# Patient Record
Sex: FEMALE | Race: WHITE | NOT HISPANIC OR LATINO | Employment: OTHER | URBAN - METROPOLITAN AREA
[De-identification: names, ages, dates, MRNs, and addresses within clinical notes are randomized per-mention and may not be internally consistent; named-entity substitution may affect disease eponyms.]

---

## 2017-01-02 PROBLEM — I10 HYPERTENSION GOAL BP (BLOOD PRESSURE) < 150/90: Status: ACTIVE | Noted: 2017-01-02

## 2017-01-06 ENCOUNTER — RADIANT APPOINTMENT (OUTPATIENT)
Dept: CARDIOLOGY | Facility: CLINIC | Age: 82
End: 2017-01-06
Attending: INTERNAL MEDICINE
Payer: COMMERCIAL

## 2017-01-06 ENCOUNTER — RADIANT APPOINTMENT (OUTPATIENT)
Dept: BONE DENSITY | Facility: CLINIC | Age: 82
End: 2017-01-06
Attending: INTERNAL MEDICINE
Payer: COMMERCIAL

## 2017-01-06 DIAGNOSIS — R01.1 HEART MURMUR: ICD-10-CM

## 2017-01-06 DIAGNOSIS — Z78.0 ASYMPTOMATIC POSTMENOPAUSAL STATUS: ICD-10-CM

## 2017-01-06 PROCEDURE — 77080 DXA BONE DENSITY AXIAL: CPT | Performed by: INTERNAL MEDICINE

## 2017-01-06 PROCEDURE — 93306 TTE W/DOPPLER COMPLETE: CPT | Performed by: INTERNAL MEDICINE

## 2017-01-06 NOTE — Clinical Note
"[address]  [address]  [phone]                                   2017    Ingrid Culver  [address] [place] [phone]        Dear Ingrid,    Your bone density shows borderline osteoporosis. This finding along with other risk factors (age, weight, family history) implies that medical treatment IS indicated.      A bone binder (\"bisphosphonate\") to strengthen bones and reduce fracture risk such as Fosamax is recommended.  This would be a prescription and in addition to daily vitamin D (2000 IU total), exercise, calcium.     If you would like to discuss this added therapy, please make an appointment.     Results for orders placed or performed in visit on 17   DEXA HIP/PELVIS/SPINE - Future    Narrative    BONE DENSITOMETRY  [address] [address]  2017     PATIENT: Ingrid Culver  CHART: [id number]    :  1932  AGE:  84 year old  SEX:  female   REFERRING PROVIDER:  Radha Sheets MD    PROCEDURE:  Bone density scanning was performed using DXA technology of   the lumbar spine and hip.  Scanning was performed on a Lunar Prodigy   scanner.  Reporting is completed in the form of a T-score.  The T-score   represents the standard deviation from peak bone mass based on a young   healthy adult.    REFERENCE T-SCORES:       Normal                -1.0 and greater                                  Osteopenia         Between -1.0 and   -2.5                                            Osteoporosis     -2.5 and less                                         RISK FACTORS:  Post-menopausal    CURRENT TREATMENT:  Vitamin D    FINDINGS:               Lumbar Spine (L1-L4)      T-score:  -1.0, degenerative   changes present               Left Femoral Neck            T-score:  -2.1               Right Femoral Neck         T-score:  -2.3                            Lumbar " "(L1-L4) BMD: 1.062                     Total Hip Mean BMD: 0.752    IMPRESSION  Osteopenia., Degenerative changes of the lumbar spine which may falsely   elevate results.    Patient had a study performed previously, however the scans are not   available to compare to the current study.     Recommendations include ensuring adequate Calcium and Vitamin D.    The current NOF Guidelines recommend treatment for patients with prior hip   or vertebral fracture, T-score -2.5 or below, or 10 year risk of any major   osteoporotic fracture >20% or 10 year risk of hip fracture >3%, as   calculated using the FRAX calculator (www.shef.ac.uk/FRAX or you can   google \"FRAX\").      This patient's risks based on available information, with the use of FRAX,   are 17 % for major osteoporotic fracture and 5.9 % for hip fracture.   Based on these guidelines, treatment (in addition to calcium and vitamin   D) is recommended for this patient, after ruling out other causes of   osteoporosis.  This is meant as an aid to clinical decision making; one must still use   clinical judgement.      Follow up can be considered in 2 years.   ___________________  Christin Keenan M.D.  Electronically signed            If you have any questions please call the clinic at [phone]    Sincerely,    JOSE ANGEL MUSA M.D.  bmd    "

## 2017-01-06 NOTE — Clinical Note
"[facility]   [address] [place]  [address]  [phone]                                   2017    Ryan Culver  [address] [place] [phone]        Dear Ryan,    Your echocardiogram report is enclosed.  There is no source of the murmur I heard, so it was likely an \"innocent flow murmur\".     There is some \"thickening\" of the heart muscle on one area of the heart.  This is not likely to be compromising function.  The thickening CAN be a sign of uncontrolled blood pressure.  Over time, if the heart muscle becomes too thick, one can become more prone to heart failure symptoms.     At our recent appointment, we did discuss your blood pressure.  You had felt it was normal when you checked it at home    I would like you to check the blood pressure daily.  The goal is < 140/90.  If your numbers are often above this, please make an appointment to work on getting this better controlled.     Results for orders placed or performed in visit on 17   Echocardiogram Complete    Narrative    Interpretation Summary                    [facility], Echocardiography Laboratory  [address] [place] [address]  [place] [address]        Name: RYAN CULVER  MRN: [id number]  : 1932  Study Date: 2017 10:02 AM  Age: 84 yrs  Gender: Female  Patient Location: [facility]  Reason For Study: Cardiac murmur, unspecified  Ordering Physician: JOSE ANGEL MUSA  Referring Physician: JOSE ANGEL MUSA  Performed By: Majo Jeter RDCS     BSA: 1.5 m2  Height: 60 in  Weight: 118 lb  HR: 86  BP: 158/80 mmHg  ______________________________________________________________________________        Procedure  Echocardiogram with two-dimensional, color and spectral Doppler performed.  ______________________________________________________________________________     Interpretation Summary     Trace to mild mitral insufficiency is " present.  ______________________________________________________________________________           Left Ventricle  Global and regional left ventricular function is normal with an EF of 60-65%.  Left ventricular wall thickness is normal. Left ventricular size is normal.  Thickening of the anterobasal septum is present. Left ventricular diastolic  function is indeterminate. No regional wall motion abnormalities are seen.     Right Ventricle  Right ventricular function, chamber size, wall motion, and thickness are  normal.  Atria  Both atria appear normal.     Mitral Valve  Mild mitral annular calcification is present. Trace to mild mitral  insufficiency is present.     Aortic Valve  The aortic valve is tricuspid. Mild aortic valve sclerosis is present.     Tricuspid Valve  The tricuspid valve is normal. Trace tricuspid insufficiency is present. The  right ventricular systolic pressure is approximated at 25.5 mmHg plus the  right atrial pressure.     Pulmonic Valve  The pulmonic valve is normal. Trace pulmonic insufficiency is present.     Vessels  The aorta root is normal. The pulmonary artery is normal. The inferior vena  cava is normal.  Pericardium  No pericardial effusion is present.     ______________________________________________________________________________  MMode/2D Measurements & Calculations  IVSd: 1.3 cm  LVIDd: 3.7 cm  LVIDs: 2.2 cm  LVPWd: 0.64 cm  FS: 41.6 %  EDV(Teich): 59.6 ml  ESV(Teich): 15.9 ml  LV mass(C)d: 106.1 grams  Ao root diam: 3.3 cm  LA dimension: 3.3 cm  asc Aorta Diam: 2.9 cm  LA/Ao: 1.0  LVOT diam: 2.0 cm  LVOT area: 3.1 cm2  LA Volume (BP): 39.0 ml     LA Volume Index (BP): 26.2 ml/m2           Doppler Measurements & Calculations  MV E max nisha: 86.0 cm/sec  MV A max nisha: 137.5 cm/sec  MV E/A: 0.63  MV dec time: 0.22 sec  Ao V2 max: 203.0 cm/sec  Ao max P.5 mmHg  Ao V2 mean: 132.0 cm/sec  Ao mean P.0 mmHg  Ao V2 VTI: 40.1 cm  LOUIE(I,D): 1.7 cm2  LOUIE(V,D): 1.6 cm2  LV V1 max  P.3 mmHg  LV V1 max: 104.0 cm/sec  LV V1 VTI: 22.2 cm  CO(LVOT): 4.4 l/min  CI(LVOT): 2.9 l/min/m2  SV(LVOT): 69.7 ml  PA V2 max: 71.7 cm/sec  PA max P.1 mmHg  PA acc time: 0.10 sec  PI end-d sarath: 91.7 cm/sec  PI max sarath: 147.2 cm/sec  PI max P.7 mmHg  TR max sarath: 252.5 cm/sec  TR max P.5 mmHg  Pulm Sys Sarath: 63.2 cm/sec  Pulm Giron Sarath: 33.3 cm/sec  Pulm S/D: 1.9  LOUIE Index (cm2/m2): 1.2  Lateral E/e': 23.4  Medial E/e': 22.6           ______________________________________________________________________________        Report approved by: Rama Dinh 2017 10:49 AM          If you have any questions please call the clinic at [phone]    Sincerely,    JOSE ANGEL MUSA M.D.  bmd

## 2017-01-08 ENCOUNTER — TELEPHONE (OUTPATIENT)
Dept: INTERNAL MEDICINE | Facility: CLINIC | Age: 82
End: 2017-01-08

## 2017-01-08 NOTE — PROGRESS NOTES
"Quick Note:    Ingrid Culver    Your bone density shows borderline osteoporosis. This finding along with other risk factors (age, weight, family history) implies that medical treatment IS indicated.     A bone binder (\"bisphosphonate\") to strengthen bones and reduce fracture risk such as Fosamax is recommended. This would be a prescription and in addition to daily vitamin D (2000 IU total), exercise, calcium.     If you would like to discuss this added therapy, please make an appointment.     Sincerely,     JOSE ANGEL MUSA M.D.  ______  "

## 2017-01-08 NOTE — PROGRESS NOTES
"Quick Note:    Ingrid Culver    Your echocardiogram report is enclosed. There is no source of the murmur I heard, so it was likely an \"innocent flow murmur\".     There is some \"thickening\" of the heart muscle on one area of the heart. This is not likely to be compromising function. The thickening CAN be a sign of uncontrolled blood pressure. Over time, if the heart muscle becomes too thick, one can become more prone to heart failure symptoms.     At our recent appointment, we did discuss your blood pressure. You had felt it was normal when you checked it at home    I would like you to check the blood pressure daily. The goal is < 140/90. If your numbers are often above this, please make an appointment to work on getting this better controlled.     Sincerely,     JOSE ANGEL MUSA M.D.  ______  "

## 2017-01-08 NOTE — TELEPHONE ENCOUNTER
"Letter sent to patient:   ______________    Ingrid Culver    Your echocardiogram report is enclosed.  There is no source of the murmur I heard, so it was likely an \"innocent flow murmur\".     There is some \"thickening\" of the heart muscle on one area of the heart.  This is not likely to be compromising function.  The thickening CAN be a sign of uncontrolled blood pressure.  Over time, if the heart muscle becomes too thick, one can become more prone to heart failure symptoms.     At our recent appointment, we did discuss your blood pressure.  You had felt it was normal when you checked it at home    I would like you to check the blood pressure daily.  The goal is < 140/90.  If your numbers are often above this, please make an appointment to work on getting this better controlled.     Sincerely,     JOSE ANGEL MUSA M.D.   "

## 2017-01-12 ENCOUNTER — TELEPHONE (OUTPATIENT)
Dept: INTERNAL MEDICINE | Facility: CLINIC | Age: 82
End: 2017-01-12

## 2017-01-12 NOTE — TELEPHONE ENCOUNTER
I called patient and advised her of results; she states her BP is usually under 140/90; usually is 1298-130 over 72-80.    She does check it most days, advised her to keep an eye on it and call to be seen if it seems to be over 140/90 more often.    She states she used to be on fosamax but it made her so constipated, she is eating prunes and all bran, uses miralax and metamucil and has history of being hospitalized for blockage/constipation.  She is on 1000 units Vit D daily, not on calcium supplement but states she drinks a lot of milk and eats leafy greens.    Routed to [facility] to advise on plan with update from patient.  Olga Wooten RN  [facility]

## 2017-01-12 NOTE — TELEPHONE ENCOUNTER
Patient says she got her letters in the mail today.  She will monitor her BP and if having problems with it being over goal too much she will schedule to be seen for that and to discuss the osteoporosis treatment.  If BP okay, will consider coming in to discuss reclast, she is worried about side effects of that.  Olga Wooten RN  [facility]

## 2017-01-12 NOTE — TELEPHONE ENCOUNTER
We could order this as and IV infusion once yearly (Reclast).  That would bypass GI tract and she would still get treatment.  Would do at [facility] .  Would she be interested in getting scheduled for this infusion?

## 2017-01-12 NOTE — TELEPHONE ENCOUNTER
"See copy of result letter mailed to patient regarding Dexa:    Notes Recorded by Radha Sheets MD on 1/8/2017 at 3:03 PM  Ingrid Culver    Your bone density shows borderline osteoporosis. This finding along with other risk factors (age, weight, family history) implies that medical treatment IS indicated.      A bone binder (\"bisphosphonate\") to strengthen bones and reduce fracture risk such as Fosamax is recommended.  This would be a prescription and in addition to daily vitamin D (2000 IU total), exercise, calcium.     If you would like to discuss this added therapy, please make an appointment.     Sincerely,     RADHA SHEETS M.D.        See ECHO result letter mailed:    Echocardiogram Complete   Status: Final result     Visible to patient:  Not Released     Next appt: None     Dx:  Heart murmur               Notes Recorded by Juliann Jeong on 1/9/2017 at 8:17 AM  Lab letter sent ROMA Stephens  Notes Recorded by Radha Sheets MD on 1/8/2017 at 3:37 PM  Ingrid Culver    Your echocardiogram report is enclosed.  There is no source of the murmur I heard, so it was likely an \"innocent flow murmur\".     There is some \"thickening\" of the heart muscle on one area of the heart.  This is not likely to be compromising function.  The thickening CAN be a sign of uncontrolled blood pressure.  Over time, if the heart muscle becomes too thick, one can become more prone to heart failure symptoms.     At our recent appointment, we did discuss your blood pressure.  You had felt it was normal when you checked it at home    I would like you to check the blood pressure daily.  The goal is < 140/90.  If your numbers are often above this, please make an appointment to work on getting this better controlled.     Sincerely,     RADHA SHEETS M.D.     Details                    I called patient and advised her of results; she states her BP is usually under 140/90; usually is 1298-130 over 72-80.    She " does check it most days, advised her to keep an eye on it and call to be seen if it seems to be over 140/90 more often.    She states she used to be on fosamax but it made her so constipated, she is eating prunes and all bran, uses miralax and metamucil and has history of being hospitalized for blockage/constipation.  She is on 1000 units Vit D daily, not on calcium supplement but states she drinks a lot of milk and eats leafy greens.    Routed to [facility] to advise on plan with update from patient.  Olga Wooten RN  [facility]

## 2017-01-12 NOTE — TELEPHONE ENCOUNTER
Reason for Call:  Request for results:    Name of test or procedure: Bone Density & ECHO     Date of test of procedure: 01/06/17    Location of the test or procedure: [facility] to leave the result message on voice mail or with a family member? Not Applicable    Phone number Patient can be reached at:  Home number on file [phone] (home)    Additional comments: Call taken on 1/12/2017 at 1:16 PM by Maribell Hernandez

## 2017-01-16 DIAGNOSIS — E03.4 HYPOTHYROIDISM DUE TO ACQUIRED ATROPHY OF THYROID: Primary | ICD-10-CM

## 2017-01-16 NOTE — TELEPHONE ENCOUNTER
levothyroxine (SYNTHROID) 25 MCG tablet     Last Written Prescription Date: 12/29/15  Last Quantity: 90, # refills: 3  Last Office Visit with G, [facility] or [facility] prescribing provider: 12/19/16        TSH   Date Value Ref Range Status   12/19/2016 2.26 0.40 - 4.00 mU/L Final

## 2017-01-17 PROBLEM — E03.4 HYPOTHYROIDISM DUE TO ACQUIRED ATROPHY OF THYROID: Status: ACTIVE | Noted: 2017-01-17

## 2017-01-17 RX ORDER — LEVOTHYROXINE SODIUM 25 UG/1
25 TABLET ORAL DAILY
Qty: 90 TABLET | Refills: 3 | Status: SHIPPED | OUTPATIENT
Start: 2017-01-17 | End: 2018-01-01

## 2017-01-17 NOTE — TELEPHONE ENCOUNTER
Routing refill request to provider for review/approval because:  Associated diagnosis not on problem list  Olga Wooten RN  [facility]

## 2017-02-02 ENCOUNTER — MEDICAL CORRESPONDENCE (OUTPATIENT)
Dept: HEALTH INFORMATION MANAGEMENT | Facility: CLINIC | Age: 82
End: 2017-02-02

## 2017-02-10 ENCOUNTER — OFFICE VISIT (OUTPATIENT)
Dept: INTERNAL MEDICINE | Facility: CLINIC | Age: 82
End: 2017-02-10
Payer: COMMERCIAL

## 2017-02-10 VITALS
DIASTOLIC BLOOD PRESSURE: 70 MMHG | SYSTOLIC BLOOD PRESSURE: 125 MMHG | TEMPERATURE: 97.9 F | HEIGHT: 60 IN | WEIGHT: 122 LBS | BODY MASS INDEX: 23.95 KG/M2 | OXYGEN SATURATION: 94 % | HEART RATE: 78 BPM

## 2017-02-10 DIAGNOSIS — I10 HYPERTENSION GOAL BP (BLOOD PRESSURE) < 150/90: Primary | ICD-10-CM

## 2017-02-10 DIAGNOSIS — E03.4 HYPOTHYROIDISM DUE TO ACQUIRED ATROPHY OF THYROID: ICD-10-CM

## 2017-02-10 DIAGNOSIS — E78.5 HYPERLIPIDEMIA LDL GOAL <130: ICD-10-CM

## 2017-02-10 PROCEDURE — 99213 OFFICE O/P EST LOW 20 MIN: CPT | Performed by: INTERNAL MEDICINE

## 2017-02-10 RX ORDER — FAMOTIDINE 20 MG
TABLET ORAL 2 TIMES DAILY
COMMUNITY
End: 2021-12-08

## 2017-02-10 RX ORDER — MULTIVITAMIN WITH IRON
1 TABLET ORAL DAILY
COMMUNITY
End: 2022-05-13

## 2017-02-10 NOTE — MR AVS SNAPSHOT
After Visit Summary   2/10/2017    Ingrid Culver    MRN: [id number]           Patient Information     Date Of Birth          9/20/1932        Visit Information        Provider Department      2/10/2017 2:20 PM Radha Sheets MD [facility]        Today's Diagnoses     Hypertension goal BP (blood pressure) < 150/90    -  1     Hypothyroidism due to acquired atrophy of thyroid         Hyperlipidemia LDL goal <130           Care Instructions    Okay to use zantac 75 mg as needed for GERD symptoms     Return to clinic December 2017 for your annual visit.          Follow-ups after your visit        Future tests that were ordered for you today     Open Future Orders        Priority Expected Expires Ordered    Basic metabolic panel Routine 12/10/2017 2/10/2018 2/10/2017    TSH with free T4 reflex Routine 12/10/2017 2/10/2018 2/10/2017    Lipid panel reflex to direct LDL Routine 12/10/2017 2/10/2018 2/10/2017            Who to contact     If you have questions or need follow up information about today's clinic visit or your schedule please contact [facility] directly at [phone].  Normal or non-critical lab and imaging results will be communicated to you by [employer], letter or phone within 4 business days after the clinic has received the results. If you do not hear from us within 7 days, please contact the clinic through [employer] or phone. If you have a critical or abnormal lab result, we will notify you by phone as soon as possible.  Submit refill requests through [employer] or call your pharmacy and they will forward the refill request to us. Please allow 3 business days for your refill to be completed.          Additional Information About Your Visit        MyChart Information     [employer] lets you send messages to your doctor, view your test results, renew your prescriptions, schedule appointments and more. To sign up, go to [email or web address] .  "Click on \"Log in\" on the left side of the screen, which will take you to the Welcome page. Then click on \"Sign up Now\" on the right side of the page.     You will be asked to enter the access code listed below, as well as some personal information. Please follow the directions to create your username and password.     Your access code is: [email or web address]  Expires: 3/19/2017 10:31 AM     Your access code will  in 90 days. If you need help or a new code, please call your [facility] clinic or [phone].        Care EveryWhere ID     This is your Care EveryWhere ID. This could be used by other organizations to access your [facility] medical records  [id number]        Your Vitals Were     Pulse Temperature Height BMI (Body Mass Index) Pulse Oximetry       78 97.9  F (36.6  C) (Oral) 5' (1.524 m) 23.83 kg/m2 94%        Blood Pressure from Last 3 Encounters:   02/10/17 125/70   16 155/77   06/10/16 117/66    Weight from Last 3 Encounters:   02/10/17 122 lb (55.339 kg)   16 118 lb (53.524 kg)   16 120 lb (54.432 kg)               Primary Care Provider Office Phone # Fax #    Radha Sheets [phone]       [address] [address]        Thank you!     Thank you for choosing [facility]  for your care. Our goal is always to provide you with excellent care. Hearing back from our patients is one way we can continue to improve our services. Please take a few minutes to complete the written survey that you may receive in the mail after your visit with us. Thank you!             Your Updated Medication List - Protect others around you: Learn how to safely use, store and throw away your medicines at www.disposemymeds.org.          This list is accurate as of: 2/10/17  2:48 PM.  Always use your most recent med list.                   Brand Name Dispense Instructions for use    azithromycin 250 MG tablet    ZITHROMAX    "  1 tablet every M,W,F       COLACE PO          EPINEPHrine 0.3 MG/0.3ML injection    EPIPEN    2 each    Inject 0.3 mLs (0.3 mg) into the muscle once as needed for anaphylaxis       levothyroxine 25 MCG tablet    SYNTHROID    90 tablet    Take 1 tablet (25 mcg) by mouth daily       magnesium 250 MG tablet      Take 1 tablet by mouth daily       polyethylene glycol Packet    MIRALAX/GLYCOLAX     Take 1 packet by mouth daily       pravastatin 10 MG tablet    PRAVACHOL    90 tablet    Take 1 tablet (10 mg) by mouth daily       psyllium Wafr      Take 2 Wafers by mouth daily       TYLENOL ARTHRITIS EXT RELIEF OR      as needed       Vitamin D (Cholecalciferol) 1000 UNITS Caps      Take by mouth 2 times daily

## 2017-02-10 NOTE — PATIENT INSTRUCTIONS
Okay to use zantac 75 mg as needed for GERD symptoms     Return to clinic December 2017 for your annual visit.

## 2017-02-10 NOTE — PROGRESS NOTES
SUBJECTIVE:                                                    Ingrid Culver is a 84 year old female who presents to clinic today for the following health issues:      Results of Echo and Dexa .      Hypertension Follow-up      Outpatient blood pressures are being checked at home.  Results are has a log sheet with .    Low Salt Diet: low salt     Has a graph of her BP :  Generally 118 - 128 / 50 - 70.     Feeling well.     Complains of some indigestion.   Able to manage with prn natural remedies.      Problem list and histories reviewed & adjusted, as indicated.  Additional history: as documented    Patient Active Problem List   Diagnosis     SHOULDER IMPINGEMENT - left     Hyperlipidemia LDL goal <130     Advanced directives, counseling/discussion     Insomnia     Trigger finger, acquired - right ring     Trigger thumb - right     Bronchiectasis (H)     Cerebral infarction (H)     Vitreous syneresis     PVD (posterior vitreous detachment)     Pseudophakia OU     Esotropia chronic c prism     Dry eyes, bilateral     Other idiopathic scoliosis, thoracolumbar region     Ganglion cyst of finger of right hand     Hypertension goal BP (blood pressure) < 150/90     Hypothyroidism due to acquired atrophy of thyroid     Past Surgical History   Procedure Laterality Date     Flexible sigmoidoscopy  06/95,08/98     Colonoscopy  06/00   And 2008     diverticulosis     Appendectomy       Breast biopsy, rt/lt       Breat Biopsy RT/LT     Joint replacemtn, knee rt/lt  92     right     C total knee arthroplasty  06/25/03     left poly exchange     Extracapsular cataract extration with intraocular lens implant  01/2000     both eyes     Colonoscopy  1-13-12     Endoscopy  1-13-12       Social History   Substance Use Topics     Smoking status: Former Smoker     Types: Cigarettes     Quit date: 11/9/1974     Smokeless tobacco: Never Used     Alcohol use Yes      Comment: occasional glass of wine     Family History   Problem  Relation Age of Onset     Breast Cancer Mother      Arthritis Mother      Thyroid Disease Mother      Cardiovascular Father      Asthma Maternal Grandmother      CANCER Brother      Glaucoma No family hx of      Macular Degeneration No family hx of      CANCER Son      tonsil and tongue, bladder     Alcohol/Drug Son      HEART DISEASE Daughter          Current Outpatient Prescriptions   Medication Sig Dispense Refill     magnesium 250 MG tablet Take 1 tablet by mouth daily       Vitamin D, Cholecalciferol, 1000 UNITS CAPS Take by mouth 2 times daily       levothyroxine (SYNTHROID) 25 MCG tablet Take 1 tablet (25 mcg) by mouth daily 90 tablet 3     pravastatin (PRAVACHOL) 10 MG tablet Take 1 tablet (10 mg) by mouth daily 90 tablet 3     polyethylene glycol (MIRALAX/GLYCOLAX) Packet Take 1 packet by mouth daily       Docusate Sodium (COLACE PO)        psyllium (METAMUCIL) WAFR Take 2 Wafers by mouth daily       azithromycin (ZITHROMAX) 250 MG tablet 1 tablet every M,W,F       EPINEPHrine (EPIPEN) 0.3 MG/0.3ML injection Inject 0.3 mLs (0.3 mg) into the muscle once as needed for anaphylaxis 2 each 3     TYLENOL ARTHRITIS EXT RELIEF OR as needed       Allergies   Allergen Reactions     Zocor [Hmg-Coa-R Inhibitors]      Recent Labs   Lab Test  12/19/16   1037 05/31/16 04/11/16  12/16/15   1051  12/11/14   0945   11/30/12   0742   LDL  86   --    --   91  70   < >  99   HDL  68   --    --   63  55   < >  49*   TRIG  152*   --    --   156*  138   < >  121   ALT   --    --    --   32  21   --   25   CR  0.71  0.75  0.77  0.83  0.78   < >  0.80   GFRESTIMATED  79   --    --   65  70   < >  69   GFRESTBLACK  >90   GFR Calc     --    --   79  85   < >  84   POTASSIUM  4.2   --   4.2  4.9  4.0   < >  4.2   TSH  2.26   --    --   2.04  1.63   < >  2.70    < > = values in this interval not displayed.      BP Readings from Last 3 Encounters:   02/10/17 125/70   12/19/16 155/77   06/10/16 117/66    Wt Readings from  Last 3 Encounters:   02/10/17 122 lb (55.3 kg)   16 118 lb (53.5 kg)   16 120 lb (54.4 kg)                  Labs reviewed in EPIC  Problem list, Medication list, Allergies, and Medical/Social/Surgical histories reviewed in [facility] and updated as appropriate.    ROS:  Constitutional, HEENT, cardiovascular, pulmonary, gi and gu systems are negative, except as otherwise noted.    OBJECTIVE:                                                    /70  Pulse 78  Temp 97.9  F (36.6  C) (Oral)  Ht 5' (1.524 m)  Wt 122 lb (55.3 kg)  SpO2 94%  BMI 23.83 kg/m2  Body mass index is 23.83 kg/(m^2).  GENERAL: healthy, alert and no distress  EYES: Eyes grossly normal to inspection, PERRL and conjunctivae and sclerae normal  NECK: no adenopathy, no asymmetry, masses, or scars and thyroid normal to palpation  RESP: lungs clear to auscultation - no rales, rhonchi or wheezes  CV: regular rate and rhythm, normal S1 S2, no S3 or S4, no murmur, click or rub, no peripheral edema and peripheral pulses strong  ABDOMEN: soft, nontender, no hepatosplenomegaly, no masses and bowel sounds normal  MS: no gross musculoskeletal defects noted, no edema    Diagnostic Test Results:  Results for orders placed or performed in visit on 17   DEXA HIP/PELVIS/SPINE - Future    Narrative    BONE DENSITOMETRY  [address] [address]  2017     PATIENT: Ingrid Culver  CHART: [id number]    :  1932  AGE:  84 year old  SEX:  female   REFERRING PROVIDER:  Radha Sheets MD    PROCEDURE:  Bone density scanning was performed using DXA technology of   the lumbar spine and hip.  Scanning was performed on a Lunar Pro[employer]   scanner.  Reporting is completed in the form of a T-score.  The T-score   represents the standard deviation from peak bone mass based on a young   healthy adult.    REFERENCE T-SCORES:       Normal                -1.0 and greater                                   "Osteopenia         Between -1.0 and   -2.5                                            Osteoporosis     -2.5 and less                                         RISK FACTORS:  Post-menopausal    CURRENT TREATMENT:  Vitamin D    FINDINGS:               Lumbar Spine (L1-L4)      T-score:  -1.0, degenerative   changes present               Left Femoral Neck            T-score:  -2.1               Right Femoral Neck         T-score:  -2.3                            Lumbar (L1-L4) BMD: 1.062                     Total Hip Mean BMD: 0.752    IMPRESSION  Osteopenia., Degenerative changes of the lumbar spine which may falsely   elevate results.    Patient had a study performed previously, however the scans are not   available to compare to the current study.     Recommendations include ensuring adequate Calcium and Vitamin D.    The current NOF Guidelines recommend treatment for patients with prior hip   or vertebral fracture, T-score -2.5 or below, or 10 year risk of any major   osteoporotic fracture >20% or 10 year risk of hip fracture >3%, as   calculated using the FRAX calculator (www.shef.ac.uk/FRAX or you can   google \"FRAX\").      This patient's risks based on available information, with the use of FRAX,   are 17 % for major osteoporotic fracture and 5.9 % for hip fracture.   Based on these guidelines, treatment (in addition to calcium and vitamin   D) is recommended for this patient, after ruling out other causes of   osteoporosis.  This is meant as an aid to clinical decision making; one must still use   clinical judgement.      Follow up can be considered in 2 years.   ___________________  Christin Keenan M.D.  Electronically signed             ASSESSMENT/PLAN:                                                               ICD-10-CM    1. Hypertension goal BP (blood pressure) < 150/90 I10 Basic metabolic panel   2. Hypothyroidism due to acquired atrophy of thyroid E03.4 TSH with free T4 reflex   3. Hyperlipidemia LDL goal " <130 E78.5 Lipid panel reflex to direct LDL     She has osteopenia and we discussed her high FRAX score.  She declines fosamax. Discussed at length.    Reviewed recent echo with her:  The murmur is innocent.     See Patient Instructions    Radha Sheets MD  [facility]

## 2017-02-10 NOTE — NURSING NOTE
Chief Complaint   Patient presents with     Results     Echo and Dexa     Hypertension     Health Maintenance     eye        Initial /70 mmHg  Pulse 78  Temp(Src) 97.9  F (36.6  C) (Oral)  Ht 5' (1.524 m)  Wt 122 lb (55.339 kg)  BMI 23.83 kg/m2  SpO2 94% Estimated body mass index is 23.83 kg/(m^2) as calculated from the following:    Height as of this encounter: 5' (1.524 m).    Weight as of this encounter: 122 lb (55.339 kg).  Medication Reconciliation: complete   Ania Lopez ma

## 2017-06-22 ENCOUNTER — OFFICE VISIT (OUTPATIENT)
Dept: FAMILY MEDICINE | Facility: CLINIC | Age: 82
End: 2017-06-22
Payer: COMMERCIAL

## 2017-06-22 ENCOUNTER — RADIANT APPOINTMENT (OUTPATIENT)
Dept: GENERAL RADIOLOGY | Facility: CLINIC | Age: 82
End: 2017-06-22
Attending: INTERNAL MEDICINE
Payer: COMMERCIAL

## 2017-06-22 VITALS
HEART RATE: 73 BPM | WEIGHT: 124 LBS | BODY MASS INDEX: 24.22 KG/M2 | TEMPERATURE: 97.2 F | SYSTOLIC BLOOD PRESSURE: 162 MMHG | DIASTOLIC BLOOD PRESSURE: 82 MMHG | OXYGEN SATURATION: 96 %

## 2017-06-22 DIAGNOSIS — R10.84 ABDOMINAL PAIN, GENERALIZED: ICD-10-CM

## 2017-06-22 DIAGNOSIS — J47.9 BRONCHIECTASIS WITHOUT COMPLICATION (H): Primary | ICD-10-CM

## 2017-06-22 DIAGNOSIS — R07.89 ATYPICAL CHEST PAIN: ICD-10-CM

## 2017-06-22 DIAGNOSIS — M41.35 THORACOGENIC SCOLIOSIS OF THORACOLUMBAR REGION: ICD-10-CM

## 2017-06-22 DIAGNOSIS — M54.50 LEFT-SIDED LOW BACK PAIN WITHOUT SCIATICA, UNSPECIFIED CHRONICITY: ICD-10-CM

## 2017-06-22 DIAGNOSIS — I10 HYPERTENSION GOAL BP (BLOOD PRESSURE) < 150/90: ICD-10-CM

## 2017-06-22 DIAGNOSIS — E03.4 HYPOTHYROIDISM DUE TO ACQUIRED ATROPHY OF THYROID: ICD-10-CM

## 2017-06-22 LAB
ALBUMIN SERPL-MCNC: 3.6 G/DL (ref 3.4–5)
ALBUMIN UR-MCNC: NEGATIVE MG/DL
ALP SERPL-CCNC: 55 U/L (ref 40–150)
ALT SERPL W P-5'-P-CCNC: 22 U/L (ref 0–50)
APPEARANCE UR: CLEAR
AST SERPL W P-5'-P-CCNC: 20 U/L (ref 0–45)
BACTERIA #/AREA URNS HPF: ABNORMAL /HPF
BASOPHILS # BLD AUTO: 0.1 10E9/L (ref 0–0.2)
BASOPHILS NFR BLD AUTO: 1.5 %
BILIRUB DIRECT SERPL-MCNC: <0.1 MG/DL (ref 0–0.2)
BILIRUB SERPL-MCNC: 0.3 MG/DL (ref 0.2–1.3)
BILIRUB UR QL STRIP: NEGATIVE
COLOR UR AUTO: YELLOW
DIFFERENTIAL METHOD BLD: NORMAL
EOSINOPHIL # BLD AUTO: 0.3 10E9/L (ref 0–0.7)
EOSINOPHIL NFR BLD AUTO: 4.4 %
ERYTHROCYTE [DISTWIDTH] IN BLOOD BY AUTOMATED COUNT: 13.9 % (ref 10–15)
GLUCOSE UR STRIP-MCNC: NEGATIVE MG/DL
HCT VFR BLD AUTO: 40.7 % (ref 35–47)
HGB BLD-MCNC: 13.5 G/DL (ref 11.7–15.7)
HGB UR QL STRIP: ABNORMAL
KETONES UR STRIP-MCNC: NEGATIVE MG/DL
LEUKOCYTE ESTERASE UR QL STRIP: ABNORMAL
LIPASE SERPL-CCNC: 99 U/L (ref 73–393)
LYMPHOCYTES # BLD AUTO: 1.9 10E9/L (ref 0.8–5.3)
LYMPHOCYTES NFR BLD AUTO: 31.4 %
MCH RBC QN AUTO: 30.3 PG (ref 26.5–33)
MCHC RBC AUTO-ENTMCNC: 33.2 G/DL (ref 31.5–36.5)
MCV RBC AUTO: 91 FL (ref 78–100)
MONOCYTES # BLD AUTO: 0.8 10E9/L (ref 0–1.3)
MONOCYTES NFR BLD AUTO: 12.3 %
NEUTROPHILS # BLD AUTO: 3.1 10E9/L (ref 1.6–8.3)
NEUTROPHILS NFR BLD AUTO: 50.4 %
NITRATE UR QL: NEGATIVE
NON-SQ EPI CELLS #/AREA URNS LPF: ABNORMAL /LPF
PH UR STRIP: 7 PH (ref 5–7)
PLATELET # BLD AUTO: 315 10E9/L (ref 150–450)
PROT SERPL-MCNC: 7.6 G/DL (ref 6.8–8.8)
RBC # BLD AUTO: 4.46 10E12/L (ref 3.8–5.2)
RBC #/AREA URNS AUTO: ABNORMAL /HPF (ref 0–2)
SP GR UR STRIP: <=1.005 (ref 1–1.03)
URN SPEC COLLECT METH UR: ABNORMAL
UROBILINOGEN UR STRIP-ACNC: 0.2 EU/DL (ref 0.2–1)
WBC # BLD AUTO: 6.1 10E9/L (ref 4–11)
WBC #/AREA URNS AUTO: ABNORMAL /HPF (ref 0–2)

## 2017-06-22 PROCEDURE — 81001 URINALYSIS AUTO W/SCOPE: CPT | Performed by: INTERNAL MEDICINE

## 2017-06-22 PROCEDURE — 36415 COLL VENOUS BLD VENIPUNCTURE: CPT | Performed by: INTERNAL MEDICINE

## 2017-06-22 PROCEDURE — 74020 XR ABDOMEN 2 VW: CPT

## 2017-06-22 PROCEDURE — 83690 ASSAY OF LIPASE: CPT | Performed by: INTERNAL MEDICINE

## 2017-06-22 PROCEDURE — 85025 COMPLETE CBC W/AUTO DIFF WBC: CPT | Performed by: INTERNAL MEDICINE

## 2017-06-22 PROCEDURE — 93000 ELECTROCARDIOGRAM COMPLETE: CPT | Performed by: INTERNAL MEDICINE

## 2017-06-22 PROCEDURE — 80076 HEPATIC FUNCTION PANEL: CPT | Performed by: INTERNAL MEDICINE

## 2017-06-22 PROCEDURE — 99215 OFFICE O/P EST HI 40 MIN: CPT | Performed by: INTERNAL MEDICINE

## 2017-06-22 NOTE — PROGRESS NOTES
Ingrid Culver    Enclosed are your results.  Your labs are normal/stable at this time.   The urine I think is a bit contaminated, but no infection.     Please call  with any questions.  We can also discuss any questions regarding these labs at your next scheduled visit.    Sincerely,    Radha Sheets M.D.

## 2017-06-22 NOTE — MR AVS SNAPSHOT
After Visit Summary   6/22/2017    Ingrid Culver    MRN: [id number]           Patient Information     Date Of Birth          9/20/1932        Visit Information        Provider Department      6/22/2017 6:40 AM Radha Sheets MD [facility]        Today's Diagnoses     Bronchiectasis without complication (H)    -  1    Hypertension goal BP (blood pressure) < 150/90        Hypothyroidism due to acquired atrophy of thyroid        Atypical chest pain        Left-sided low back pain without sciatica, unspecified chronicity        Abdominal pain, generalized        Thoracogenic scoliosis of thoracolumbar region          Care Instructions    [facility] [phone]   [email or web address]     Barium enema colon contrast study    Return to clinic for annual physical December 2017 (or sooner if needed)              Follow-ups after your visit        Additional Services     PHYSIATRY REFERRAL       Your provider has referred you to: [facility]: [facility] Medicine [facility] Rehabilitation [facility] [phone]   [email or web address]     Please be aware that coverage of these services is subject to the terms and limitations of your health insurance plan.  Call member services at your health plan with any benefit or coverage questions.      Please bring the following to your appointment:  >>   Any x-rays, CTs or MRIs which have been performed.  Contact the facility where they were done to arrange for  prior to your scheduled appointment.    >>   List of current medications   >>   This referral request   >>   Any documents/labs given to you for this referral                  Your next 10 appointments already scheduled     Jun 26, 2017 10:00 AM CDT   XR COLON AIR CONTRAST with MGXR1, MG BREAST/FLUORO RAD   [facility]    [address] [address] [place] [id number]    [phone]           Your exam will take about one hour. If you think you might be pregnant, tell your doctor before your exam.  Your bowel (insides) must be empty to get a clear picture. Follow these guidelines:   The night before your exam:Take 10 ounces magnesium citrate at 6 p.m. the night before your exam.   Take 3 Dulcolax tablets at 8 p.m. Swallow the tablets whole, do not chew or crush them. Do not take within 1 hour of antacids.   1 Dulcolax rectal suppository. Take this the morning of your exam if your stools are not clear by this time. If your stools are clear, you don t need to take this. Do not use a suppository if you are having an air contrast colon.   Follow a  non-residue diet  for at least 48 hours. This means no fruits, vegetables, nuts, seeds or whole grains.   Do not eat, chew gum or smoke for 8 hours before your exam.   Keep drinking clear liquids until 2 hours before your exam. Clear liquids include water, clear juice, black coffee or clear tea without milk, Gatorade, clear soda.   Please bring a list of your current medicines to your exam. (Include vitamins, minerals and over-the-counter medicines.) Leave your valuables at home.  Please call the Imaging Department at your exam site with any questions.              Future tests that were ordered for you today     Open Future Orders        Priority Expected Expires Ordered    XR Colon Air contrast Routine 6/22/2017 6/22/2018 6/22/2017            Who to contact     If you have questions or need follow up information about today's clinic visit or your schedule please contact [facility] directly at [phone].  Normal or non-critical lab and imaging results will be communicated to you by MyChart, letter or phone within 4 business days after the clinic has received the results. If you do not hear from us within 7 days, please contact the clinic through MyChart or phone. If you have a critical or abnormal lab result, we  "will notify you by phone as soon as possible.  Submit refill requests through [employer] or call your pharmacy and they will forward the refill request to us. Please allow 3 business days for your refill to be completed.          Additional Information About Your Visit        [employer] Information     [employer] lets you send messages to your doctor, view your test results, renew your prescriptions, schedule appointments and more. To sign up, go to [email or web address] . Click on \"Log in\" on the left side of the screen, which will take you to the Welcome page. Then click on \"Sign up Now\" on the right side of the page.     You will be asked to enter the access code listed below, as well as some personal information. Please follow the directions to create your username and password.     Your access code is: [email or web address]  Expires: 2017  8:34 AM     Your access code will  in 90 days. If you need help or a new code, please call your [facility] clinic or [phone].        Care EveryWhere ID     This is your Care EveryWhere ID. This could be used by other organizations to access your [facility] medical records  [id number]        Your Vitals Were     Pulse Temperature Pulse Oximetry BMI (Body Mass Index)          73 97.2  F (36.2  C) (Oral) 96% 24.22 kg/m2         Blood Pressure from Last 3 Encounters:   17 162/82   02/10/17 125/70   16 155/77    Weight from Last 3 Encounters:   17 124 lb (56.2 kg)   02/10/17 122 lb (55.3 kg)   16 118 lb (53.5 kg)              We Performed the Following     CBC with platelets differential     EKG 12-lead complete w/read - Clinics     Hepatic panel (Albumin, ALT, AST, Bili, Alk Phos, TP)     Lipase     PHYSIATRY REFERRAL     UA with Microscopic reflex to Culture        Primary Care Provider Office Phone # Fax #    Radha Sheets [phone]       [facility] [address] [facility] [address]        Equal Access " to Services     JAX LANGFORD : Marvin Mohamud, wasugar avina, qaybjoellen kaalsofya gamboa. So [facility] [phone].    ATENCIÓN: Si habla cedrick, tiene a kaye disposición servicios gratuitos de asistencia lingüística. Llame al [phone].    We comply with applicable federal civil rights laws and [place] laws. We do not discriminate on the basis of race, color, national origin, age, disability sex, sexual orientation or gender identity.            Thank you!     Thank you for choosing [facility]  for your care. Our goal is always to provide you with excellent care. Hearing back from our patients is one way we can continue to improve our services. Please take a few minutes to complete the written survey that you may receive in the mail after your visit with us. Thank you!             Your Updated Medication List - Protect others around you: Learn how to safely use, store and throw away your medicines at www.disposemymeds.org.          This list is accurate as of: 6/22/17  8:43 AM.  Always use your most recent med list.                   Brand Name Dispense Instructions for use Diagnosis    azithromycin 250 MG tablet    ZITHROMAX     1 tablet every M,W,F        COLACE PO           levothyroxine 25 MCG tablet    SYNTHROID    90 tablet    Take 1 tablet (25 mcg) by mouth daily    Hypothyroidism due to acquired atrophy of thyroid       magnesium 250 MG tablet      Take 1 tablet by mouth daily        polyethylene glycol Packet    MIRALAX/GLYCOLAX     Take 1 packet by mouth daily        pravastatin 10 MG tablet    PRAVACHOL    90 tablet    Take 1 tablet (10 mg) by mouth daily    Hyperlipidemia LDL goal <100       psyllium Wafr      Take 2 Wafers by mouth daily    Slow transit constipation       TYLENOL ARTHRITIS EXT RELIEF OR      as needed        Vitamin D (Cholecalciferol) 1000 UNITS Caps      Take by mouth 2 times daily

## 2017-06-22 NOTE — PROGRESS NOTES
"  SUBJECTIVE:                                                    Ingrid Culver is a 84 year old female who presents to clinic today for the following health issues:    83 y/o WF with \"change in BM\"       at last visit pt declined fosamax treatment for osteopenia with high FRAX score, she still declines... .    Bronchiecastsis sx have been controlled and she takes azithormycin 3X per week for suppression.    (D/c magnesium and thyroid med)  Concern - nausea     Onset: 1 month     Description:   Feels nausea every day     Intensity: moderate    Progression of Symptoms:  intermittent    Accompanying Signs & Symptoms:  None        Previous history of similar problem:   no    Precipitating factors:   Worsened by: bowel movements     Alleviating factors:  Improved by: nothing         Therapies Tried and outcome:     Abdominal tightness  X 2 years \"feels like something is pressing against her diaphram)  Change in bowel movements -more soft  Milk or a BM might settle the nausea down.     Normal Abd CT scan 2015 for \"abdominal pain\".  Colonoscopy = 0.1 and 0.5 mm polyps 2012.  Endoscopy 2012 = inflammation due to NSAID.  Feels like there is a ball or a knot in her upper abdomen.  Or like there is a belt tightening around her upper abdomen.  None of this is affected by activity.  The tighness is more evident when standing.     Appetite is good.  No weight loss    She uses miralax, fiber, stool softeners to stay regular. Unable to cut back or gets constipated.     She also has pain in left upper lumbar area which wraps around her flank.  No radiation into leg.  No  problems.   Seems to affect her balance. Wears a back brace which does help her with activities.     Has had CT chest last year at [place] and negative stress test.... ([facility] ER visit)    Problem list and histories reviewed & adjusted, as indicated.  Additional history: as documented    Patient Active Problem List   Diagnosis     SHOULDER IMPINGEMENT - left     " Hyperlipidemia LDL goal <130     Advanced directives, counseling/discussion     Insomnia     Trigger finger, acquired - right ring     Trigger thumb - right     Bronchiectasis (H)     Cerebral infarction (H)     Vitreous syneresis     PVD (posterior vitreous detachment)     Pseudophakia OU     Esotropia chronic c prism     Dry eyes, bilateral     Other idiopathic scoliosis, thoracolumbar region     Ganglion cyst of finger of right hand     Hypertension goal BP (blood pressure) < 150/90     Hypothyroidism due to acquired atrophy of thyroid     Past Surgical History:   Procedure Laterality Date     APPENDECTOMY       BREAST BIOPSY, RT/LT      Breat Biopsy RT/LT     C TOTAL KNEE ARTHROPLASTY  06/25/03    left poly exchange     COLONOSCOPY  06/00   And 2008    diverticulosis     COLONOSCOPY  1-13-12     ENDOSCOPY  1-13-12     EXTRACAPSULAR CATARACT EXTRATION WITH INTRAOCULAR LENS IMPLANT  01/2000    both eyes     FLEXIBLE SIGMOIDOSCOPY  06/95,08/98     JOINT REPLACEMTN, KNEE RT/LT  92    right       Social History   Substance Use Topics     Smoking status: Former Smoker     Types: Cigarettes     Quit date: 11/9/1974     Smokeless tobacco: Never Used     Alcohol use Yes      Comment: occasional glass of wine     Family History   Problem Relation Age of Onset     Breast Cancer Mother      Arthritis Mother      Thyroid Disease Mother      Cardiovascular Father      Asthma Maternal Grandmother      CANCER Brother      CANCER Son      tonsil and tongue, bladder     Alcohol/Drug Son      HEART DISEASE Daughter      Glaucoma No family hx of      Macular Degeneration No family hx of          Current Outpatient Prescriptions   Medication Sig Dispense Refill     magnesium 250 MG tablet Take 1 tablet by mouth daily       Vitamin D, Cholecalciferol, 1000 UNITS CAPS Take by mouth 2 times daily       levothyroxine (SYNTHROID) 25 MCG tablet Take 1 tablet (25 mcg) by mouth daily 90 tablet 3     pravastatin (PRAVACHOL) 10 MG tablet Take  1 tablet (10 mg) by mouth daily 90 tablet 3     polyethylene glycol (MIRALAX/GLYCOLAX) Packet Take 1 packet by mouth daily       Docusate Sodium (COLACE PO)        psyllium (METAMUCIL) WAFR Take 2 Wafers by mouth daily       azithromycin (ZITHROMAX) 250 MG tablet 1 tablet every M,W,F       TYLENOL ARTHRITIS EXT RELIEF OR as needed       Allergies   Allergen Reactions     Zocor [Hmg-Coa-R Inhibitors]      Recent Labs   Lab Test  12/19/16   1037 05/31/16 04/11/16  12/16/15   1051  12/11/14   0945   11/30/12   0742   LDL  86   --    --   91  70   < >  99   HDL  68   --    --   63  55   < >  49*   TRIG  152*   --    --   156*  138   < >  121   ALT   --    --    --   32  21   --   25   CR  0.71  0.75  0.77  0.83  0.78   < >  0.80   GFRESTIMATED  79   --    --   65  70   < >  69   GFRESTBLACK  >90   GFR Calc     --    --   79  85   < >  84   POTASSIUM  4.2   --   4.2  4.9  4.0   < >  4.2   TSH  2.26   --    --   2.04  1.63   < >  2.70    < > = values in this interval not displayed.      BP Readings from Last 3 Encounters:   06/22/17 162/82   02/10/17 125/70   12/19/16 155/77    Wt Readings from Last 3 Encounters:   06/22/17 124 lb (56.2 kg)   02/10/17 122 lb (55.3 kg)   12/19/16 118 lb (53.5 kg)                  Labs reviewed in EPIC    Reviewed and updated as needed this visit by clinical staff  Tobacco  Allergies  Med Hx  Surg Hx  Fam Hx  Soc Hx      Reviewed and updated as needed this visit by Provider         ROS:  Constitutional, HEENT, cardiovascular, pulmonary, and gu systems are negative, except as otherwise noted.    OBJECTIVE:                                                    /82 (BP Location: Right arm, Patient Position: Chair, Cuff Size: Adult Regular)  Pulse 73  Temp 97.2  F (36.2  C) (Oral)  Wt 124 lb (56.2 kg)  SpO2 96%  BMI 24.22 kg/m2  Body mass index is 24.22 kg/(m^2).  GENERAL: healthy, alert and no distress  EYES: Eyes grossly normal to inspection, PERRL and  conjunctivae and sclerae normal  NECK: no adenopathy, no asymmetry, masses, or scars and thyroid normal to palpation  RESP: lungs clear to auscultation - no rales, rhonchi or wheezes  CV: regular rate and rhythm, normal S1 S2, no S3 or S4, no murmur, click or rub, no peripheral edema and peripheral pulses strong  ABDOMEN: soft, nontender, no hepatosplenomegaly, no masses and bowel sounds normal  MS: no gross musculoskeletal defects noted, no edema  MS: moderate - severe thoracolumbar scolisos  SKIN: no suspicious lesions or rashes  NEURO: Normal strength and tone for age, mentation intact and speech normal    Diagnostic Test Results:  Results for orders placed or performed in visit on 06/22/17 (from the past 24 hour(s))   Hepatic panel (Albumin, ALT, AST, Bili, Alk Phos, TP)   Result Value Ref Range    Bilirubin Direct <0.1 0.0 - 0.2 mg/dL    Bilirubin Total 0.3 0.2 - 1.3 mg/dL    Albumin 3.6 3.4 - 5.0 g/dL    Protein Total 7.6 6.8 - 8.8 g/dL    Alkaline Phosphatase 55 40 - 150 U/L    ALT 22 0 - 50 U/L    AST 20 0 - 45 U/L   CBC with platelets differential   Result Value Ref Range    WBC 6.1 4.0 - 11.0 10e9/L    RBC Count 4.46 3.8 - 5.2 10e12/L    Hemoglobin 13.5 11.7 - 15.7 g/dL    Hematocrit 40.7 35.0 - 47.0 %    MCV 91 78 - 100 fl    MCH 30.3 26.5 - 33.0 pg    MCHC 33.2 31.5 - 36.5 g/dL    RDW 13.9 10.0 - 15.0 %    Platelet Count 315 150 - 450 10e9/L    Diff Method Automated Method     % Neutrophils 50.4 %    % Lymphocytes 31.4 %    % Monocytes 12.3 %    % Eosinophils 4.4 %    % Basophils 1.5 %    Absolute Neutrophil 3.1 1.6 - 8.3 10e9/L    Absolute Lymphocytes 1.9 0.8 - 5.3 10e9/L    Absolute Monocytes 0.8 0.0 - 1.3 10e9/L    Absolute Eosinophils 0.3 0.0 - 0.7 10e9/L    Absolute Basophils 0.1 0.0 - 0.2 10e9/L   Lipase   Result Value Ref Range    Lipase 99 73 - 393 U/L   UA with Microscopic reflex to Culture   Result Value Ref Range    Color Urine Yellow     Appearance Urine Clear     Glucose Urine Negative NEG  mg/dL    Bilirubin Urine Negative NEG    Ketones Urine Negative NEG mg/dL    Specific Gravity Urine <=1.005 1.003 - 1.035    pH Urine 7.0 5.0 - 7.0 pH    Protein Albumin Urine Negative NEG mg/dL    Urobilinogen Urine 0.2 0.2 - 1.0 EU/dL    Nitrite Urine Negative NEG    Blood Urine Trace (A) NEG    Leukocyte Esterase Urine Small (A) NEG    Source Midstream Urine     WBC Urine 2-5 (A) 0 - 2 /HPF    RBC Urine O - 2 0 - 2 /HPF    Squamous Epithelial /LPF Urine Few FEW /LPF    Bacteria Urine Few (A) NEG /HPF        ASSESSMENT/PLAN:                                                          ICD-10-CM    1. Bronchiectasis without complication (H) J47.9    2. Hypertension goal BP (blood pressure) < 150/90 I10    3. Hypothyroidism due to acquired atrophy of thyroid E03.4    4. Atypical chest pain R07.89 EKG 12-lead complete w/read - Clinics   5. Left-sided low back pain without sciatica, unspecified chronicity M54.5 CANCELED: XR Lumbar Spine 2/3 Views     CANCELED: XR Pelvis 1/2 Views   6. Abdominal pain, generalized R10.84 UA with Microscopic reflex to Culture     XR Abdomen 2 Views     Hepatic panel (Albumin, ALT, AST, Bili, Alk Phos, TP)     CBC with platelets differential     Lipase     XR Colon Air contrast   7. Thoracogenic scoliosis of thoracolumbar region M41.35 PHYSIATRY REFERRAL       Patient with severe scoliosis.  Likely the etiology of her symptoms.  She is quite functional now.  Is interested in PMR consult for opinion going forward (any more specific therapy recommendations/s    Will check BariumEnema to visualize colon for any masses.  Colonoscopy may be to risky.  If this study is negative, would feel quite confident that there is no GI malignancy.     Complex care and multifacted work up today.     Need to address that she is on levothyroxine for minimal hypothyroid at next visit         Radha Sheets MD  [facility]

## 2017-06-22 NOTE — LETTER
[facility]  [address] [place]  [address]  [phone]        June 23, 2017    Ingrid Culver  [address] [place] [id number]        Dear Ingrid,    Enclosed are your results.  Your labs are normal/stable at this time.   The urine I think is a bit contaminated, but no infection.     Please call  with any questions.  We can also discuss any questions regarding these labs at your next scheduled visit.    Results for orders placed or performed in visit on 06/22/17   UA with Microscopic reflex to Culture   Result Value Ref Range    Color Urine Yellow     Appearance Urine Clear     Glucose Urine Negative NEG mg/dL    Bilirubin Urine Negative NEG    Ketones Urine Negative NEG mg/dL    Specific Gravity Urine <=1.005 1.003 - 1.035    pH Urine 7.0 5.0 - 7.0 pH    Protein Albumin Urine Negative NEG mg/dL    Urobilinogen Urine 0.2 0.2 - 1.0 EU/dL    Nitrite Urine Negative NEG    Blood Urine Trace (A) NEG    Leukocyte Esterase Urine Small (A) NEG    Source Midstream Urine     WBC Urine 2-5 (A) 0 - 2 /HPF    RBC Urine O - 2 0 - 2 /HPF    Squamous Epithelial /LPF Urine Few FEW /LPF    Bacteria Urine Few (A) NEG /HPF   Hepatic panel (Albumin, ALT, AST, Bili, Alk Phos, TP)   Result Value Ref Range    Bilirubin Direct <0.1 0.0 - 0.2 mg/dL    Bilirubin Total 0.3 0.2 - 1.3 mg/dL    Albumin 3.6 3.4 - 5.0 g/dL    Protein Total 7.6 6.8 - 8.8 g/dL    Alkaline Phosphatase 55 40 - 150 U/L    ALT 22 0 - 50 U/L    AST 20 0 - 45 U/L   CBC with platelets differential   Result Value Ref Range    WBC 6.1 4.0 - 11.0 10e9/L    RBC Count 4.46 3.8 - 5.2 10e12/L    Hemoglobin 13.5 11.7 - 15.7 g/dL    Hematocrit 40.7 35.0 - 47.0 %    MCV 91 78 - 100 fl    MCH 30.3 26.5 - 33.0 pg    MCHC 33.2 31.5 - 36.5 g/dL    RDW 13.9 10.0 - 15.0 %    Platelet Count 315 150 - 450 10e9/L    Diff Method Automated Method     % Neutrophils 50.4 %    % Lymphocytes 31.4 %    % Monocytes 12.3 %    %  Eosinophils 4.4 %    % Basophils 1.5 %    Absolute Neutrophil 3.1 1.6 - 8.3 10e9/L    Absolute Lymphocytes 1.9 0.8 - 5.3 10e9/L    Absolute Monocytes 0.8 0.0 - 1.3 10e9/L    Absolute Eosinophils 0.3 0.0 - 0.7 10e9/L    Absolute Basophils 0.1 0.0 - 0.2 10e9/L   Lipase   Result Value Ref Range    Lipase 99 73 - 393 U/L       If you have any questions please call the clinic at [phone].    Sincerely,    Radha PEREZ

## 2017-06-22 NOTE — NURSING NOTE
Chief Complaint   Patient presents with     Patient Request     change in bowel movements      Health Maintenance     eye      Nausea     Patient Request     abdominal tightness        Initial /82 (BP Location: Right arm, Patient Position: Chair, Cuff Size: Adult Regular)  Pulse 73  Temp 97.2  F (36.2  C) (Oral)  Wt 124 lb (56.2 kg)  SpO2 96%  BMI 24.22 kg/m2 Estimated body mass index is 24.22 kg/(m^2) as calculated from the following:    Height as of 2/10/17: 5' (1.524 m).    Weight as of this encounter: 124 lb (56.2 kg).  Medication Reconciliation: complete   Ania Lopez ma

## 2017-06-22 NOTE — PATIENT INSTRUCTIONS
[facility] [phone]   [email or web address]     Barium enema colon contrast study    Return to clinic for annual physical December 2017 (or sooner if needed)

## 2017-06-26 ENCOUNTER — RADIANT APPOINTMENT (OUTPATIENT)
Dept: GENERAL RADIOLOGY | Facility: CLINIC | Age: 82
End: 2017-06-26
Attending: INTERNAL MEDICINE
Payer: COMMERCIAL

## 2017-06-26 DIAGNOSIS — R10.84 ABDOMINAL PAIN, GENERALIZED: ICD-10-CM

## 2017-06-26 PROCEDURE — 99207 XR COLON BARIUM: CPT | Performed by: RADIOLOGY

## 2017-06-28 ENCOUNTER — TELEPHONE (OUTPATIENT)
Dept: FAMILY MEDICINE | Facility: CLINIC | Age: 82
End: 2017-06-28

## 2017-06-28 NOTE — TELEPHONE ENCOUNTER
RN does not see recent forms encounter.    Will flag for TC to investigate more.    Yamilet Paz RN  [facility]

## 2017-06-28 NOTE — TELEPHONE ENCOUNTER
Reason for Call:  Other call back    Detailed comments: Patient had some documents sent over from another clinic, was wondering if Dr. Sheets had received them yet.     Phone Number Patient can be reached at: Cell number on file:    Telephone Information:   Mobile [phone]       Best Time: Anytime    Can we leave a detailed message on this number? YES    Call taken on 6/28/2017 at 3:56 PM by Ana Luisa Dugan

## 2017-07-06 ENCOUNTER — TELEPHONE (OUTPATIENT)
Dept: FAMILY MEDICINE | Facility: CLINIC | Age: 82
End: 2017-07-06

## 2017-07-06 DIAGNOSIS — R14.1 FLATULENCE, ERUCTATION, AND GAS PAIN: ICD-10-CM

## 2017-07-06 DIAGNOSIS — Z53.09: ICD-10-CM

## 2017-07-06 DIAGNOSIS — R63.4 LOSS OF WEIGHT: ICD-10-CM

## 2017-07-06 DIAGNOSIS — R10.84 ABDOMINAL PAIN, GENERALIZED: Primary | ICD-10-CM

## 2017-07-06 DIAGNOSIS — R14.3 FLATULENCE, ERUCTATION, AND GAS PAIN: ICD-10-CM

## 2017-07-06 DIAGNOSIS — R14.2 FLATULENCE, ERUCTATION, AND GAS PAIN: ICD-10-CM

## 2017-07-06 DIAGNOSIS — Z53.8 PROCEDURE AND TREATMENT NOT CARRIED OUT FOR OTHER REASONS: ICD-10-CM

## 2017-07-06 NOTE — TELEPHONE ENCOUNTER
Reason for Call:  Other     Detailed comments: patient was scheduled last week for a Barium Enema but they were not able to do it.  Dr Sheets called patient on Monday but she was not home.  Patient would like Dr Sheets to call her back to discuss next steps.    Phone Number Patient can be reached at: Home number on file [phone] (home)    Best Time: any    Can we leave a detailed message on this number? YES    Call taken on 7/6/2017 at 12:22 PM by Melissa Vyas

## 2017-07-06 NOTE — TELEPHONE ENCOUNTER
Do not see documentation that Dr. Sheets called. Routing to PCP. Please call or let RN know what message to relay. Thanks much!  Chey Manuel RN

## 2017-07-07 NOTE — TELEPHONE ENCOUNTER
Patient calling, she scheduled the CT for 7-20-17, but she was told that her PCP would need to write the order for the prep-package and send it in.  Pharmacy pended.    Routing to provider to place order.    Please call patient to advise when done.

## 2017-07-07 NOTE — TELEPHONE ENCOUNTER
Called and spoke with patient.  She is agreeable.  Huddled with Juliann BRANDON, who will call the U for patient then call patient.  Week of the 17th-21st is pretty open.      Yamilet Paz RN  [facility]

## 2017-07-07 NOTE — TELEPHONE ENCOUNTER
MD tried to call patient and missed.     Please call:  Alternative test as suggested by radiologist ordered so we can look at colon (without colonoscopy)    Would need to be done at [facility]   Please help schedule if patient agreeable to this evaluation

## 2017-07-10 NOTE — TELEPHONE ENCOUNTER
TC contacted Imaging Scheduling and was told to print off the following prep instructions. Patient will [facility]     2 days prior:  - Light breakfast (clear, fat-free soups, small portions skinless chicken/turkey, fish, white bread (no butter)  - Light lunch (same as above)  - Light supper at 6pm (same as above)    Day before:  - Drink clear liquids throughout morning (water, no-pulp juices, clear broth/bouillon, coffee/tea (no cream/milk), Gatorade, soft drinks (carbonated or not), Daniele-Aid, plain Jell-O, popsicles  - Noon: Drink 1/2 bottle Redi-Cat  - 1-5 pm: Each hour, on the hour, drink at least 8 oz clear liquids  - 5:30 pm: Drink 10 oz bottle Magnesium Citrate (expect bowel movement in 30 min to 6 hours)  - 6 pm: Drink 1/2 bottle Redi-Cat plus clear liquids  - 7 pm: Take 4 Bisacodyul tablets  - 8 pm: Drink at least 8 oz clear liquids  - 9 pm: Drink both bottles of Gastroview (DO NOT DILUTE)  - After 9:30 pm until after procedure: No food or drinks    Once meds have been ordered, TC will contact patient with detailed instructions.

## 2017-07-11 ENCOUNTER — TELEPHONE (OUTPATIENT)
Dept: FAMILY MEDICINE | Facility: CLINIC | Age: 82
End: 2017-07-11

## 2017-07-11 DIAGNOSIS — K59.00 CONSTIPATION: Primary | ICD-10-CM

## 2017-07-11 RX ORDER — MAGNESIUM CARB/ALUMINUM HYDROX 105-160MG
296 TABLET,CHEWABLE ORAL ONCE
Qty: 296 ML | Refills: 0 | Status: SHIPPED | OUTPATIENT
Start: 2017-07-11 | End: 2017-07-11

## 2017-07-11 RX ORDER — BISACODYL 5 MG/1
TABLET, DELAYED RELEASE ORAL
Qty: 4 TABLET | Refills: 0 | Status: SHIPPED | OUTPATIENT
Start: 2017-07-11 | End: 2017-07-20

## 2017-07-11 RX ORDER — POLYETHYLENE GLYCOL 3350 17 G/17G
1 POWDER, FOR SOLUTION ORAL DAILY
Qty: 510 G | Refills: 1 | Status: SHIPPED | OUTPATIENT
Start: 2017-07-11 | End: 2017-07-20

## 2017-07-11 NOTE — TELEPHONE ENCOUNTER
Reason for Call:  Other returning call and prescription    Detailed comments:  Alejandro from [facility] has some questions about diatrizoate meglumine-sodium (GASTROGRAFIN; GASTROVIEW) 66-10 % solution    Phone Number Patient can be reached at: Other phone number:  [phone]    Best Time:  anytime    Can we leave a detailed message on this number? YES    Call taken on 7/11/2017 at 2:28 PM by Asia Barr

## 2017-07-11 NOTE — TELEPHONE ENCOUNTER
RN is unsure if these are the meds that need to be cued up - Mag Citrate had several choices; I did not cue up Readi-Cat as there are 2 different types with several choices.  Please review these meds as RN is unsure what to cue up.      Yamilet Paz RN  [facility]

## 2017-07-11 NOTE — TELEPHONE ENCOUNTER
Gastroview comes in 30mL bottles, that is what they are able to order generically.  What should patient be drinking?      Called CT: [phone]  [facility] imaging department [facility].  They do not do this at the [facility].  They also said we have outdated instructions.    Transferred to [facility]: [phone] - they send out the prep to her home.  They will send out redi-cat & Gastroview to patient.  They advise Miralax 8.3 oz whole container, Gatorade 64 oz, and 4 biscadoyl tablets.      She has 4 tabs of Dulcolax.  Will need more Miralax sent.  Called pharmacy, if patient picks up 8.3oz she will have to pay out of pocket for it.  Patient had stated she was running low on her Miralax so would need more in general.  Spoke with Мария RUFFIN, who is okay with verbally ordering 510g of Miralax.  Sent via E-prescribe.      Yamilet Paz RN  [facility]

## 2017-07-20 ENCOUNTER — TELEPHONE (OUTPATIENT)
Dept: FAMILY MEDICINE | Facility: CLINIC | Age: 82
End: 2017-07-20

## 2017-07-21 NOTE — TELEPHONE ENCOUNTER
MD called patient with colon imaging results.  Will stop fiber and use the miralax only...  Consider Linzess in future???

## 2017-08-21 DIAGNOSIS — K59.01 SLOW TRANSIT CONSTIPATION: Primary | ICD-10-CM

## 2017-08-22 RX ORDER — POLYETHYLENE GLYCOL 3350 17 G/17G
POWDER, FOR SOLUTION ORAL
Qty: 510 G | Refills: 3 | Status: SHIPPED | OUTPATIENT
Start: 2017-08-22 | End: 2018-02-14

## 2017-08-22 NOTE — TELEPHONE ENCOUNTER
polyethylene glycol (MIRALAX/GLYCOLAX) Packet      Last Written Prescription Date:  na  Last Fill Quantity: na,   # refills: na  Last Office Visit with G, P or [facility] prescribing provider: 6/22/17  Future Office visit:       Routing refill request to provider for review/approval because:  Medication is reported/historical

## 2017-08-22 NOTE — TELEPHONE ENCOUNTER
Routing refill request to provider for review/approval because:  Medication is reported/historical; appears this is for colonoscopy prep?    Olga Wooten RN  [facility]

## 2017-09-06 ENCOUNTER — OFFICE VISIT (OUTPATIENT)
Dept: PHYSICAL MEDICINE AND REHAB | Facility: CLINIC | Age: 82
End: 2017-09-06

## 2017-09-06 VITALS — DIASTOLIC BLOOD PRESSURE: 77 MMHG | SYSTOLIC BLOOD PRESSURE: 181 MMHG | HEIGHT: 60 IN

## 2017-09-06 DIAGNOSIS — M41.9 KYPHOSCOLIOSIS: Primary | ICD-10-CM

## 2017-09-06 ASSESSMENT — PAIN SCALES - GENERAL: PAINLEVEL: MILD PAIN (2)

## 2017-09-06 NOTE — NURSING NOTE
Chief Complaint   Patient presents with     Consult     Thoracogenic scoliosis, balance problems    Bhavin Wesley, [facility]

## 2017-09-06 NOTE — PROGRESS NOTES
Referred for evaluation of Chronic LBP. Radiates around the left flank to the front.  Hx of back pain 3-4 years when she started to complain of pain. But has had pain for many years. Has seen chiropractor.  LBP affects walking. Work out 2-3 times per week.  Attends [employer]. Also does aerobic and stretching. Also does weight lifting and TM exercise. Has a .    Denies pain referring into legs. No n/t in legs. No weakness in LEs. No B or B incontinence. Has chronic constipation.    Uses tylenol prn.    On exam, patient has significant kyphoscoliosis. Left hip about 6 cm higher than right.  ROM of spine WNL without significant discomfort.  Ambulation normal.  Reflexes and strength and sensation to LT is normal in LEs.  Hip flexors and hamstrings are quite flexible.    Assessment: patient has chronic back and left flank pain. No evidence of radiculopathy. Has numerous mechanical issues caused by Kyphoscoliosis. Discussed with patient and daughter. I recommended a number of exercises for her to do and also some exercises to have her  help her to develop.  Answered all questions. Patient to return to clinic on prn basis.    Total time >45 min    ASHLEY Walden MD

## 2017-09-06 NOTE — MR AVS SNAPSHOT
After Visit Summary   9/6/2017    Ingrid Culver    MRN: [id number]           Patient Information     Date Of Birth          9/20/1932        Visit Information        Provider Department      9/6/2017 10:30 AM Josue Walden MD [facility] Physical Medicine and Rehabilitation        Today's Diagnoses     Kyphoscoliosis    -  1       Follow-ups after your visit        Follow-up notes from your care team     Return if symptoms worsen or fail to improve.      Who to contact     Please call your clinic at [phone] to:    Ask questions about your health    Make or cancel appointments    Discuss your medicines    Learn about your test results    Speak to your doctor   If you have compliments or concerns about an experience at your clinic, or if you wish to file a complaint, please contact [facility] Patient Relations at [phone] or email us at [email or web address]         Additional Information About Your Visit        MyChart Information     [employer] gives you secure access to your electronic health record. If you see a primary care provider, you can also send messages to your care team and make appointments. If you have questions, please call your primary care clinic.  If you do not have a primary care provider, please call [phone] and they will assist you.      [employer] is an electronic gateway that provides easy, online access to your medical records. With [employer], you can request a clinic appointment, read your test results, renew a prescription or communicate with your care team.     To access your existing account, please contact your [facility] or call [phone] for assistance.        Care EveryWhere ID     This is your Care EveryWhere ID. This could be used by other organizations to access your [place] medical records  [id number]        Your Vitals Were     Height                   1.524 m (5')            Blood  Pressure from Last 3 Encounters:   09/06/17 181/77   06/22/17 162/82   02/10/17 125/70    Weight from Last 3 Encounters:   06/22/17 56.2 kg (124 lb)   02/10/17 55.3 kg (122 lb)   12/19/16 53.5 kg (118 lb)              Today, you had the following     No orders found for display       Primary Care Provider Office Phone # Fax #    Radha Sheets [phone]       [address] [facility] [address]        Equal Access to Services     [facility]: Hadii jeanette Mohamud, waaxda kailyn, qaybta kaalmatamra ibarra, sofya brown . So [facility] [phone].    ATENCIÓN: Si habla español, tiene a kaye disposición servicios gratuitos de asistencia lingüística. Llame al [phone].    We comply with applicable federal civil rights laws and [place] laws. We do not discriminate on the basis of race, color, national origin, age, disability sex, sexual orientation or gender identity.            Thank you!     Thank you for choosing [facility] PHYSICAL MEDICINE AND REHABILITATION  for your care. Our goal is always to provide you with excellent care. Hearing back from our patients is one way we can continue to improve our services. Please take a few minutes to complete the written survey that you may receive in the mail after your visit with us. Thank you!             Your Updated Medication List - Protect others around you: Learn how to safely use, store and throw away your medicines at www.disposemymeds.org.          This list is accurate as of: 9/6/17 11:43 AM.  Always use your most recent med list.                   Brand Name Dispense Instructions for use Diagnosis    azithromycin 250 MG tablet    ZITHROMAX     1 tablet every M,W,F        COLACE PO           levothyroxine 25 MCG tablet    SYNTHROID    90 tablet    Take 1 tablet (25 mcg) by mouth daily    Hypothyroidism due to acquired atrophy of thyroid       magnesium 250 MG tablet      Take 1 tablet by mouth  daily        * polyethylene glycol Packet    MIRALAX/GLYCOLAX     Take 1 packet by mouth daily        * polyethylene glycol powder    MIRALAX/GLYCOLAX    510 g    TAKE 17 GRAMS BY MOUTH DAILY. TAKE 8.3 OZ (238 GRAMS) FOR IMAGING PREP AS DIRECTED    Slow transit constipation       pravastatin 10 MG tablet    PRAVACHOL    90 tablet    Take 1 tablet (10 mg) by mouth daily    Hyperlipidemia LDL goal <100       psyllium Wafr      Take 2 Wafers by mouth daily    Slow transit constipation       TYLENOL ARTHRITIS EXT RELIEF OR      as needed        Vitamin D (Cholecalciferol) 1000 UNITS Caps      Take by mouth 2 times daily        * Notice:  This list has 2 medication(s) that are the same as other medications prescribed for you. Read the directions carefully, and ask your doctor or other care provider to review them with you.

## 2017-09-06 NOTE — LETTER
9/6/2017     RE: Ingrid Culver  [address] [place] [id number]     Dear Colleague,    Thank you for referring your patient, Ingrid Culver, to the [facility] PHYSICAL MEDICINE AND REHABILITATION at [facility]. Please see a copy of my visit note below.    Referred for evaluation of Chronic LBP. Radiates around the left flank to the front.  Hx of back pain 3-4 years when she started to complain of pain. But has had pain for many years. Has seen chiropractor.  LBP affects walking. Work out 2-3 times per week.  Attends [employer]. Also does aerobic and stretching. Also does weight lifting and TM exercise. Has a .    Denies pain referring into legs. No n/t in legs. No weakness in LEs. No B or B incontinence. Has chronic constipation.    Uses tylenol prn.    On exam, patient has significant kyphoscoliosis. Left hip about 6 cm higher than right.  ROM of spine WNL without significant discomfort.  Ambulation normal.  Reflexes and strength and sensation to LT is normal in LEs.  Hip flexors and hamstrings are quite flexible.    Assessment: patient has chronic back and left flank pain. No evidence of radiculopathy. Has numerous mechanical issues caused by Kyphoscoliosis. Discussed with patient and daughter. I recommended a number of exercises for her to do and also some exercises to have her  help her to develop.  Answered all questions. Patient to return to clinic on prn basis.    Total time >45 min  ASHLEY Walden MD

## 2017-12-01 ENCOUNTER — TELEPHONE (OUTPATIENT)
Dept: FAMILY MEDICINE | Facility: CLINIC | Age: 82
End: 2017-12-01

## 2017-12-01 ENCOUNTER — TRANSFERRED RECORDS (OUTPATIENT)
Dept: HEALTH INFORMATION MANAGEMENT | Facility: CLINIC | Age: 82
End: 2017-12-01

## 2017-12-01 NOTE — TELEPHONE ENCOUNTER
Ingrid Culver is a 85 year old female who calls with symptoms for last 3 or 4 weeks - dizzy, sweaty, heavy feeling - when first started was once in awhile yesterday had 3 episodes, heavy pressure , has had 1 episode today.    NURSING ASSESSMENT:  ADMITS: intermittent tightness, pressure accompanied by dizziness and sweating  Description:  Does not matter what patient is doing - woke up with this episode this morning  Onset/duration:  A few minutes up to about 20 minutes  Precip. factors:  nothing  Associated symptoms:  Heavy feeling  Improves/worsens symptoms:  rest  Pain scale (0-10)   0/10  LMP/preg/breast feeding:  na  Last exam/Treatment:  6/22/2017  Allergies:   Allergies   Allergen Reactions     Zocor [Hmg-Coa-R Inhibitors]        MEDICATIONS:   Taking medication(s) as prescribed? Yes  Taking over the counter medication(s?) No  Any medication side effects? No significant side effects    Any barriers to taking medication(s) as prescribed?  No  Medication(s) improving/managing symptoms?  N/A  Medication reconciliation completed: N/A      NURSING PLAN: Nursing advice to patient sent to ER    RECOMMENDED DISPOSITION:  Call 911 - patient refuses to call 911 states daughter can bring her - states I feel fine right now just sweating - explained needs to go to hospital right now by ambulance.     Will comply with recommendation: No- Barriers to comply with plan of care denial by patient that there could be anything important wrong, states daughter can bring her to ER. nurse told patient to go to nearest ER. .  If further questions/concerns or if symptoms do not improve, worsen or new symptoms develop, call your PCP or [facility] as soon as possible.      Guideline used: dizziness page 192 to 194 to chest pain page 118 to 121  Telephone Triage Protocols for Nurses, Fifth Edition, Radha Maddox RN

## 2017-12-02 LAB
CHOLEST SERPL-MCNC: 168 MG/DL (ref 100–199)
HDLC SERPL-MCNC: 53 MG/DL
LDLC SERPL CALC-MCNC: 97 MG/DL
NONHDLC SERPL-MCNC: 115 MG/DL
TRIGL SERPL-MCNC: 91 MG/DL

## 2017-12-06 ENCOUNTER — TRANSFERRED RECORDS (OUTPATIENT)
Dept: HEALTH INFORMATION MANAGEMENT | Facility: CLINIC | Age: 82
End: 2017-12-06

## 2017-12-06 ENCOUNTER — TELEPHONE (OUTPATIENT)
Dept: FAMILY MEDICINE | Facility: CLINIC | Age: 82
End: 2017-12-06

## 2017-12-06 NOTE — TELEPHONE ENCOUNTER
I see patient called with dizziness and was triaged to ER 12/1/17.   No show for visit with PCP yesterday.    I called Kim who reports patient went into cardiac arrest during stress test after being seen in ER.   Ended up getting transferred to [facility] where she got a stent on Sunday.   Released home on Monday.  She states patient's legs were a little swollen upon discharge but the angiogram entrance site in her groin is very bruised and spreading up her pelvis.   She has increased swelling to her legs and her feet are numb and a bit cool, Kim does not think they are blue.  Patient denies chest pain, dizziness, or shortness of breath.  She is on blood thinner.   I advised needs to be checked for possible clot, get circulation to legs evaluated due to her history of recent hospitalization and procedures and advised they take her to ER for evaluation now.   Patient's daughter verbalized understanding of and agreement with plan.    Source:  Telephone Triage Protocols for Nurses, Jun, 5th ed, numbness and tingling    Olga Wooten RN  [facility]

## 2017-12-06 NOTE — TELEPHONE ENCOUNTER
Reason for call:  Patient reporting a symptom    Symptom or request: swelling in legs, numbness in legs.  Black and blue bruising around stent entry area    Have you been treated for this before? No    Additional comments: Daughter Loni calling for patient.  Patient had stent placed on Sunday at [facility].  She was D/C'd on Monday.  Today they notices severe black and blue bruising around area and patient reporting numbness in legs, as well as swelling.  Their AVS said to call PCP.  Please call to advise.  Pharmacy pended.  Informed that PCP is out of office.    Phone Number patient can be reached at:  Other phone number:  [phone]    Best Time:  any    Can we leave a detailed message on this number:  YES    Call taken on 12/6/2017 at 9:21 AM by Latisha Carter

## 2017-12-18 ENCOUNTER — TRANSFERRED RECORDS (OUTPATIENT)
Dept: HEALTH INFORMATION MANAGEMENT | Facility: CLINIC | Age: 82
End: 2017-12-18

## 2017-12-22 LAB — PHQ9 SCORE: 4

## 2017-12-26 ENCOUNTER — OFFICE VISIT (OUTPATIENT)
Dept: FAMILY MEDICINE | Facility: CLINIC | Age: 82
End: 2017-12-26
Payer: COMMERCIAL

## 2017-12-26 VITALS
TEMPERATURE: 97.2 F | BODY MASS INDEX: 23.41 KG/M2 | HEIGHT: 61 IN | DIASTOLIC BLOOD PRESSURE: 71 MMHG | OXYGEN SATURATION: 98 % | HEART RATE: 72 BPM | WEIGHT: 124 LBS | SYSTOLIC BLOOD PRESSURE: 136 MMHG

## 2017-12-26 DIAGNOSIS — Z91.89 PNEUMOCOCCAL VACCINATION INDICATED: ICD-10-CM

## 2017-12-26 DIAGNOSIS — E78.5 HYPERLIPIDEMIA LDL GOAL <130: ICD-10-CM

## 2017-12-26 DIAGNOSIS — I65.23 ASYMPTOMATIC BILATERAL CAROTID ARTERY STENOSIS: ICD-10-CM

## 2017-12-26 DIAGNOSIS — I73.9 PERIPHERAL ARTERIAL DISEASE (H): ICD-10-CM

## 2017-12-26 DIAGNOSIS — K21.9 GASTROESOPHAGEAL REFLUX DISEASE, ESOPHAGITIS PRESENCE NOT SPECIFIED: ICD-10-CM

## 2017-12-26 DIAGNOSIS — J47.9 BRONCHIECTASIS WITHOUT COMPLICATION (H): ICD-10-CM

## 2017-12-26 DIAGNOSIS — Z23 NEED FOR PROPHYLACTIC VACCINATION AND INOCULATION AGAINST INFLUENZA: ICD-10-CM

## 2017-12-26 DIAGNOSIS — Z00.00 ROUTINE GENERAL MEDICAL EXAMINATION AT A HEALTH CARE FACILITY: Primary | ICD-10-CM

## 2017-12-26 DIAGNOSIS — I25.10 CORONARY ARTERY DISEASE INVOLVING NATIVE CORONARY ARTERY OF NATIVE HEART, ANGINA PRESENCE UNSPECIFIED: ICD-10-CM

## 2017-12-26 DIAGNOSIS — I10 HYPERTENSION GOAL BP (BLOOD PRESSURE) < 150/90: ICD-10-CM

## 2017-12-26 DIAGNOSIS — E03.4 HYPOTHYROIDISM DUE TO ACQUIRED ATROPHY OF THYROID: ICD-10-CM

## 2017-12-26 DIAGNOSIS — M85.80 OSTEOPENIA, UNSPECIFIED LOCATION: ICD-10-CM

## 2017-12-26 LAB
ANION GAP SERPL CALCULATED.3IONS-SCNC: 8 MMOL/L (ref 3–14)
BUN SERPL-MCNC: 20 MG/DL (ref 7–30)
CALCIUM SERPL-MCNC: 8.8 MG/DL (ref 8.5–10.1)
CHLORIDE SERPL-SCNC: 103 MMOL/L (ref 94–109)
CO2 SERPL-SCNC: 27 MMOL/L (ref 20–32)
CREAT SERPL-MCNC: 0.73 MG/DL (ref 0.52–1.04)
GFR SERPL CREATININE-BSD FRML MDRD: 75 ML/MIN/1.7M2
GLUCOSE SERPL-MCNC: 89 MG/DL (ref 70–99)
POTASSIUM SERPL-SCNC: 4.5 MMOL/L (ref 3.4–5.3)
SODIUM SERPL-SCNC: 138 MMOL/L (ref 133–144)
TSH SERPL DL<=0.005 MIU/L-ACNC: 2.59 MU/L (ref 0.4–4)

## 2017-12-26 PROCEDURE — 80048 BASIC METABOLIC PNL TOTAL CA: CPT | Performed by: INTERNAL MEDICINE

## 2017-12-26 PROCEDURE — 90662 IIV NO PRSV INCREASED AG IM: CPT | Performed by: INTERNAL MEDICINE

## 2017-12-26 PROCEDURE — G0009 ADMIN PNEUMOCOCCAL VACCINE: HCPCS | Performed by: INTERNAL MEDICINE

## 2017-12-26 PROCEDURE — 84443 ASSAY THYROID STIM HORMONE: CPT | Performed by: INTERNAL MEDICINE

## 2017-12-26 PROCEDURE — 99213 OFFICE O/P EST LOW 20 MIN: CPT | Mod: 25 | Performed by: INTERNAL MEDICINE

## 2017-12-26 PROCEDURE — 99397 PER PM REEVAL EST PAT 65+ YR: CPT | Mod: 25 | Performed by: INTERNAL MEDICINE

## 2017-12-26 PROCEDURE — 90732 PPSV23 VACC 2 YRS+ SUBQ/IM: CPT | Performed by: INTERNAL MEDICINE

## 2017-12-26 PROCEDURE — G0008 ADMIN INFLUENZA VIRUS VAC: HCPCS | Performed by: INTERNAL MEDICINE

## 2017-12-26 PROCEDURE — 36415 COLL VENOUS BLD VENIPUNCTURE: CPT | Performed by: INTERNAL MEDICINE

## 2017-12-26 RX ORDER — EPINEPHRINE 0.15 MG/.3ML
0.15 INJECTION INTRAMUSCULAR PRN
COMMUNITY
End: 2018-04-19

## 2017-12-26 RX ORDER — ALENDRONATE SODIUM 70 MG/1
TABLET ORAL
Qty: 12 TABLET | Refills: 3 | Status: SHIPPED | OUTPATIENT
Start: 2017-12-26 | End: 2018-04-19

## 2017-12-26 RX ORDER — METOPROLOL TARTRATE 50 MG
TABLET ORAL
Qty: 60 TABLET | Refills: 1 | COMMUNITY
Start: 2017-12-26 | End: 2018-04-11

## 2017-12-26 RX ORDER — IPRATROPIUM BROMIDE AND ALBUTEROL SULFATE 2.5; .5 MG/3ML; MG/3ML
1 SOLUTION RESPIRATORY (INHALATION) EVERY 6 HOURS PRN
COMMUNITY

## 2017-12-26 RX ORDER — ROSUVASTATIN CALCIUM 10 MG/1
10 TABLET, COATED ORAL DAILY
Qty: 30 TABLET | COMMUNITY
Start: 2017-12-26 | End: 2021-11-19

## 2017-12-26 RX ORDER — CLOPIDOGREL BISULFATE 75 MG/1
TABLET ORAL DAILY
COMMUNITY
End: 2021-07-14

## 2017-12-26 ASSESSMENT — ACTIVITIES OF DAILY LIVING (ADL)
CURRENT_FUNCTION: NO ASSISTANCE NEEDED
I_NEED_ASSISTANCE_FOR_THE_FOLLOWING_DAILY_ACTIVITIES:: NO ASSISTANCE IS NEEDED

## 2017-12-26 ASSESSMENT — PAIN SCALES - GENERAL: PAINLEVEL: NO PAIN (0)

## 2017-12-26 NOTE — PROGRESS NOTES

## 2017-12-26 NOTE — MR AVS SNAPSHOT
After Visit Summary   12/26/2017    Ingrid Culver    MRN: [id number]           Patient Information     Date Of Birth          9/20/1932        Visit Information        Provider Department      12/26/2017 9:20 AM Radha Sheets MD [facility]        Today's Diagnoses     Routine general medical examination at a health care facility    -  1    Coronary artery disease involving native coronary artery of native heart, angina presence unspecified        Hypothyroidism due to acquired atrophy of thyroid        Bronchiectasis without complication (H)        Peripheral arterial disease (H)        Osteopenia, unspecified location        Hyperlipidemia LDL goal <130        Hypertension goal BP (blood pressure) < 150/90        Need for prophylactic vaccination and inoculation against influenza        Pneumococcal vaccination indicated        Gastroesophageal reflux disease, esophagitis presence not specified          Care Instructions    Call to schedule imaging    -- Ultrasound Carotids at your convenience    Trial weekly Fosamax for bones (Empty Stomach, upright for about an hour)      Return to clinic 6 months for BP recheck.               Follow-ups after your visit        Follow-up notes from your care team     Return in about 6 months (around 6/26/2018) for BP Recheck.      Future tests that were ordered for you today     Open Future Orders        Priority Expected Expires Ordered    US Carotid Bilateral Routine  12/26/2018 12/26/2017            Who to contact     If you have questions or need follow up information about today's clinic visit or your schedule please contact [facility] directly at [phone].  Normal or non-critical lab and imaging results will be communicated to you by MyChart, letter or phone within 4 business days after the clinic has received the results. If you do not hear from us within 7 days, please contact the  "clinic through [employer] or phone. If you have a critical or abnormal lab result, we will notify you by phone as soon as possible.  Submit refill requests through [employer] or call your pharmacy and they will forward the refill request to us. Please allow 3 business days for your refill to be completed.          Additional Information About Your Visit        [employer] Information     [employer] gives you secure access to your electronic health record. If you see a primary care provider, you can also send messages to your care team and make appointments. If you have questions, please call your primary care clinic.  If you do not have a primary care provider, please call [phone] and they will assist you.        Care EveryWhere ID     This is your Care EveryWhere ID. This could be used by other organizations to access your [place] medical records  [id number]        Your Vitals Were     Pulse Temperature Height Pulse Oximetry BMI (Body Mass Index)       72 97.2  F (36.2  C) (Oral) 5' 1.42\" (1.56 m) 98% 23.11 kg/m2        Blood Pressure from Last 3 Encounters:   12/26/17 136/71   09/06/17 181/77   06/22/17 162/82    Weight from Last 3 Encounters:   12/26/17 124 lb (56.2 kg)   06/22/17 124 lb (56.2 kg)   02/10/17 122 lb (55.3 kg)              We Performed the Following     ADMIN PNEUMOCOCCAL VACCINE     Basic metabolic panel     FLU VACCINE, INCREASED ANTIGEN, PRESV FREE, AGE 65+ [51788]     PNEUMOCOCCAL VACCINE,ADULT,SQ OR IM     SCREENING QUESTIONS FOR ADULT IMMUNIZATIONS     TSH WITH FREE T4 REFLEX     VACCINE ADMINISTRATION, EACH ADDITIONAL     Vaccine Administration, Initial [20481]          Today's Medication Changes          These changes are accurate as of: 12/26/17 10:53 AM.  If you have any questions, ask your nurse or doctor.               Start taking these medicines.        Dose/Directions    alendronate 70 MG tablet   Commonly known as:  FOSAMAX   Used for:  Osteopenia, unspecified location   Started by:  " Radha Sheets MD        Take 1 tablet (70 mg) by mouth with 8oz water every 7 days 30 minutes before breakfast and remain upright during this time.   Quantity:  12 tablet   Refills:  3       ranitidine 150 MG tablet   Commonly known as:  ZANTAC   Used for:  Gastroesophageal reflux disease, esophagitis presence not specified   Started by:  Radha Sheets MD        Dose:  150 mg   Take 1 tablet (150 mg) by mouth 2 times daily as needed for heartburn   Quantity:  60 tablet   Refills:  1         Stop taking these medicines if you haven't already. Please contact your care team if you have questions.     pravastatin 10 MG tablet   Commonly known as:  PRAVACHOL   Stopped by:  Radha Sheets MD                Where to get your medicines      These medications were sent to [id number] - [place], [place] - [address] [facility]  [address] [facility] [address]     Phone:  [phone]     alendronate 70 MG tablet    ranitidine 150 MG tablet                Primary Care Provider Office Phone # Fax #    Radha Sheets [phone] [address] [facility] [address]        Equal Access to Services     [facility]: Hadii jeanette ku hadasho Soomaali, waaxda luqadaha, qaybta kaalmada adeegyada, sofya cha hayjevon brown . So [facility] [phone].    ATENCIÓN: Si habla español, tiene a kaye disposición servicios gratuitos de asistencia lingüística. [facility] [phone].    We comply with applicable federal civil rights laws and [place] laws. We do not discriminate on the basis of race, color, national origin, age, disability, sex, sexual orientation, or gender identity.            Thank you!     Thank you for choosing [facility]  for your care. Our goal is always to provide you with excellent care. Hearing back from our patients is one way we can continue to improve our services. Please take a few minutes to complete the  written survey that you may receive in the mail after your visit with us. Thank you!             Your Updated Medication List - Protect others around you: Learn how to safely use, store and throw away your medicines at www.disposemymeds.org.          This list is accurate as of: 12/26/17 10:53 AM.  Always use your most recent med list.                   Brand Name Dispense Instructions for use Diagnosis    alendronate 70 MG tablet    FOSAMAX    12 tablet    Take 1 tablet (70 mg) by mouth with 8oz water every 7 days 30 minutes before breakfast and remain upright during this time.    Osteopenia, unspecified location       aspirin 81 MG tablet      Take by mouth daily        azithromycin 250 MG tablet    ZITHROMAX     1 tablet every M,W,F        clopidogrel 75 MG tablet    PLAVIX     Take by mouth daily        COLACE PO           * ROSUVASTATIN CALCIUM PO      Take 10 mg by mouth        * CRESTOR 10 MG tablet   Generic drug:  rosuvastatin     30 tablet    Take 1 tablet (10 mg) by mouth daily    Coronary artery disease involving native coronary artery of native heart, angina presence unspecified, Hyperlipidemia LDL goal <130       EPINEPHrine 0.15 MG/0.3ML injection 2-pack    EPIPEN JR     Inject 0.15 mg into the muscle as needed for anaphylaxis        ipratropium - albuterol 0.5 mg/2.5 mg/3 mL 0.5-2.5 (3) MG/3ML neb solution    DUONEB     Take 1 vial by nebulization every 6 hours as needed for shortness of breath / dyspnea or wheezing        levothyroxine 25 MCG tablet    SYNTHROID    90 tablet    Take 1 tablet (25 mcg) by mouth daily    Hypothyroidism due to acquired atrophy of thyroid       magnesium 250 MG tablet      Take 1 tablet by mouth daily        * METOPROLOL TARTRATE PO      Take 50 mg by mouth        * metoprolol 50 MG tablet    LOPRESSOR    60 tablet    Take one (50 mg) each morning, 1/2 (25 mg) at night    Coronary artery disease involving native coronary artery of native heart, angina presence  unspecified, Hypertension goal BP (blood pressure) < 150/90       NITROTAB SL      Place 0.4 mg under the tongue        * polyethylene glycol Packet    MIRALAX/GLYCOLAX     Take 1 packet by mouth daily        * polyethylene glycol powder    MIRALAX/GLYCOLAX    510 g    TAKE 17 GRAMS BY MOUTH DAILY. TAKE 8.3 OZ (238 GRAMS) FOR IMAGING PREP AS DIRECTED    Slow transit constipation       psyllium Wafr      Take 2 Wafers by mouth daily    Slow transit constipation       ranitidine 150 MG tablet    ZANTAC    60 tablet    Take 1 tablet (150 mg) by mouth 2 times daily as needed for heartburn    Gastroesophageal reflux disease, esophagitis presence not specified       * TYLENOL PO      Take 500 mg by mouth        * TYLENOL ARTHRITIS EXT RELIEF OR      as needed        Vitamin D (Cholecalciferol) 1000 UNITS Caps      Take by mouth 2 times daily        * Notice:  This list has 8 medication(s) that are the same as other medications prescribed for you. Read the directions carefully, and ask your doctor or other care provider to review them with you.

## 2017-12-26 NOTE — PROGRESS NOTES
SUBJECTIVE:   Ingrid Culver Answers for HPI/ROS submitted by the patient on 12/26/2017   PHQ-2 Score: 1  is a 85 year old female who presents for Preventive Visit.  Are you in the first 12 months of your Medicare coverage?  No    86 y/o F with h/o bronchiectasis, scoliosis, HTN, borderline hypothyroidism, here forAFE.  Earlier this month, she presented to ER with ongoing chest heaviness and diaphoresis, troponins were mildley elevated.  Found to have a higher grade RCA lesion which was stented by Dr. ePrry on 12/3/17 at [facility].   FLP = LDL 97     Physical   Annual:     Getting at least 3 servings of Calcium per day::  Yes    Bi-annual eye exam::  NO    Dental care twice a year::  Yes    Sleep apnea or symptoms of sleep apnea::  None    Diet::  Low salt    Taking medications regularly::  Yes    Medication side effects::  None    Additional concerns today::  YES    Ability to successfully perform activities of daily living: no assistance needed  Home Safety:  No safety concerns identified  Hearing Impairment: feel that people are mumbling or not speaking clearly and difficulty understanding soft or whispered speech          Fall risk:Fall Risk Assessment not completed.    COGNITIVE SCREEN  1) Repeat 3 items (Banana, Sunrise, Chair)    2) Clock draw: NORMAL  3) 3 item recall: Recalls 3 objects  Results: 3 items recalled: COGNITIVE IMPAIRMENT LESS LIKELY    Sonia Hernandez MA    Mini-CogTM Copyright S [employer]. Licensed by the author for use in [facility]; reprinted with permission (sena@.[facility]). All rights reserved.          Reviewed and updated as needed this visit by clinical staffTobacco  Allergies  Meds  Med Hx  Surg Hx  Fam Hx  Soc Hx        Reviewed and updated as needed this visit by Provider        Social History   Substance Use Topics     Smoking status: Former Smoker     Types: Cigarettes     Quit date: 11/9/1974     Smokeless tobacco: Never Used     Alcohol use Yes       Comment: occasional glass of wine       Alcohol Use 12/26/2017   If you drink alcohol, do you typically have greater than 3 drinks per day OR greater than 7 drinks per week?   Not applicable                 Today's PHQ-2 Score: PHQ-2 ( 1999 [employer]izer) 12/26/2017   Q1: Little interest or pleasure in doing things 0   Q2: Feeling down, depressed or hopeless 1   PHQ-2 Score 1   Q1: Little interest or pleasure in doing things Not at all   Q2: Feeling down, depressed or hopeless Several days   PHQ-2 Score 1       Do you feel safe in your environment - Yes    Do you have a Health Care Directive?: Yes: Advance Directive has been received and scanned.    Current providers sharing in care for this patient include:   Patient Care Team:  Radha Sheets MD as PCP - General    The following health maintenance items are reviewed in Epic and correct as of today:  Health Maintenance   Topic Date Due     EYE EXAM Q1 YEAR  11/10/2016     INFLUENZA VACCINE (SYSTEM ASSIGNED)  09/01/2017     TSH W/ FREE T4 REFLEX Q1 YEAR  12/19/2017     FALL RISK ASSESSMENT  02/10/2018     LIPID MONITORING Q1 YEAR  12/19/2018     TETANUS IMMUNIZATION (SYSTEM ASSIGNED)  11/09/2019     DEXA Q3 YR  01/06/2020     ADVANCE DIRECTIVE PLANNING Q5 YRS  12/28/2021     PNEUMOCOCCAL  Completed     Labs reviewed in EPIC  BP Readings from Last 3 Encounters:   12/26/17 136/71   09/06/17 181/77   06/22/17 162/82    Wt Readings from Last 3 Encounters:   12/26/17 124 lb (56.2 kg)   06/22/17 124 lb (56.2 kg)   02/10/17 122 lb (55.3 kg)                  Patient Active Problem List   Diagnosis     SHOULDER IMPINGEMENT - left     Hyperlipidemia LDL goal <130     Advanced directives, counseling/discussion     Insomnia     Trigger finger, acquired - right ring     Trigger thumb - right     Bronchiectasis (H)     Cerebral infarction (H)     Vitreous syneresis     PVD (posterior vitreous detachment)     Pseudophakia OU     Esotropia chronic c prism     Dry eyes, bilateral      Other idiopathic scoliosis, thoracolumbar region     Ganglion cyst of finger of right hand     Hypertension goal BP (blood pressure) < 150/90     Hypothyroidism due to acquired atrophy of thyroid     Coronary artery disease involving native coronary artery of native heart, angina presence unspecified     Past Surgical History:   Procedure Laterality Date     APPENDECTOMY       BREAST BIOPSY, RT/LT      Breat Biopsy RT/LT     C TOTAL KNEE ARTHROPLASTY  06/25/03    left poly exchange     CARDIAC SURGERY  12/03/2017    [facility]     COLONOSCOPY  06/00   And 2008    diverticulosis     COLONOSCOPY  1-13-12     ENDOSCOPY  1-13-12     EXTRACAPSULAR CATARACT EXTRATION WITH INTRAOCULAR LENS IMPLANT  01/2000    both eyes     FLEXIBLE SIGMOIDOSCOPY  06/95,08/98     JOINT REPLACEMTN, KNEE RT/LT  92    right       Social History   Substance Use Topics     Smoking status: Former Smoker     Types: Cigarettes     Quit date: 11/9/1974     Smokeless tobacco: Never Used     Alcohol use Yes      Comment: occasional glass of wine     Family History   Problem Relation Age of Onset     Breast Cancer Mother      Arthritis Mother      Thyroid Disease Mother      Cardiovascular Father      Asthma Maternal Grandmother      CANCER Brother      CANCER Son      tonsil and tongue, bladder     Alcohol/Drug Son      HEART DISEASE Daughter      Glaucoma No family hx of      Macular Degeneration No family hx of          Current Outpatient Prescriptions   Medication Sig Dispense Refill     Acetaminophen (TYLENOL PO) Take 500 mg by mouth       ipratropium - albuterol 0.5 mg/2.5 mg/3 mL (DUONEB) 0.5-2.5 (3) MG/3ML neb solution Take 1 vial by nebulization every 6 hours as needed for shortness of breath / dyspnea or wheezing       clopidogrel (PLAVIX) 75 MG tablet Take by mouth daily       EPINEPHrine (EPIPEN JR) 0.15 MG/0.3ML injection 2-pack Inject 0.15 mg into the muscle as needed for anaphylaxis       METOPROLOL TARTRATE PO Take 50 mg by mouth        "Nitroglycerin (NITROTAB SL) Place 0.4 mg under the tongue       ROSUVASTATIN CALCIUM PO Take 10 mg by mouth       aspirin 81 MG tablet Take by mouth daily       polyethylene glycol (MIRALAX/GLYCOLAX) powder TAKE 17 GRAMS BY MOUTH DAILY. TAKE 8.3 OZ (238 GRAMS) FOR IMAGING PREP AS DIRECTED 510 g 3     magnesium 250 MG tablet Take 1 tablet by mouth daily       Vitamin D, Cholecalciferol, 1000 UNITS CAPS Take by mouth 2 times daily       levothyroxine (SYNTHROID) 25 MCG tablet Take 1 tablet (25 mcg) by mouth daily 90 tablet 3     pravastatin (PRAVACHOL) 10 MG tablet Take 1 tablet (10 mg) by mouth daily (Patient not taking: Reported on 12/26/2017) 90 tablet 3     polyethylene glycol (MIRALAX/GLYCOLAX) Packet Take 1 packet by mouth daily       Docusate Sodium (COLACE PO)        psyllium (METAMUCIL) WAFR Take 2 Wafers by mouth daily       azithromycin (ZITHROMAX) 250 MG tablet 1 tablet every M,W,F       TYLENOL ARTHRITIS EXT RELIEF OR as needed       Allergies   Allergen Reactions     Zocor [Hmg-Coa-R Inhibitors]      Recent Labs   Lab Test  06/22/17   0753  12/19/16   1037 05/31/16 04/11/16  12/16/15   1051  12/11/14   0945   LDL   --   86   --    --   91  70   HDL   --   68   --    --   63  55   TRIG   --   152*   --    --   156*  138   ALT  22   --    --    --   32  21   CR   --   0.71  0.75  0.77  0.83  0.78   GFRESTIMATED   --   79   --    --   65  70   GFRESTBLACK   --   >90   GFR Calc     --    --   79  85   POTASSIUM   --   4.2   --   4.2  4.9  4.0   TSH   --   2.26   --    --   2.04  1.63              Review of Systems  Constitutional, HEENT, cardiovascular, pulmonary, GI, , musculoskeletal, neuro, skin, endocrine and psych systems are negative, except as otherwise noted.      OBJECTIVE:   /71 (BP Location: Right arm, Patient Position: Chair, Cuff Size: Adult Small)  Pulse 72  Temp 97.2  F (36.2  C) (Oral)  Ht 5' 1.42\" (1.56 m)  Wt 124 lb (56.2 kg)  SpO2 98%  BMI 23.11 kg/m2 " Estimated body mass index is 24.22 kg/(m^2) as calculated from the following:    Height as of 2/10/17: 5' (1.524 m).    Weight as of 6/22/17: 124 lb (56.2 kg).  Physical Exam  GENERAL APPEARANCE: healthy, alert and no distress  EYES: Eyes grossly normal to inspection, PERRL and conjunctivae and sclerae normal  HENT: ear canals and TM's normal, nose and mouth without ulcers or lesions, oropharynx clear and oral mucous membranes moist  NECK: no adenopathy, no asymmetry, masses, or scars and thyroid normal to palpation  RESP: lungs clear to auscultation - no rales, rhonchi or wheezes     dimished BS at left med lobe.   BREAST: normal without masses, tenderness or nipple discharge and no palpable axillary masses or adenopathy  CV: regular rate and rhythm, normal S1 S2, no S3 or S4, 2/6 systolic murmur loudest at apex, click or rub, no peripheral edema and peripheral pulses strong  ABDOMEN: soft, nontender, no hepatosplenomegaly, no masses and bowel sounds normal   (female): normal female external genitalia, normal urethral meatus, vaginal mucosal atrophy noted, normal cervix, adnexae, and uterus without masses or abnormal discharge   (female): bimanual only  MS: no musculoskeletal defects are noted and gait is age appropriate without ataxia. scoliosis  SKIN: no suspicious lesions or rashes  NEURO: Normal strength and tone, sensory exam grossly normal, mentation intact and speech normal  PSYCH: mentation appears normal and affect normal/bright    ASSESSMENT / PLAN:       ICD-10-CM    1. Routine general medical examination at a health care facility Z00.00    2. Coronary artery disease involving native coronary artery of native heart, angina presence unspecified I25.10 metoprolol (LOPRESSOR) 50 MG tablet     rosuvastatin (CRESTOR) 10 MG tablet     OFFICE/OUTPT VISIT,EST,LEVL III   3. Hypothyroidism due to acquired atrophy of thyroid E03.4 TSH WITH FREE T4 REFLEX     OFFICE/OUTPT VISIT,EST,LEVL III   4. Bronchiectasis  without complication (H) J47.9 ADMIN PNEUMOCOCCAL VACCINE     DISCONTINUED: pneumococcal vaccine (PNEUMOVAX 23-BRANDI) 25 MCG/0.5ML injection   5. Peripheral arterial disease (H) I73.9 US Carotid Bilateral   6. Osteopenia, unspecified location M85.80 alendronate (FOSAMAX) 70 MG tablet     OFFICE/OUTPT VISIT,EST,LEVL III   7. Hyperlipidemia LDL goal <130 E78.5 rosuvastatin (CRESTOR) 10 MG tablet   8. Hypertension goal BP (blood pressure) < 150/90 I10 Basic metabolic panel     metoprolol (LOPRESSOR) 50 MG tablet     OFFICE/OUTPT VISIT,EST,LEVL III   9. Need for prophylactic vaccination and inoculation against influenza Z23 FLU VACCINE, INCREASED ANTIGEN, PRESV FREE, AGE 65+ [72287]     Vaccine Administration, Initial [64683]   10. Pneumococcal vaccination indicated Z91.89 ADMIN PNEUMOCOCCAL VACCINE     PNEUMOCOCCAL VACCINE,ADULT,SQ OR IM     VACCINE ADMINISTRATION, EACH ADDITIONAL     DISCONTINUED: pneumococcal vaccine (PNEUMOVAX 23-BRANDI) 25 MCG/0.5ML injection   11. Gastroesophageal reflux disease, esophagitis presence not specified K21.9 ranitidine (ZANTAC) 150 MG tablet   12. Asymptomatic bilateral carotid artery stenosis I65.23 US Carotid Bilateral     Will increase the metoprolol to 50 mg am, 25 mg pm.  F/u with cardiologist in a week as scheduled  Will start fosamax.   Side effects discussed and questions answered.     Zantac prn for intermittent gerd symptoms     End of Life Planning:  Patient currently has an advanced directive: Yes.  Practitioner is supportive of decision.    COUNSELING:  Reviewed preventive health counseling, as reflected in patient instructions       Regular exercise        Estimated body mass index is 24.22 kg/(m^2) as calculated from the following:    Height as of 2/10/17: 5' (1.524 m).    Weight as of 6/22/17: 124 lb (56.2 kg).     reports that she quit smoking about 43 years ago. Her smoking use included Cigarettes. She has never used smokeless tobacco.        Appropriate preventive  services were discussed with this patient, including applicable screening as appropriate for cardiovascular disease, diabetes, osteopenia/osteoporosis, and glaucoma.  As appropriate for age/gender, discussed screening for colorectal cancer, prostate cancer, breast cancer, and cervical cancer. Checklist reviewing preventive services available has been given to the patient.    Reviewed patients plan of care and provided an AVS. The Basic Care Plan (routine screening as documented in Health Maintenance) for Ingrid meets the Care Plan requirement. This Care Plan has been established and reviewed with the Patient and daughter.    Counseling Resources:  ATP IV Guidelines  Pooled Cohorts Equation Calculator  Breast Cancer Risk Calculator  FRAX Risk Assessment  ICSI Preventive Guidelines  Dietary Guidelines for Americans, 2010  USDA's MyPlate  ASA Prophylaxis  Lung CA Screening    Radha Sheets MD  [facility]

## 2017-12-26 NOTE — PATIENT INSTRUCTIONS
Call to schedule imaging    -- Ultrasound Carotids at your convenience    Trial weekly Fosamax for bones (Empty Stomach, upright for about an hour)      Return to clinic 6 months for BP recheck.

## 2017-12-26 NOTE — NURSING NOTE
"Chief Complaint   Patient presents with     Physical     Health Maintenance     eye,tsh        Initial /71 (BP Location: Right arm, Patient Position: Chair, Cuff Size: Adult Small)  Pulse 72  Temp 97.2  F (36.2  C) (Oral)  Ht 5' 1.42\" (1.56 m)  Wt 124 lb (56.2 kg)  SpO2 98%  BMI 23.11 kg/m2 Estimated body mass index is 23.11 kg/(m^2) as calculated from the following:    Height as of this encounter: 5' 1.42\" (1.56 m).    Weight as of this encounter: 124 lb (56.2 kg).  Medication Reconciliation: complete     Sonia Hernandez MA      "

## 2017-12-27 NOTE — PROGRESS NOTES
Ingrid Culver    Thyroid and electrolyte/kidney function results are all normal.     Sincerely,     JOSE ANGEL MUSA M.D.

## 2018-01-01 DIAGNOSIS — E03.4 HYPOTHYROIDISM DUE TO ACQUIRED ATROPHY OF THYROID: ICD-10-CM

## 2018-01-02 NOTE — TELEPHONE ENCOUNTER
Requested Prescriptions   Pending Prescriptions Disp Refills     levothyroxine (SYNTHROID/LEVOTHROID) 25 MCG tablet [Pharmacy Med Name: Levothyroxine Sodium Oral Tablet 25 MCG] 90 tablet 2    Last Written Prescription Date:  1-17-17  Last Fill Quantity: 90,  # refills: 3   Last Office Visit with [facility], [facility] or [facility] prescribing provider:  12-26-17   Future Office Visit:      Sig: TAKE ONE TABLET BY MOUTH ONE TIME DAILY    Thyroid Protocol Passed    1/1/2018  7:00 AM       Passed - Patient is 12 years or older       Passed - Recent or future visit with authorizing provider's specialty    Patient had office visit in the last year or has a visit in the next 30 days with authorizing provider.  See chart review.              Passed - Normal TSH on file in past 12 months    Recent Labs   Lab Test  12/26/17   1025   TSH  2.59             Passed - No active pregnancy on record    If patient is pregnant or has had a positive pregnancy test, please check TSH.         Passed - No positive pregnancy test in past 12 months    If patient is pregnant or has had a positive pregnancy test, please check TSH.

## 2018-01-03 ENCOUNTER — TELEPHONE (OUTPATIENT)
Dept: FAMILY MEDICINE | Facility: CLINIC | Age: 83
End: 2018-01-03

## 2018-01-03 RX ORDER — LEVOTHYROXINE SODIUM 25 UG/1
TABLET ORAL
Qty: 90 TABLET | Refills: 1 | Status: SHIPPED | OUTPATIENT
Start: 2018-01-03 | End: 2018-07-03

## 2018-01-03 NOTE — TELEPHONE ENCOUNTER
Patient called us to schedule the Carotid ultrasound you ordered and the diagnosis code does not work for her Medicare insurance.  Please change the order to a Dx code that works for her insurance and call the patient when it has been fixed so she knows when to call us back to schedule.  Thank you very much.

## 2018-01-03 NOTE — TELEPHONE ENCOUNTER
I see the diagnosis associated with the US Carotid bilateral is:      Associated Diagnoses   Osteopenia, unspecified location [M85.80]               Routed to [facility] to change diagnosis, probably has to be some sort of heart/circulatory issue?      Olga Wooten RN  [facility]

## 2018-01-03 NOTE — TELEPHONE ENCOUNTER
Prescription approved per [facility] Refill Protocol.  Ayana Carmona, RN [facility] Triage.

## 2018-01-04 NOTE — TELEPHONE ENCOUNTER
TC contacted imaging scheduling and diagnosis code is still populating an ABN. TC will contact insurance to find out what diagnosis are covered for this exam.

## 2018-01-08 ENCOUNTER — TRANSFERRED RECORDS (OUTPATIENT)
Dept: HEALTH INFORMATION MANAGEMENT | Facility: CLINIC | Age: 83
End: 2018-01-08

## 2018-01-10 NOTE — TELEPHONE ENCOUNTER
TC received a call from Imaging Scheduling. Patient has called multiple times to schedule.They state that they know the following should be covered diagnoses: carotid artery stenosis, chest pain or tightness, dizziness. TC did contact patient and inform that we are working on correcting the order. Routing to PCP to see if any of these diagnoses can be used for carotid artery ultrasound.

## 2018-01-11 NOTE — TELEPHONE ENCOUNTER
TC contacted Imaging Scheduling and order is no longer populating an ABN. Detailed message left for patient that she can call to schedule her appointment, scheduling number provided.

## 2018-01-15 ENCOUNTER — TRANSFERRED RECORDS (OUTPATIENT)
Dept: HEALTH INFORMATION MANAGEMENT | Facility: CLINIC | Age: 83
End: 2018-01-15

## 2018-01-18 ENCOUNTER — OFFICE VISIT (OUTPATIENT)
Dept: OPHTHALMOLOGY | Facility: CLINIC | Age: 83
End: 2018-01-18
Payer: COMMERCIAL

## 2018-01-18 DIAGNOSIS — Z96.1 PSEUDOPHAKIA: Primary | ICD-10-CM

## 2018-01-18 DIAGNOSIS — H50.00 ESOTROPIA: ICD-10-CM

## 2018-01-18 DIAGNOSIS — H04.123 DRY EYES, BILATERAL: ICD-10-CM

## 2018-01-18 DIAGNOSIS — H43.393 VITREOUS SYNERESIS OF BOTH EYES: ICD-10-CM

## 2018-01-18 DIAGNOSIS — H52.4 PRESBYOPIA: ICD-10-CM

## 2018-01-18 DIAGNOSIS — H43.813 POSTERIOR VITREOUS DETACHMENT OF BOTH EYES: ICD-10-CM

## 2018-01-18 PROCEDURE — 92015 DETERMINE REFRACTIVE STATE: CPT | Performed by: STUDENT IN AN ORGANIZED HEALTH CARE EDUCATION/TRAINING PROGRAM

## 2018-01-18 PROCEDURE — 92014 COMPRE OPH EXAM EST PT 1/>: CPT | Performed by: STUDENT IN AN ORGANIZED HEALTH CARE EDUCATION/TRAINING PROGRAM

## 2018-01-18 ASSESSMENT — VISUAL ACUITY
OS_CC: J1
CORRECTION_TYPE: GLASSES
OD_CC: 20/20
METHOD: SNELLEN - LINEAR
OD_CC: J1
OS_CC: 20/20-2

## 2018-01-18 ASSESSMENT — REFRACTION_MANIFEST
OD_CYLINDER: +1.25
OS_AXIS: 025
OS_CYLINDER: +1.00
OD_SPHERE: -2.00
OS_SPHERE: -1.00
OD_ADD: +3.00
OD_AXIS: 130
OS_ADD: +3.00

## 2018-01-18 ASSESSMENT — REFRACTION_WEARINGRX
OS_ADD: +3.00
OS_HPRISM: 5 BO
OS_SPHERE: -1.00
SPECS_TYPE: PAL
OD_AXIS: 130
OS_AXIS: 025
OD_SPHERE: -2.00
OD_ADD: +3.00
OD_CYLINDER: +1.50
OS_CYLINDER: +1.00

## 2018-01-18 ASSESSMENT — TONOMETRY
OD_IOP_MMHG: 17
OS_IOP_MMHG: 16
IOP_METHOD: APPLANATION

## 2018-01-18 ASSESSMENT — CONF VISUAL FIELD
OD_NORMAL: 1
OS_NORMAL: 1

## 2018-01-18 ASSESSMENT — CUP TO DISC RATIO
OD_RATIO: 0.4
OS_RATIO: 0.35

## 2018-01-18 ASSESSMENT — REFRACTION_FINALRX: OS_HPRISM: 5 BO

## 2018-01-18 ASSESSMENT — EXTERNAL EXAM - LEFT EYE: OS_EXAM: NORMAL

## 2018-01-18 ASSESSMENT — SLIT LAMP EXAM - LIDS
COMMENTS: LEVATOR DEHISCENSE
COMMENTS: LEVATOR DEHISCENSE

## 2018-01-18 ASSESSMENT — EXTERNAL EXAM - RIGHT EYE: OD_EXAM: NORMAL

## 2018-01-18 NOTE — LETTER
Ingrid Culver   [id number]    January 18, 2018    Dear Colleague,    Your patient, Ingrid Culver, was seen at the [facility] ophthalmology clinic on January 18, 2018. Her eye exam was stable at this time.     If you require any questions or concerns, please feel free to contact me.      Sincerely,    Porfirio Rodriguez  [facility]  [address] [place] [address]  [phone]

## 2018-01-18 NOTE — PATIENT INSTRUCTIONS
"Glasses prescription given - optional   Use artificial tears up to 4 times per day (Refresh Plus, Systane Balance, or generic artificial tears are ok. Avoid \"get the red out\" drops).     Porfirio Rodriguez MD  [phone]    "

## 2018-01-18 NOTE — MR AVS SNAPSHOT
"              After Visit Summary   1/18/2018    Ingrid Culver    MRN: [id number]           Patient Information     Date Of Birth          9/20/1932        Visit Information        Provider Department      1/18/2018 1:30 PM Porfirio Rodriguez MD [facility]        Today's Diagnoses     Pseudophakia OU    -  1    Esotropia chronic c prism        Posterior vitreous detachment of both eyes        Vitreous syneresis of both eyes        Dry eyes, bilateral        Presbyopia          Care Instructions    Glasses prescription given - optional   Use artificial tears up to 4 times per day (Refresh Plus, Systane Balance, or generic artificial tears are ok. Avoid \"get the red out\" drops).     Porfirio Rodriguez MD  [phone]            Follow-ups after your visit        Follow-up notes from your care team     Return in about 1 year (around 1/18/2019) for Complete Exam.      Your next 10 appointments already scheduled     Jan 23, 2018  1:40 PM CST   US CAROTID BILATERAL with [id number]   [facility] ([facility])    [address] [place] [id number] [phone]           Please bring a list of your medicines (including vitamins, minerals and over-the-counter drugs). Also, tell your doctor about any allergies you may have. Wear comfortable clothes and leave your valuables at home.  You do not need to do anything special to prepare for your exam.  Please call the Imaging Department at your exam site with any questions.              Who to contact     If you have questions or need follow up information about today's clinic visit or your schedule please contact [facility] directly at [phone].  Normal or non-critical lab and imaging results will be communicated to you by MyChart, letter or phone within 4 business days after the clinic has received the results. If you do not hear from us within 7 days, please contact the clinic through MyChart or phone. " If you have a critical or abnormal lab result, we will notify you by phone as soon as possible.  Submit refill requests through [employer] or call your pharmacy and they will forward the refill request to us. Please allow 3 business days for your refill to be completed.          Additional Information About Your Visit        [employer] Information     [employer] gives you secure access to your electronic health record. If you see a primary care provider, you can also send messages to your care team and make appointments. If you have questions, please call your primary care clinic.  If you do not have a primary care provider, please call [phone] and they will assist you.        Care EveryWhere ID     This is your Care EveryWhere ID. This could be used by other organizations to access your [place] medical records  [id number]         Blood Pressure from Last 3 Encounters:   12/26/17 136/71   09/06/17 181/77   06/22/17 162/82    Weight from Last 3 Encounters:   12/26/17 56.2 kg (124 lb)   06/22/17 56.2 kg (124 lb)   02/10/17 55.3 kg (122 lb)              We Performed the Following     EYE EXAM (SIMPLE-NONBILLABLE)     REFRACTIVE STATUS        Primary Care Provider Office Phone # Fax #    Radha Sheets [phone]       [address] [address]        Equal Access to Services     [facility]: Hadii aad ku hadasho Soomaali, waaxda luqadaha, qaybta kaalmada adeegyada, waxbrando cha hayjevon brown . So [facility] [phone].    ATENCIÓN: Si habla español, tiene a kaye disposición servicios gratuitos de asistencia lingüística. Llame al [phone].    We comply with applicable federal civil rights laws and [place] laws. We do not discriminate on the basis of race, color, national origin, age, disability, sex, sexual orientation, or gender identity.            Thank you!     Thank you for choosing [facility]  for your care. Our goal is always to provide you  with excellent care. Hearing back from our patients is one way we can continue to improve our services. Please take a few minutes to complete the written survey that you may receive in the mail after your visit with us. Thank you!             Your Updated Medication List - Protect others around you: Learn how to safely use, store and throw away your medicines at www.disposemymeds.org.          This list is accurate as of: 1/18/18  2:11 PM.  Always use your most recent med list.                   Brand Name Dispense Instructions for use Diagnosis    alendronate 70 MG tablet    FOSAMAX    12 tablet    Take 1 tablet (70 mg) by mouth with 8oz water every 7 days 30 minutes before breakfast and remain upright during this time.    Osteopenia, unspecified location       aspirin 81 MG tablet      Take by mouth daily        azithromycin 250 MG tablet    ZITHROMAX     1 tablet every M,W,F        clopidogrel 75 MG tablet    PLAVIX     Take by mouth daily        COLACE PO           * ROSUVASTATIN CALCIUM PO      Take 10 mg by mouth        * CRESTOR 10 MG tablet   Generic drug:  rosuvastatin     30 tablet    Take 1 tablet (10 mg) by mouth daily    Coronary artery disease involving native coronary artery of native heart, angina presence unspecified, Hyperlipidemia LDL goal <130       EPINEPHrine 0.15 MG/0.3ML injection 2-pack    EPIPEN JR     Inject 0.15 mg into the muscle as needed for anaphylaxis        ipratropium - albuterol 0.5 mg/2.5 mg/3 mL 0.5-2.5 (3) MG/3ML neb solution    DUONEB     Take 1 vial by nebulization every 6 hours as needed for shortness of breath / dyspnea or wheezing        levothyroxine 25 MCG tablet    SYNTHROID/LEVOTHROID    90 tablet    TAKE ONE TABLET BY MOUTH ONE TIME DAILY    Hypothyroidism due to acquired atrophy of thyroid       magnesium 250 MG tablet      Take 1 tablet by mouth daily        * METOPROLOL TARTRATE PO      Take 50 mg by mouth        * metoprolol tartrate 50 MG tablet    LOPRESSOR     60 tablet    Take one (50 mg) each morning, 1/2 (25 mg) at night    Coronary artery disease involving native coronary artery of native heart, angina presence unspecified, Hypertension goal BP (blood pressure) < 150/90       NITROTAB SL      Place 0.4 mg under the tongue        * polyethylene glycol Packet    MIRALAX/GLYCOLAX     Take 1 packet by mouth daily        * polyethylene glycol powder    MIRALAX/GLYCOLAX    510 g    TAKE 17 GRAMS BY MOUTH DAILY. TAKE 8.3 OZ (238 GRAMS) FOR IMAGING PREP AS DIRECTED    Slow transit constipation       psyllium Wafr      Take 2 Wafers by mouth daily    Slow transit constipation       ranitidine 150 MG tablet    ZANTAC    60 tablet    Take 1 tablet (150 mg) by mouth 2 times daily as needed for heartburn    Gastroesophageal reflux disease, esophagitis presence not specified       * TYLENOL PO      Take 500 mg by mouth        * TYLENOL ARTHRITIS EXT RELIEF OR      as needed        Vitamin D (Cholecalciferol) 1000 UNITS Caps      Take by mouth 2 times daily        * Notice:  This list has 8 medication(s) that are the same as other medications prescribed for you. Read the directions carefully, and ask your doctor or other care provider to review them with you.

## 2018-01-18 NOTE — PROGRESS NOTES
" Current Eye Medications   no      Subjective:Comprehensive eye exam.    Pt reports  that her double vision without her glasses on seems to be worse, however her vision remains single with her glasses on. Pt also reports that the vision in her left eye is filmy, more so in the morning, in addition this eye chavez a lot.     Objective:  See Ophthalmology Exam.      Assessment:  Ingrid Culver is a 85 year old female who presents with:     Pseudophakia OU      Esotropia chronic c prism      Posterior vitreous detachment of both eyes      Vitreous syneresis of both eyes      Dry eyes, bilateral Recommend using artificial tears       Plan:  Glasses prescription given - optional   Use artificial tears up to 4 times per day (Refresh Plus, Systane Balance, or generic artificial tears are ok. Avoid \"get the red out\" drops).     Porfirio Rodriguez MD  [phone]           "

## 2018-01-18 NOTE — Clinical Note
1/18/2018         RE: Ingrid Culver  [address] [place] [id number]        Dear Colleague,    Thank you for referring your patient, Ingrid Culver, to the [facility]. Please see a copy of my visit note below.     Current Eye Medications   no      Subjective:Comprehensive eye exam.    Pt reports  that her double vision without her glasses on seems to be worse, however her vision remains single with her glasses on. Pt also reports that the vision in her left eye is filmy, more so in the morning, in addition this eye chavez a lot.     Objective:  See Ophthalmology Exam.       Assessment:      Plan:   See Patient Instructions.         Again, thank you for allowing me to participate in the care of your patient.        Sincerely,        Porfirio Rodriguez MD

## 2018-01-22 ENCOUNTER — TRANSFERRED RECORDS (OUTPATIENT)
Dept: HEALTH INFORMATION MANAGEMENT | Facility: CLINIC | Age: 83
End: 2018-01-22

## 2018-01-23 ENCOUNTER — RADIANT APPOINTMENT (OUTPATIENT)
Dept: ULTRASOUND IMAGING | Facility: CLINIC | Age: 83
End: 2018-01-23
Attending: INTERNAL MEDICINE
Payer: COMMERCIAL

## 2018-01-23 DIAGNOSIS — I73.9 PERIPHERAL ARTERIAL DISEASE (H): ICD-10-CM

## 2018-01-23 DIAGNOSIS — I65.23 ASYMPTOMATIC BILATERAL CAROTID ARTERY STENOSIS: ICD-10-CM

## 2018-01-23 PROCEDURE — 93880 EXTRACRANIAL BILAT STUDY: CPT

## 2018-01-23 NOTE — PROGRESS NOTES
Ingrid Culver    The ultrasound shows very minimal build up of cholesterol on the arteries.  Ongoing medical management (focused on cholesterol and blood pressure) is appropriate.     Sincerely,     JOSE ANGEL MUSA M.D.   Send note

## 2018-01-23 NOTE — LETTER
[facility]  [address] [place]  [address]  [phone]        January 23, 2018    Ingrid Culver  [address] [place] [id number]        Dear Ingrid,    The ultrasound shows very minimal build up of cholesterol on the arteries.  Ongoing medical management (focused on cholesterol and blood pressure) is appropriate.     Results for orders placed or performed in visit on 01/23/18   US Carotid Bilateral    Narrative    BILATERAL CAROTID ULTRASOUND  1/23/2018 2:11 PM     HISTORY: Peripheral arterial disease (H). Asymptomatic bilateral  carotid artery stenosis.    COMPARISON: None.    RIGHT CAROTID FINDINGS: Mild plaque.  Right ICA PSV: 73 cm/sec.  Right ICA EDV: 21 cm/sec.  Right ICA/CCA PSV Ratio: 1.1   These indicate less than 50% diameter stenosis of the right ICA.   Right Vertebral: Antegrade flow.   Right ECA: Antegrade flow.     LEFT CAROTID FINDINGS: Minimal plaque.  Left ICA PSV: 87 cm/sec.  Left ICA EDV: 29 cm/sec.  Left ICA/CCA PSV Ratio: 1.3   These indicate less than 50% diameter stenosis of the left ICA.   Left Vertebral: Antegrade flow.   Left ECA: Antegrade flow.     Causes of Decreased Accuracy: None.       Impression    IMPRESSION:   1. Less than 50% diameter stenosis of the right ICA relative to the  distal ICA diameter.  2. Less than 50% diameter stenosis of the left ICA relative to the  distal ICA diameter.    SUSAN PARISI MD       If you have any questions please call the clinic at [phone].    Sincerely,    Radha PEREZ

## 2018-02-01 DIAGNOSIS — E78.5 DYSLIPIDEMIA: ICD-10-CM

## 2018-02-01 DIAGNOSIS — I10 HYPERTENSION GOAL BP (BLOOD PRESSURE) < 150/90: Primary | ICD-10-CM

## 2018-02-01 DIAGNOSIS — E03.4 HYPOTHYROIDISM DUE TO ACQUIRED ATROPHY OF THYROID: ICD-10-CM

## 2018-02-01 DIAGNOSIS — E78.5 HYPERLIPIDEMIA LDL GOAL <130: ICD-10-CM

## 2018-02-01 PROCEDURE — 80061 LIPID PANEL: CPT

## 2018-02-01 PROCEDURE — 36415 COLL VENOUS BLD VENIPUNCTURE: CPT

## 2018-02-03 LAB
CHOLEST SERPL-MCNC: 174 MG/DL
HDLC SERPL-MCNC: 54 MG/DL
LDLC SERPL CALC-MCNC: 86 MG/DL
NONHDLC SERPL-MCNC: 120 MG/DL
TRIGL SERPL-MCNC: 172 MG/DL

## 2018-02-14 DIAGNOSIS — K59.01 SLOW TRANSIT CONSTIPATION: ICD-10-CM

## 2018-02-14 NOTE — TELEPHONE ENCOUNTER
"Requested Prescriptions   Pending Prescriptions Disp Refills     polyethylene glycol (MIRALAX/GLYCOLAX) powder [Pharmacy Med Name: Polyethylene Glycol 3350 Oral Powder] 510 g 2    Last Written Prescription Date:  8-22-17  Last Fill Quantity: 510g,  # refills: 3   Last office visit: 12/26/2017 with prescribing provider:     Future Office Visit:     Sig: TAKE 17 GRAMS BY MOUTH DAILY. TAKE 8.3 OZ (238 GRAMS) FOR IMAGING PREP AS DIRECTED.    Laxatives Protocol Passed    2/14/2018 12:45 PM       Passed - Patient is age 6 or older       Passed - Recent or future visit with authorizing provider's specialty    Patient had office visit in the last year or has a visit in the next 30 days with authorizing provider.  See \"Patient Info\" tab in inbasket, or \"Choose Columns\" in Meds & Orders section of the refill encounter.               "

## 2018-02-15 RX ORDER — POLYETHYLENE GLYCOL 3350 17 G/17G
POWDER, FOR SOLUTION ORAL
Qty: 510 G | Refills: 2 | Status: SHIPPED | OUTPATIENT
Start: 2018-02-15 | End: 2018-04-19

## 2018-02-15 RX ORDER — POLYETHYLENE GLYCOL 3350 17 G/17G
POWDER, FOR SOLUTION ORAL
Qty: 510 G | Refills: 2 | Status: SHIPPED | OUTPATIENT
Start: 2018-02-15 | End: 2018-02-15

## 2018-02-15 NOTE — TELEPHONE ENCOUNTER
Routing refill request to provider for review/approval because:  Directions state for imaging prep.    Ayana Carmona RN [facility] Triage.

## 2018-03-21 ENCOUNTER — TRANSFERRED RECORDS (OUTPATIENT)
Dept: HEALTH INFORMATION MANAGEMENT | Facility: CLINIC | Age: 83
End: 2018-03-21

## 2018-04-11 ENCOUNTER — TELEPHONE (OUTPATIENT)
Dept: FAMILY MEDICINE | Facility: CLINIC | Age: 83
End: 2018-04-11

## 2018-04-11 DIAGNOSIS — I25.10 CORONARY ARTERY DISEASE INVOLVING NATIVE CORONARY ARTERY OF NATIVE HEART, ANGINA PRESENCE UNSPECIFIED: ICD-10-CM

## 2018-04-11 DIAGNOSIS — I10 HYPERTENSION GOAL BP (BLOOD PRESSURE) < 150/90: ICD-10-CM

## 2018-04-11 RX ORDER — METOPROLOL TARTRATE 50 MG
TABLET ORAL
Qty: 135 TABLET | Refills: 3 | Status: SHIPPED | OUTPATIENT
Start: 2018-04-11 | End: 2019-05-31

## 2018-04-11 NOTE — TELEPHONE ENCOUNTER
Received faxed message from [facility]. [phone] Fax [phone]      Medication: Metoprolol Tar 50 mg tab     Patient states she takes 1 po q am and 1/2 q pm. Please send updated directions.

## 2018-04-11 NOTE — TELEPHONE ENCOUNTER
Indeed, these are the directions on the 12/26/17 historical Rx.    Routed to [facility] to send an actual Rx to pharmacy with these directions.    Olga Wooten RN  [facility]

## 2018-04-13 ENCOUNTER — TELEPHONE (OUTPATIENT)
Dept: FAMILY MEDICINE | Facility: CLINIC | Age: 83
End: 2018-04-13

## 2018-04-13 NOTE — TELEPHONE ENCOUNTER
"Attempt # 1  Called patient at home number.  Was call answered?  Yes, left ankle swelling comes and goes for about the last month, when not on legs the swelling goes down, discoloration on left leg \"veins and stuff in there\"    Ingrid Culver is a 85 year old female who calls with swelling at ankles and about half way up calves.    NURSING ASSESSMENT:    ADMITS: intermittent swelling of ankles - scheduled appointment for Thursday.    DENIES: respiratory difficulty, wheezing or cough, swollen tongue or swelling at back of throat, rapid progression os swelling, coughing up frothy pink-tinged sputum, area warm, red or tender, area cold or blue, vomiting or diarrhea, persistent painful swelling in groin or abdomen, swelling in groin or abdomen and nausea or vomiting, recent trauma, history of kidney disease, persistent water retention, swelling and fever, ankle swelling and increased difficulty breathing at nigh when lying flat, persistent swelling, swelling in groin, swelling in armpit, neck or groin,   Description:  Intermittent swelling of ankles  Onset/duration:  For about the last month  Precip. factors:  Heart issues/up and about  Associated symptoms:  none  Improves/worsens symptoms:  Walking/standing swelling increases/elevating legs decreases the swelling  Pain scale (0-10)   0/10  LMP/preg/breast feeding:  na  Last exam/Treatment:  12/26/2017  Allergies:   Allergies   Allergen Reactions     Zocor [Hmg-Coa-R Inhibitors]        MEDICATIONS:   Taking medication(s) as prescribed? Yes  Taking over the counter medication(s?) No  Any medication side effects? No significant side effects    Any barriers to taking medication(s) as prescribed?  No  Medication(s) improving/managing symptoms?  No  Medication reconciliation completed: Yes      NURSING PLAN: Routed to provider Yes    RECOMMENDED DISPOSITION:  Home care advice - appointment scheduled for Thursday with PCP - reduce salt in the diet, increase exercise and " avoid prolonged sitting or standing, elevate legs at end of the day and whenever possible during the day, avoid restrictive clothing, avoid crossing legs when sitting, avoid wearing rings, sleep on two pillow or raise HOB if having dyspnea. Call 911 for: severe respiratory difficulty, wheezing or cough, swollen tongue or swelling at back of throat, rapid progression os swelling, coughing up frothy pink-tinged sputum. Patient verbalized understanding and agreement with plan.    Will comply with recommendation: Yes  If further questions/concerns or if symptoms do not improve, worsen or new symptoms develop, call your PCP or [facility] as soon as possible.      Guideline used: swelling page 595 to 597  Telephone Triage Protocols for Nurses, Fifth Edition, TYLER Seo RN  [facility]

## 2018-04-13 NOTE — TELEPHONE ENCOUNTER
Reason for call:  Patient reporting a symptom    Symptom or request: swollen left ankle, discoloration, spots    Duration (how long have symptoms been present): 1 month    Have you been treated for this before? No    Additional comments: Patient had a stent placed in December.    Phone Number patient can be reached at:  Home number on file [phone] (home)    Best Time:  any    Can we leave a detailed message on this number:  YES    Call taken on 4/13/2018 at 12:37 PM by Latisha Carter

## 2018-04-17 ENCOUNTER — ALLIED HEALTH/NURSE VISIT (OUTPATIENT)
Dept: NURSING | Facility: CLINIC | Age: 83
End: 2018-04-17
Payer: COMMERCIAL

## 2018-04-17 VITALS — SYSTOLIC BLOOD PRESSURE: 144 MMHG | HEART RATE: 78 BPM | DIASTOLIC BLOOD PRESSURE: 84 MMHG

## 2018-04-17 DIAGNOSIS — S09.90XA HEAD INJURY: Primary | ICD-10-CM

## 2018-04-17 PROCEDURE — 99207 ZZC NO CHARGE NURSE ONLY: CPT

## 2018-04-17 ASSESSMENT — PAIN SCALES - GENERAL: PAINLEVEL: NO PAIN (0)

## 2018-04-17 NOTE — MR AVS SNAPSHOT
After Visit Summary   4/17/2018    Ingrid Culver    MRN: [id number]           Patient Information     Date Of Birth          9/20/1932        Visit Information        Provider Department      4/17/2018 12:30 PM NE [facility]        Today's Diagnoses     Head injury    -  1       Follow-ups after your visit        Your next 10 appointments already scheduled     Apr 19, 2018  7:20 AM CDT   SHORT with Radha Sheets MD   [facility] ([facility])    [address] [place] [id number] [phone]              Who to contact     If you have questions or need follow up information about today's clinic visit or your schedule please contact [facility] directly at [phone].  Normal or non-critical lab and imaging results will be communicated to you by MyChart, letter or phone within 4 business days after the clinic has received the results. If you do not hear from us within 7 days, please contact the clinic through MyChart or phone. If you have a critical or abnormal lab result, we will notify you by phone as soon as possible.  Submit refill requests through [employer] or call your pharmacy and they will forward the refill request to us. Please allow 3 business days for your refill to be completed.          Additional Information About Your Visit        MyChart Information     [employer] gives you secure access to your electronic health record. If you see a primary care provider, you can also send messages to your care team and make appointments. If you have questions, please call your primary care clinic.  If you do not have a primary care provider, please call [phone] and they will assist you.        Care EveryWhere ID     This is your Care EveryWhere ID. This could be used by other organizations to access your [place] medical records  [id number]        Your Vitals Were      Pulse                   78            Blood Pressure from Last 3 Encounters:   04/17/18 144/84   12/26/17 136/71   09/06/17 181/77    Weight from Last 3 Encounters:   12/26/17 124 lb (56.2 kg)   06/22/17 124 lb (56.2 kg)   02/10/17 122 lb (55.3 kg)              Today, you had the following     No orders found for display       Primary Care Provider Office Phone # Fax #    Radha Sheets [phone]       [address] [facility] [address]        Equal Access to Services     [facility]: Hadii aad ku hadasho Soomaali, waaxda luqadaha, qaybta kaalmada aderikayada, sofya brown . So [facility] [phone].    ATENCIÓN: Si habla español, tiene a kaye disposición servicios gratuitos de asistencia lingüística. Llame al [phone].    We comply with applicable federal civil rights laws and [place] laws. We do not discriminate on the basis of race, color, national origin, age, disability, sex, sexual orientation, or gender identity.            Thank you!     Thank you for choosing [facility]  for your care. Our goal is always to provide you with excellent care. Hearing back from our patients is one way we can continue to improve our services. Please take a few minutes to complete the written survey that you may receive in the mail after your visit with us. Thank you!             Your Updated Medication List - Protect others around you: Learn how to safely use, store and throw away your medicines at www.disposemymeds.org.          This list is accurate as of 4/17/18 12:49 PM.  Always use your most recent med list.                   Brand Name Dispense Instructions for use Diagnosis    alendronate 70 MG tablet    FOSAMAX    12 tablet    Take 1 tablet (70 mg) by mouth with 8oz water every 7 days 30 minutes before breakfast and remain upright during this time.    Osteopenia, unspecified location       aspirin 81 MG tablet      Take by mouth daily         azithromycin 250 MG tablet    ZITHROMAX     1 tablet every M,W,F        clopidogrel 75 MG tablet    PLAVIX     Take by mouth daily        COLACE PO           * ROSUVASTATIN CALCIUM PO      Take 10 mg by mouth        * CRESTOR 10 MG tablet   Generic drug:  rosuvastatin     30 tablet    Take 1 tablet (10 mg) by mouth daily    Coronary artery disease involving native coronary artery of native heart, angina presence unspecified, Hyperlipidemia LDL goal <130       EPINEPHrine 0.15 MG/0.3ML injection 2-pack    EPIPEN JR     Inject 0.15 mg into the muscle as needed for anaphylaxis        ipratropium - albuterol 0.5 mg/2.5 mg/3 mL 0.5-2.5 (3) MG/3ML neb solution    DUONEB     Take 1 vial by nebulization every 6 hours as needed for shortness of breath / dyspnea or wheezing        levothyroxine 25 MCG tablet    SYNTHROID/LEVOTHROID    90 tablet    TAKE ONE TABLET BY MOUTH ONE TIME DAILY    Hypothyroidism due to acquired atrophy of thyroid       magnesium 250 MG tablet      Take 1 tablet by mouth daily        metoprolol tartrate 50 MG tablet    LOPRESSOR    135 tablet    Take one tablet (50 mg) each morning, and take 1/2 tablet (25 mg) at night    Coronary artery disease involving native coronary artery of native heart, angina presence unspecified, Hypertension goal BP (blood pressure) < 150/90       NITROTAB SL      Place 0.4 mg under the tongue        * polyethylene glycol Packet    MIRALAX/GLYCOLAX     Take 1 packet by mouth daily        * polyethylene glycol powder    MIRALAX/GLYCOLAX    510 g    TAKE 17 GRAMS BY MOUTH DAILY as needed for constipation.    Slow transit constipation       psyllium Wafr      Take 2 Wafers by mouth daily    Slow transit constipation       ranitidine 150 MG tablet    ZANTAC    60 tablet    Take 1 tablet (150 mg) by mouth 2 times daily as needed for heartburn    Gastroesophageal reflux disease, esophagitis presence not specified       * TYLENOL PO      Take 500 mg by mouth        * TYLENOL  ARTHRITIS EXT RELIEF OR      as needed        Vitamin D (Cholecalciferol) 1000 units Caps      Take by mouth 2 times daily        * Notice:  This list has 6 medication(s) that are the same as other medications prescribed for you. Read the directions carefully, and ask your doctor or other care provider to review them with you.

## 2018-04-17 NOTE — NURSING NOTE
Patient presents to clinic with her son Nando.  Patient had just hit her head on the corner of the cupboard at home and is bleeding.  She is holding a paper towel saturated with blood.  She has a gash to her L upper forehead, just above the hairline.  She denies any dizziness, nausea, headache or pain.  She did not fall.  She does take Plavix.  Dr. Warren assessed the wound and recommended ED.  They decline ambulance, so Nando will bring patient to [facility].  Writer sent 2 ABD pads with them and instructed on keeping constant pressure to wound to slow bleeding.  They voice understanding.   Hetal Lopez RN

## 2018-04-19 ENCOUNTER — OFFICE VISIT (OUTPATIENT)
Dept: FAMILY MEDICINE | Facility: CLINIC | Age: 83
End: 2018-04-19
Payer: COMMERCIAL

## 2018-04-19 VITALS
BODY MASS INDEX: 25.13 KG/M2 | TEMPERATURE: 97.4 F | DIASTOLIC BLOOD PRESSURE: 78 MMHG | HEIGHT: 60 IN | WEIGHT: 128 LBS | SYSTOLIC BLOOD PRESSURE: 134 MMHG | OXYGEN SATURATION: 97 % | HEART RATE: 61 BPM

## 2018-04-19 DIAGNOSIS — E03.4 HYPOTHYROIDISM DUE TO ACQUIRED ATROPHY OF THYROID: ICD-10-CM

## 2018-04-19 DIAGNOSIS — R60.9 DEPENDENT EDEMA: ICD-10-CM

## 2018-04-19 DIAGNOSIS — I10 HYPERTENSION GOAL BP (BLOOD PRESSURE) < 150/90: ICD-10-CM

## 2018-04-19 DIAGNOSIS — E78.5 HYPERLIPIDEMIA LDL GOAL <130: ICD-10-CM

## 2018-04-19 DIAGNOSIS — I25.10 CORONARY ARTERY DISEASE INVOLVING NATIVE CORONARY ARTERY OF NATIVE HEART, ANGINA PRESENCE UNSPECIFIED: ICD-10-CM

## 2018-04-19 DIAGNOSIS — S09.90XD CLOSED HEAD INJURY, SUBSEQUENT ENCOUNTER: ICD-10-CM

## 2018-04-19 DIAGNOSIS — J47.9 BRONCHIECTASIS WITHOUT COMPLICATION (H): ICD-10-CM

## 2018-04-19 DIAGNOSIS — B02.9 HERPES ZOSTER WITHOUT COMPLICATION: Primary | ICD-10-CM

## 2018-04-19 PROCEDURE — 99214 OFFICE O/P EST MOD 30 MIN: CPT | Performed by: INTERNAL MEDICINE

## 2018-04-19 RX ORDER — ACYCLOVIR 800 MG/1
800 TABLET ORAL
Qty: 35 TABLET | Refills: 0 | Status: SHIPPED | OUTPATIENT
Start: 2018-04-19 | End: 2022-05-13

## 2018-04-19 NOTE — MR AVS SNAPSHOT
After Visit Summary   4/19/2018    Ingrid Culver    MRN: [id number]           Patient Information     Date Of Birth          9/20/1932        Visit Information        Provider Department      4/19/2018 7:20 AM Radha Sheets MD [facility]        Today's Diagnoses     Hyperlipidemia LDL goal <130    -  1    Herpes zoster without complication        Closed head injury, subsequent encounter        Bronchiectasis without complication (H)        Hypertension goal BP (blood pressure) < 150/90        Dependent edema        Hypothyroidism due to acquired atrophy of thyroid        Coronary artery disease involving native coronary artery of native heart, angina presence unspecified          Care Instructions    Legs:  Elevation and compression     Acyclovir 800 mg 5X daily for a week    Shingrix vaccine (Part D) via clinic or pharmacy    See RN for stitches removal mid week next week    Return to clinic after 12/2018 for your routine physical              Follow-ups after your visit        Follow-up notes from your care team     Return in about 3 months (around 7/19/2018).      Who to contact     If you have questions or need follow up information about today's clinic visit or your schedule please contact [facility] directly at [phone].  Normal or non-critical lab and imaging results will be communicated to you by MyChart, letter or phone within 4 business days after the clinic has received the results. If you do not hear from us within 7 days, please contact the clinic through [employer] or phone. If you have a critical or abnormal lab result, we will notify you by phone as soon as possible.  Submit refill requests through [employer] or call your pharmacy and they will forward the refill request to us. Please allow 3 business days for your refill to be completed.          Additional Information About Your Visit        MyChart Information     [employer] gives  "you secure access to your electronic health record. If you see a primary care provider, you can also send messages to your care team and make appointments. If you have questions, please call your primary care clinic.  If you do not have a primary care provider, please call [phone] and they will assist you.        Care EveryWhere ID     This is your Care EveryWhere ID. This could be used by other organizations to access your [place] medical records  [id number]        Your Vitals Were     Pulse Temperature Height Pulse Oximetry BMI (Body Mass Index)       61 97.4  F (36.3  C) (Oral) 5' 0.25\" (1.53 m) 97% 24.79 kg/m2        Blood Pressure from Last 3 Encounters:   04/19/18 134/78   04/17/18 144/84   12/26/17 136/71    Weight from Last 3 Encounters:   04/19/18 128 lb (58.1 kg)   12/26/17 124 lb (56.2 kg)   06/22/17 124 lb (56.2 kg)              Today, you had the following     No orders found for display         Today's Medication Changes          These changes are accurate as of 4/19/18  8:21 AM.  If you have any questions, ask your nurse or doctor.               Start taking these medicines.        Dose/Directions    acyclovir 800 MG tablet   Commonly known as:  ZOVIRAX   Used for:  Herpes zoster without complication   Started by:  Radha Sheets MD        Dose:  800 mg   Take 1 tablet (800 mg) by mouth 5 times daily   Quantity:  35 tablet   Refills:  0            Where to get your medicines      These medications were sent to [id number] [address]  [address] [facility] [address]     Phone:  [phone]     acyclovir 800 MG tablet                Primary Care Provider Office Phone # Fax #    Radha Sheets [phone] [address] [facility] [address]        Equal Access to Services     JAX LANGFORD AH: Marvin Mohamud, waaxda luqadaha, qaybta irmaalsofya gamboa " la'jevon shaikh. So [facility] [phone].    ATENCIÓN: Si habla cedrick, tiene a kaye disposición servicios gratuitos de asistencia lingüística. Zunilda oliva [phone].    We comply with applicable federal civil rights laws and [place] laws. We do not discriminate on the basis of race, color, national origin, age, disability, sex, sexual orientation, or gender identity.            Thank you!     Thank you for choosing [facility]  for your care. Our goal is always to provide you with excellent care. Hearing back from our patients is one way we can continue to improve our services. Please take a few minutes to complete the written survey that you may receive in the mail after your visit with us. Thank you!             Your Updated Medication List - Protect others around you: Learn how to safely use, store and throw away your medicines at www.disposemymeds.org.          This list is accurate as of 4/19/18  8:21 AM.  Always use your most recent med list.                   Brand Name Dispense Instructions for use Diagnosis    acyclovir 800 MG tablet    ZOVIRAX    35 tablet    Take 1 tablet (800 mg) by mouth 5 times daily    Herpes zoster without complication       aspirin 81 MG tablet      Take by mouth daily        azithromycin 250 MG tablet    ZITHROMAX     1 tablet every M,W,F        clopidogrel 75 MG tablet    PLAVIX     Take by mouth daily        COLACE PO           CRESTOR 10 MG tablet   Generic drug:  rosuvastatin     30 tablet    Take 1 tablet (10 mg) by mouth daily    Coronary artery disease involving native coronary artery of native heart, angina presence unspecified, Hyperlipidemia LDL goal <130       ipratropium - albuterol 0.5 mg/2.5 mg/3 mL 0.5-2.5 (3) MG/3ML neb solution    DUONEB     Take 1 vial by nebulization every 6 hours as needed for shortness of breath / dyspnea or wheezing        levothyroxine 25 MCG tablet    SYNTHROID/LEVOTHROID    90 tablet    TAKE ONE TABLET BY MOUTH ONE TIME DAILY     Hypothyroidism due to acquired atrophy of thyroid       magnesium 250 MG tablet      Take 1 tablet by mouth daily        metoprolol tartrate 50 MG tablet    LOPRESSOR    135 tablet    Take one tablet (50 mg) each morning, and take 1/2 tablet (25 mg) at night    Coronary artery disease involving native coronary artery of native heart, angina presence unspecified, Hypertension goal BP (blood pressure) < 150/90       NITROTAB SL      Place 0.4 mg under the tongue        polyethylene glycol Packet    MIRALAX/GLYCOLAX     Take 1 packet by mouth daily        psyllium Wafr      Take 2 Wafers by mouth daily    Slow transit constipation       ranitidine 150 MG tablet    ZANTAC    60 tablet    Take 1 tablet (150 mg) by mouth 2 times daily as needed for heartburn    Gastroesophageal reflux disease, esophagitis presence not specified       * TYLENOL PO      Take 500 mg by mouth        * TYLENOL ARTHRITIS EXT RELIEF OR      as needed        Vitamin D (Cholecalciferol) 1000 units Caps      Take by mouth 2 times daily        * Notice:  This list has 2 medication(s) that are the same as other medications prescribed for you. Read the directions carefully, and ask your doctor or other care provider to review them with you.

## 2018-04-19 NOTE — PATIENT INSTRUCTIONS
Legs:  Elevation and compression     Acyclovir 800 mg 5X daily for a week    Shingrix vaccine (Part D) via clinic or pharmacy    See RN for stitches removal mid week next week    Return to clinic after 12/2018 for your routine physical

## 2018-04-19 NOTE — PROGRESS NOTES
SUBJECTIVE:   Ingrid Culver is a 85 year old female who presents to clinic today for the following health issues:     84 y/o F with h/o Bronchiectasis, HTN, Hypothyroid, CAD.  She made this appoinmtent due to some intermittent ankle edema.  BP has been normal  ... she has normal kidney function.    ED/UC Followup:    Facility:  [facility]  Date of visit: 4/17/18  Reason for visit: scalp laceration   Current Status:      Emergency department for evaluation of head injury. Patient states she turned around and ran into the corner of the cupboard, impacting her forehead.  She denies any loss of consciousness. She endorses blurry/ double vision, but states this is baseline and remarks that it is not any worse than normal. Also denies nausea and vomiting. Her last tetanus shot was in 2009. No further complaints or concerns are voiced at this time.    Skin closed with 4-0 Ethilon using interrupted sutures.    Tetanus given.          Leg swelling and spots on left leg .   At end of day.   Resolves by morning.       Problem list and histories reviewed & adjusted, as indicated.  Additional history: as documented    Patient Active Problem List   Diagnosis     SHOULDER IMPINGEMENT - left     Hyperlipidemia LDL goal <130     Advanced directives, counseling/discussion     Insomnia     Trigger finger, acquired - right ring     Trigger thumb - right     Bronchiectasis (H)     Cerebral infarction (H)     Vitreous syneresis     PVD (posterior vitreous detachment)     Pseudophakia OU     Esotropia chronic c prism     Dry eyes, bilateral     Other idiopathic scoliosis, thoracolumbar region     Ganglion cyst of finger of right hand     Hypertension goal BP (blood pressure) < 150/90     Hypothyroidism due to acquired atrophy of thyroid     Coronary artery disease involving native coronary artery of native heart, angina presence unspecified     Past Surgical History:   Procedure Laterality Date     APPENDECTOMY       BREAST BIOPSY,  RT/LT      Breat Biopsy RT/LT     C TOTAL KNEE ARTHROPLASTY  06/25/03    left poly exchange     CARDIAC SURGERY  12/03/2017    STINT     COLONOSCOPY  06/00   And 2008    diverticulosis     COLONOSCOPY  1-13-12     ENDOSCOPY  1-13-12     EXTRACAPSULAR CATARACT EXTRATION WITH INTRAOCULAR LENS IMPLANT  01/2000    both eyes     FLEXIBLE SIGMOIDOSCOPY  06/95,08/98     JOINT REPLACEMTN, KNEE RT/LT  92    right       Social History   Substance Use Topics     Smoking status: Former Smoker     Types: Cigarettes     Quit date: 11/9/1974     Smokeless tobacco: Never Used     Alcohol use Yes      Comment: occasional glass of wine     Family History   Problem Relation Age of Onset     Breast Cancer Mother      Arthritis Mother      Thyroid Disease Mother      Cardiovascular Father      Asthma Maternal Grandmother      CANCER Brother      CANCER Son      tonsil and tongue, bladder     Alcohol/Drug Son      HEART DISEASE Daughter      Glaucoma No family hx of      Macular Degeneration No family hx of          Current Outpatient Prescriptions   Medication Sig Dispense Refill     Acetaminophen (TYLENOL PO) Take 500 mg by mouth       acyclovir (ZOVIRAX) 800 MG tablet Take 1 tablet (800 mg) by mouth 5 times daily 35 tablet 0     aspirin 81 MG tablet Take by mouth daily       azithromycin (ZITHROMAX) 250 MG tablet 1 tablet every M,W,F       clopidogrel (PLAVIX) 75 MG tablet Take by mouth daily       Docusate Sodium (COLACE PO)        ipratropium - albuterol 0.5 mg/2.5 mg/3 mL (DUONEB) 0.5-2.5 (3) MG/3ML neb solution Take 1 vial by nebulization every 6 hours as needed for shortness of breath / dyspnea or wheezing       levothyroxine (SYNTHROID/LEVOTHROID) 25 MCG tablet TAKE ONE TABLET BY MOUTH ONE TIME DAILY  90 tablet 1     magnesium 250 MG tablet Take 1 tablet by mouth daily       metoprolol tartrate (LOPRESSOR) 50 MG tablet Take one tablet (50 mg) each morning, and take 1/2 tablet (25 mg) at night 135 tablet 3     Nitroglycerin  (NITROTAB SL) Place 0.4 mg under the tongue       polyethylene glycol (MIRALAX/GLYCOLAX) Packet Take 1 packet by mouth daily       psyllium (METAMUCIL) WAFR Take 2 Wafers by mouth daily       ranitidine (ZANTAC) 150 MG tablet Take 1 tablet (150 mg) by mouth 2 times daily as needed for heartburn 60 tablet 1     rosuvastatin (CRESTOR) 10 MG tablet Take 1 tablet (10 mg) by mouth daily 30 tablet      TYLENOL ARTHRITIS EXT RELIEF OR as needed       Vitamin D, Cholecalciferol, 1000 UNITS CAPS Take by mouth 2 times daily       [DISCONTINUED] ROSUVASTATIN CALCIUM PO Take 10 mg by mouth       Allergies   Allergen Reactions     Zocor [Hmg-Coa-R Inhibitors]      Recent Labs   Lab Test  02/01/18   0927  12/26/17   1025  06/22/17   0753  12/19/16   1037   12/16/15   1051  12/11/14   0945   LDL  86   --    --   86   --   91  70   HDL  54   --    --   68   --   63  55   TRIG  172*   --    --   152*   --   156*  138   ALT   --    --   22   --    --   32  21   CR   --   0.73   --   0.71   < >  0.83  0.78   GFRESTIMATED   --   75   --   79   --   65  70   GFRESTBLACK   --   >90   --   >90   GFR Calc     --   79  85   POTASSIUM   --   4.5   --   4.2   < >  4.9  4.0   TSH   --   2.59   --   2.26   --   2.04  1.63    < > = values in this interval not displayed.      BP Readings from Last 3 Encounters:   04/19/18 134/78   04/17/18 144/84   12/26/17 136/71    Wt Readings from Last 3 Encounters:   04/19/18 128 lb (58.1 kg)   12/26/17 124 lb (56.2 kg)   06/22/17 124 lb (56.2 kg)                  Labs reviewed in EPIC    Reviewed and updated as needed this visit by clinical staff  Tobacco  Allergies  Meds  Med Hx  Surg Hx  Fam Hx  Soc Hx      Reviewed and updated as needed this visit by Provider  Allergies  Meds  Problems         ROS:  Constitutional, HEENT, cardiovascular, pulmonary, gi and gu systems are negative, except as otherwise noted.    OBJECTIVE:     /78 (BP Location: Left arm, Patient Position:  "Sitting, Cuff Size: Adult Small)  Pulse 61  Temp 97.4  F (36.3  C) (Oral)  Ht 5' 0.25\" (1.53 m)  Wt 128 lb (58.1 kg)  SpO2 97%  BMI 24.79 kg/m2  Body mass index is 24.79 kg/(m^2).  GENERAL: healthy, alert and no distress  EYES: Eyes grossly normal to inspection, PERRL and conjunctivae and sclerae normal  HENT: normal cephalic/atraumatic and oral mucous membranes moist.  Small laceration with 2 stitches at  frontal hairline, just to left of midline.    RESP: lungs clear to auscultation - no rales, rhonchi or wheezes  CV: regular rate and rhythm, normal S1 S2, no S3 or S4, no murmur, click or rub, no peripheral edema and peripheral pulses strong  MS: no gross musculoskeletal defects noted, no edema  PSYCH: mentation appears normal, affect normal/bright    Diagnostic Test Results:  Results for orders placed or performed in visit on 02/01/18   Lipid panel reflex to direct LDL Fasting   Result Value Ref Range    Cholesterol 174 <200 mg/dL    Triglycerides 172 (H) <150 mg/dL    HDL Cholesterol 54 >49 mg/dL    LDL Cholesterol Calculated 86 <100 mg/dL    Non HDL Cholesterol 120 <130 mg/dL       ASSESSMENT/PLAN:           ICD-10-CM    1. Herpes zoster without complication B02.9 acyclovir (ZOVIRAX) 800 MG tablet   2. Closed head injury, subsequent encounter S09.90XD    3. Bronchiectasis without complication (H) J47.9    4. Hypertension goal BP (blood pressure) < 150/90 I10    5. Dependent edema R60.9    6. Hypothyroidism due to acquired atrophy of thyroid E03.4    7. Coronary artery disease involving native coronary artery of native heart, angina presence unspecified I25.10    8. Hyperlipidemia LDL goal <130 E78.5           Patient Instructions   Legs:  Elevation and compression likely dependent edema.     Acyclovir 800 mg 5X daily for a week  shingles    Shingrix vaccine (Part D) via clinic or pharmacy    See RN for stitches removal mid week next week    Return to clinic after 12/2018 for your routine physical     "     Radha Sheets MD  [facility]

## 2018-04-27 ENCOUNTER — ALLIED HEALTH/NURSE VISIT (OUTPATIENT)
Dept: NURSING | Facility: CLINIC | Age: 83
End: 2018-04-27
Payer: COMMERCIAL

## 2018-04-27 DIAGNOSIS — Z48.02 ENCOUNTER FOR REMOVAL OF SUTURES: Primary | ICD-10-CM

## 2018-04-27 PROCEDURE — 99207 ZZC NO CHARGE NURSE ONLY: CPT

## 2018-04-27 NOTE — PROGRESS NOTES
Ingrid presents to the clinic today for  removal of sutures.  The patient has had the sutures in place for 10 days.    There has been no history of infection or drainage.    O: 2 sutures are seen located on the forward top scalp.  The wound is healing well with no signs of infection.    Tetanus status is up to date.  Notes from [facility] visit copied into PCP visit not on 4/19 indicated tetanus updated 4/17/18.   I updated Epic with historical entry    A: Suture removal.    P:  All sutures were easily removed today.  Routine wound care discussed.  The patient will follow up as needed.    Olga Wooten RN  [facility]

## 2018-04-27 NOTE — MR AVS SNAPSHOT
After Visit Summary   4/27/2018    Ingrid Culver    MRN: [id number]           Patient Information     Date Of Birth          9/20/1932        Visit Information        Provider Department      4/27/2018 9:00 AM CP RN [facility]        Today's Diagnoses     Encounter for removal of sutures    -  1       Follow-ups after your visit        Who to contact     If you have questions or need follow up information about today's clinic visit or your schedule please contact [facility] directly at [phone].  Normal or non-critical lab and imaging results will be communicated to you by [employer], letter or phone within 4 business days after the clinic has received the results. If you do not hear from us within 7 days, please contact the clinic through [employer] or phone. If you have a critical or abnormal lab result, we will notify you by phone as soon as possible.  Submit refill requests through [employer] or call your pharmacy and they will forward the refill request to us. Please allow 3 business days for your refill to be completed.          Additional Information About Your Visit        MyChart Information     [employer] gives you secure access to your electronic health record. If you see a primary care provider, you can also send messages to your care team and make appointments. If you have questions, please call your primary care clinic.  If you do not have a primary care provider, please call [phone] and they will assist you.        Care EveryWhere ID     This is your Care EveryWhere ID. This could be used by other organizations to access your [place] medical records  [id number]         Blood Pressure from Last 3 Encounters:   04/19/18 134/78   04/17/18 144/84   12/26/17 136/71    Weight from Last 3 Encounters:   04/19/18 128 lb (58.1 kg)   12/26/17 124 lb (56.2 kg)   06/22/17 124 lb (56.2 kg)              Today, you had the following     No orders found  for display       Primary Care Provider Office Phone # Fax #    Radha Sheets [phone]       [address] [facility] [address]        Equal Access to Services     JAX LANGFORD : Hadii aad ku hadvicenteo Cade, wadruda luqadaha, qaybta kaalmada stacey, sofya lau laCalvinjevon shaikh. So [facility] [phone].    ATENCIÓN: Si habla español, tiene a kaye disposición servicios gratuitos de asistencia lingüística. Llame al [phone].    We comply with applicable federal civil rights laws and [place] laws. We do not discriminate on the basis of race, color, national origin, age, disability, sex, sexual orientation, or gender identity.            Thank you!     Thank you for choosing [facility]  for your care. Our goal is always to provide you with excellent care. Hearing back from our patients is one way we can continue to improve our services. Please take a few minutes to complete the written survey that you may receive in the mail after your visit with us. Thank you!             Your Updated Medication List - Protect others around you: Learn how to safely use, store and throw away your medicines at www.disposemymeds.org.          This list is accurate as of 4/27/18  9:12 AM.  Always use your most recent med list.                   Brand Name Dispense Instructions for use Diagnosis    acyclovir 800 MG tablet    ZOVIRAX    35 tablet    Take 1 tablet (800 mg) by mouth 5 times daily    Herpes zoster without complication       aspirin 81 MG tablet      Take by mouth daily        azithromycin 250 MG tablet    ZITHROMAX     1 tablet every M,W,F        clopidogrel 75 MG tablet    PLAVIX     Take by mouth daily        COLACE PO           CRESTOR 10 MG tablet   Generic drug:  rosuvastatin     30 tablet    Take 1 tablet (10 mg) by mouth daily    Coronary artery disease involving native coronary artery of native heart, angina presence unspecified,  Hyperlipidemia LDL goal <130       ipratropium - albuterol 0.5 mg/2.5 mg/3 mL 0.5-2.5 (3) MG/3ML neb solution    DUONEB     Take 1 vial by nebulization every 6 hours as needed for shortness of breath / dyspnea or wheezing        levothyroxine 25 MCG tablet    SYNTHROID/LEVOTHROID    90 tablet    TAKE ONE TABLET BY MOUTH ONE TIME DAILY    Hypothyroidism due to acquired atrophy of thyroid       magnesium 250 MG tablet      Take 1 tablet by mouth daily        metoprolol tartrate 50 MG tablet    LOPRESSOR    135 tablet    Take one tablet (50 mg) each morning, and take 1/2 tablet (25 mg) at night    Coronary artery disease involving native coronary artery of native heart, angina presence unspecified, Hypertension goal BP (blood pressure) < 150/90       NITROTAB SL      Place 0.4 mg under the tongue        polyethylene glycol Packet    MIRALAX/GLYCOLAX     Take 1 packet by mouth daily        psyllium Wafr      Take 2 Wafers by mouth daily    Slow transit constipation       ranitidine 150 MG tablet    ZANTAC    60 tablet    Take 1 tablet (150 mg) by mouth 2 times daily as needed for heartburn    Gastroesophageal reflux disease, esophagitis presence not specified       * TYLENOL PO      Take 500 mg by mouth        * TYLENOL ARTHRITIS EXT RELIEF OR      as needed        Vitamin D (Cholecalciferol) 1000 units Caps      Take by mouth 2 times daily        * Notice:  This list has 2 medication(s) that are the same as other medications prescribed for you. Read the directions carefully, and ask your doctor or other care provider to review them with you.

## 2018-05-16 ENCOUNTER — TRANSFERRED RECORDS (OUTPATIENT)
Dept: HEALTH INFORMATION MANAGEMENT | Facility: CLINIC | Age: 83
End: 2018-05-16

## 2018-07-03 DIAGNOSIS — E03.4 HYPOTHYROIDISM DUE TO ACQUIRED ATROPHY OF THYROID: ICD-10-CM

## 2018-07-03 NOTE — TELEPHONE ENCOUNTER
"Requested Prescriptions   Pending Prescriptions Disp Refills     levothyroxine (SYNTHROID/LEVOTHROID) 25 MCG tablet [Pharmacy Med Name: Levothyroxine Sodium Oral Tablet 25 MCG] 90 tablet 0    Last Written Prescription Date:  1/3/18  Last Fill Quantity: 90,  # refills: 1   Last office visit: 4/19/2018 with prescribing provider:     Future Office Visit:     Sig: TAKE ONE TABLET BY MOUTH ONE TIME DAILY    Thyroid Protocol Passed    7/3/2018  7:00 AM       Passed - Patient is 12 years or older       Passed - Recent (12 mo) or future (30 days) visit within the authorizing provider's specialty    Patient had office visit in the last 12 months or has a visit in the next 30 days with authorizing provider or within the authorizing provider's specialty.  See \"Patient Info\" tab in inbasket, or \"Choose Columns\" in Meds & Orders section of the refill encounter.           Passed - Normal TSH on file in past 12 months    Recent Labs   Lab Test  12/26/17   1025   TSH  2.59             Passed - No active pregnancy on record    If patient is pregnant or has had a positive pregnancy test, please check TSH.         Passed - No positive pregnancy test in past 12 months    If patient is pregnant or has had a positive pregnancy test, please check TSH.            "

## 2018-07-05 RX ORDER — LEVOTHYROXINE SODIUM 25 UG/1
TABLET ORAL
Qty: 90 TABLET | Refills: 1 | Status: SHIPPED | OUTPATIENT
Start: 2018-07-05 | End: 2019-01-03

## 2018-07-12 DIAGNOSIS — K21.9 GASTROESOPHAGEAL REFLUX DISEASE, ESOPHAGITIS PRESENCE NOT SPECIFIED: ICD-10-CM

## 2018-07-13 NOTE — TELEPHONE ENCOUNTER
"Requested Prescriptions   Pending Prescriptions Disp Refills     ranitidine (ZANTAC) 150 MG tablet [Pharmacy Med Name: RaNITidine HCl Oral Tablet 150 MG] 60 tablet 0     Sig: Take 1 tablet (150 mg) by mouth 2 times daily as needed for heartburn    H2 Blockers Protocol Passed    7/12/2018  8:04 PM       Passed - Patient is age 12 or older       Passed - Recent (12 mo) or future (30 days) visit within the authorizing provider's specialty    Patient had office visit in the last 12 months or has a visit in the next 30 days with authorizing provider or within the authorizing provider's specialty.  See \"Patient Info\" tab in inbasket, or \"Choose Columns\" in Meds & Orders section of the refill encounter.            Last Written Prescription Date:  12/26/17  Last Fill Quantity: 60,  # refills: 1   Last office visit: 4/19/2018 with prescribing provider:  LENCHO   Future Office Visit:      "

## 2018-11-05 DIAGNOSIS — K21.9 GASTROESOPHAGEAL REFLUX DISEASE, ESOPHAGITIS PRESENCE NOT SPECIFIED: ICD-10-CM

## 2018-11-05 NOTE — TELEPHONE ENCOUNTER
"Requested Prescriptions   Pending Prescriptions Disp Refills     ranitidine (ZANTAC) 150 MG tablet [Pharmacy Med Name: RaNITidine HCl Oral Tablet 150 MG] 60 tablet 2    Last Written Prescription Date:  7-13-18  Last Fill Quantity: 60,  # refills: 3   Last office visit: 4/19/2018 with prescribing provider:  4-19-18   Future Office Visit:     Sig: TAKE ONE TABLET BY MOUTH TWICE DAILY AS NEEDED FOR HEARTBURN    H2 Blockers Protocol Passed    11/5/2018  7:00 AM       Passed - Patient is age 12 or older       Passed - Recent (12 mo) or future (30 days) visit within the authorizing provider's specialty    Patient had office visit in the last 12 months or has a visit in the next 30 days with authorizing provider or within the authorizing provider's specialty.  See \"Patient Info\" tab in inbasket, or \"Choose Columns\" in Meds & Orders section of the refill encounter.                "

## 2018-12-19 ENCOUNTER — TRANSFERRED RECORDS (OUTPATIENT)
Dept: HEALTH INFORMATION MANAGEMENT | Facility: CLINIC | Age: 83
End: 2018-12-19

## 2019-01-03 DIAGNOSIS — E03.4 HYPOTHYROIDISM DUE TO ACQUIRED ATROPHY OF THYROID: ICD-10-CM

## 2019-01-03 NOTE — TELEPHONE ENCOUNTER
"Requested Prescriptions   Pending Prescriptions Disp Refills     levothyroxine (SYNTHROID/LEVOTHROID) 25 MCG tablet [Pharmacy Med Name: Levothyroxine Sodium Oral Tablet 25 MCG] 90 tablet 0    Last Written Prescription Date:  7/5/18  Last Fill Quantity: 90,  # refills: 1   Last office visit: 4/19/2018 with prescribing provider:  Sudeep   Future Office Visit:     Sig: TAKE ONE TABLET BY MOUTH ONE TIME DAILY    Thyroid Protocol Failed - 1/3/2019 10:58 AM       Failed - Normal TSH on file in past 12 months    Recent Labs   Lab Test 12/26/17  1025   TSH 2.59             Passed - Patient is 12 years or older       Passed - Recent (12 mo) or future (30 days) visit within the authorizing provider's specialty    Patient had office visit in the last 12 months or has a visit in the next 30 days with authorizing provider or within the authorizing provider's specialty.  See \"Patient Info\" tab in inbasket, or \"Choose Columns\" in Meds & Orders section of the refill encounter.             Passed - No active pregnancy on record    If patient is pregnant or has had a positive pregnancy test, please check TSH.         Passed - No positive pregnancy test in past 12 months    If patient is pregnant or has had a positive pregnancy test, please check TSH.            "

## 2019-01-07 RX ORDER — LEVOTHYROXINE SODIUM 25 UG/1
TABLET ORAL
Qty: 90 TABLET | Refills: 0 | Status: SHIPPED | OUTPATIENT
Start: 2019-01-07 | End: 2019-04-05

## 2019-01-07 NOTE — TELEPHONE ENCOUNTER
TC contacted patient and communicated message below. She is going to think about if she wants to schedule an appointment with Мария KERR CNP or if she wants to wait until Dr Sheets returns. She will call back to schedule.

## 2019-01-07 NOTE — TELEPHONE ENCOUNTER
Routing refill request to provider for review/approval because:  Labs not current:  TSH    China Tran RN

## 2019-01-07 NOTE — TELEPHONE ENCOUNTER
Due for physical with fasting labs. Can wait until zuleima is back if patient prefers. Refilled for 3 months.   Dorinda Russ PA-C

## 2019-01-24 ENCOUNTER — DOCUMENTATION ONLY (OUTPATIENT)
Dept: OTHER | Facility: CLINIC | Age: 84
End: 2019-01-24

## 2019-02-06 ENCOUNTER — OFFICE VISIT (OUTPATIENT)
Dept: OPHTHALMOLOGY | Facility: CLINIC | Age: 84
End: 2019-02-06
Payer: COMMERCIAL

## 2019-02-06 DIAGNOSIS — Z96.1 PSEUDOPHAKIA: Primary | ICD-10-CM

## 2019-02-06 DIAGNOSIS — H43.813 POSTERIOR VITREOUS DETACHMENT OF BOTH EYES: ICD-10-CM

## 2019-02-06 DIAGNOSIS — H52.203 ASTIGMATISM OF BOTH EYES, UNSPECIFIED TYPE: ICD-10-CM

## 2019-02-06 DIAGNOSIS — H52.4 PRESBYOPIA: ICD-10-CM

## 2019-02-06 DIAGNOSIS — H04.123 DRY EYES, BILATERAL: ICD-10-CM

## 2019-02-06 DIAGNOSIS — H52.13 MYOPIA OF BOTH EYES: ICD-10-CM

## 2019-02-06 DIAGNOSIS — H50.00 ESOTROPIA: ICD-10-CM

## 2019-02-06 PROCEDURE — 92014 COMPRE OPH EXAM EST PT 1/>: CPT | Performed by: STUDENT IN AN ORGANIZED HEALTH CARE EDUCATION/TRAINING PROGRAM

## 2019-02-06 PROCEDURE — 92015 DETERMINE REFRACTIVE STATE: CPT | Performed by: STUDENT IN AN ORGANIZED HEALTH CARE EDUCATION/TRAINING PROGRAM

## 2019-02-06 RX ORDER — LORAZEPAM 0.5 MG/1
TABLET ORAL
Refills: 1 | COMMUNITY
Start: 2018-05-16 | End: 2021-07-14

## 2019-02-06 RX ORDER — MAGNESIUM OXIDE 400 MG/1
400 TABLET ORAL
COMMUNITY
End: 2020-08-21

## 2019-02-06 ASSESSMENT — REFRACTION_WEARINGRX
SPECS_TYPE: PAL
OD_AXIS: 125
OS_ADD: 2.99
OD_CYLINDER: +1.50
OS_CYLINDER: +0.75
OS_AXIS: 020
OS_HPRISM: 5 BO
OD_VPRISM: 2 BU
OD_ADD: 3.06
OS_SPHERE: -1.00
OD_SPHERE: -2.00

## 2019-02-06 ASSESSMENT — EXTERNAL EXAM - LEFT EYE: OS_EXAM: NORMAL

## 2019-02-06 ASSESSMENT — SLIT LAMP EXAM - LIDS
COMMENTS: LEVATOR DEHISCENSE
COMMENTS: LEVATOR DEHISCENSE

## 2019-02-06 ASSESSMENT — VISUAL ACUITY
OD_CC+: +2
OD_PH_CC: 25-3
CORRECTION_TYPE: GLASSES
OD_CC: 20/40
OD_CC: 1
OS_CC: 20/20
OS_CC+: -1
METHOD: SNELLEN - LINEAR
OS_CC: 2

## 2019-02-06 ASSESSMENT — REFRACTION_MANIFEST
OS_SPHERE: -0.75
OD_AXIS: 157
OD_SPHERE: -1.50
OD_CYLINDER: +0.75
OS_ADD: +3.00
OD_ADD: +3.00
OS_CYLINDER: +0.75
OS_AXIS: 180

## 2019-02-06 ASSESSMENT — TONOMETRY
OS_IOP_MMHG: 18
IOP_METHOD: APPLANATION
OD_IOP_MMHG: 18

## 2019-02-06 ASSESSMENT — REFRACTION_FINALRX
OS_HPRISM: 6 BO
OD_HPRISM: 6 BO
OD_VPRISM: 2 BU

## 2019-02-06 ASSESSMENT — EXTERNAL EXAM - RIGHT EYE: OD_EXAM: NORMAL

## 2019-02-06 ASSESSMENT — CONF VISUAL FIELD
OD_NORMAL: 1
OS_NORMAL: 1

## 2019-02-06 ASSESSMENT — CUP TO DISC RATIO
OS_RATIO: 0.35
OD_RATIO: 0.4

## 2019-02-06 NOTE — LETTER
"  2/6/2019     RE: Ingrid Culver  [address] [place] [id number]      Dear Dr. Sheets,    Thank you for referring your patient, Ingrid Culver, to the [facility].     Her eye exam is stable.  Please see a copy of my visit note below.     Current Eye Medications:  Refresh both eyes daily.  No eye vitamins.     Subjective:  Comprehensive Eye Exam.  Patient complains of difficulty seeing to drive - the lines on the road appear to be moving over and oncoming cars have 4 headlights.  Occasionally, her left eye looks like there's a \"film over it.\"  No diplopia when reading, only when looking in the distance.  Her balance has been a problem recently.     Objective:  See Ophthalmology Exam.      Assessment:  Ingrid Culver is a 86 year old female who presents with:   Encounter Diagnoses   Name Primary?     Pseudophakia OU      Esotropia chronic c prism      Posterior vitreous detachment of both eyes      Dry eyes, bilateral        Plan:  Continue artificial tears up to four times a day as needed   Glasses prescription given - optional    Porfirio Rodriguez MD  [phone]        Again, thank you for allowing me to participate in the care of your patient.        Sincerely,        Porfirio Rodriguez MD    "

## 2019-02-06 NOTE — PATIENT INSTRUCTIONS
Continue artificial tears up to four times a day as needed     Glasses prescription given - optional    Porfirio Rodriguez MD  [phone]

## 2019-02-06 NOTE — PROGRESS NOTES
" Current Eye Medications:  Refresh both eyes daily.  No eye vitamins.     Subjective:  Comprehensive Eye Exam.  Patient complains of difficulty seeing to drive - the lines on the road appear to be moving over and oncoming cars have 4 headlights.  Occasionally, her left eye looks like there's a \"film over it.\"  No diplopia when reading, only when looking in the distance.  Her balance has been a problem recently.     Objective:  See Ophthalmology Exam.      Assessment:  Ingrid Culver is a 86 year old female who presents with:   Encounter Diagnoses   Name Primary?     Pseudophakia OU      Esotropia chronic c prism      Posterior vitreous detachment of both eyes      Dry eyes, bilateral        Plan:  Continue artificial tears up to four times a day as needed   Glasses prescription given - optional    Porfirio Rodriguez MD  [phone]      "

## 2019-03-09 DIAGNOSIS — K21.9 GASTROESOPHAGEAL REFLUX DISEASE, ESOPHAGITIS PRESENCE NOT SPECIFIED: ICD-10-CM

## 2019-03-11 NOTE — TELEPHONE ENCOUNTER
Routing refill request to provider for review/approval because:  Medication is reported/historical  Ayana Carmona RN [facility] Triage.

## 2019-03-11 NOTE — TELEPHONE ENCOUNTER
"Requested Prescriptions   Pending Prescriptions Disp Refills     ranitidine (ZANTAC) 150 MG tablet [Pharmacy Med Name: raNITIdine HCl Oral Tablet 150 MG] 60 tablet 1    Last Written Prescription Date:  11/5/18  Last Fill Quantity: 60,  # refills: 2   Last office visit: 4/19/2018 with prescribing provider:     Future Office Visit:     Sig: TAKE ONE TABLET BY MOUTH TWICE DAILY AS NEEDED FOR HEARTBURN    H2 Blockers Protocol Passed - 3/9/2019  5:14 PM       Passed - Patient is age 12 or older       Passed - Recent (12 mo) or future (30 days) visit within the authorizing provider's specialty    Patient had office visit in the last 12 months or has a visit in the next 30 days with authorizing provider or within the authorizing provider's specialty.  See \"Patient Info\" tab in inbasket, or \"Choose Columns\" in Meds & Orders section of the refill encounter.             Passed - Medication is active on med list          "

## 2019-04-05 DIAGNOSIS — E03.4 HYPOTHYROIDISM DUE TO ACQUIRED ATROPHY OF THYROID: ICD-10-CM

## 2019-04-05 NOTE — TELEPHONE ENCOUNTER
"Requested Prescriptions   Pending Prescriptions Disp Refills     levothyroxine (SYNTHROID/LEVOTHROID) 25 MCG tablet [Pharmacy Med Name: Levothyroxine Sodium Oral Tablet 25 MCG] 90 tablet 0    Last Written Prescription Date:  1-7-19  Last Fill Quantity: 90,  # refills: 0   Last office visit: 4/19/2018 with prescribing provider:     Future Office Visit:     Sig: TAKE ONE TABLET BY MOUTH ONE TIME DAILY    Thyroid Protocol Failed - 4/5/2019  2:38 PM       Failed - Normal TSH on file in past 12 months    Recent Labs   Lab Test 12/26/17  1025   TSH 2.59             Passed - Patient is 12 years or older       Passed - Recent (12 mo) or future (30 days) visit within the authorizing provider's specialty    Patient had office visit in the last 12 months or has a visit in the next 30 days with authorizing provider or within the authorizing provider's specialty.  See \"Patient Info\" tab in inbasket, or \"Choose Columns\" in Meds & Orders section of the refill encounter.             Passed - Medication is active on med list       Passed - No active pregnancy on record    If patient is pregnant or has had a positive pregnancy test, please check TSH.         Passed - No positive pregnancy test in past 12 months    If patient is pregnant or has had a positive pregnancy test, please check TSH.            "

## 2019-04-08 RX ORDER — LEVOTHYROXINE SODIUM 25 UG/1
TABLET ORAL
Qty: 30 TABLET | Refills: 0 | Status: SHIPPED | OUTPATIENT
Start: 2019-04-08 | End: 2019-04-29

## 2019-04-08 NOTE — TELEPHONE ENCOUNTER
See plan at 4/9/18 visit (last visit):        Medication is being filled for 1 time refill only due to:  Patient needs to be seen because overdue for annual physical and labs.     Encounter routed to team to reach out to patient to schedule.      Olga Wooten RN  [facility]

## 2019-04-29 DIAGNOSIS — E03.4 HYPOTHYROIDISM DUE TO ACQUIRED ATROPHY OF THYROID: ICD-10-CM

## 2019-04-29 RX ORDER — LEVOTHYROXINE SODIUM 25 UG/1
TABLET ORAL
Qty: 30 TABLET | Refills: 0 | Status: SHIPPED | OUTPATIENT
Start: 2019-04-29 | End: 2019-05-31

## 2019-04-29 NOTE — TELEPHONE ENCOUNTER
Prescription approved per [facility] Refill Protocol.  Patient has appt scheduled.  Franny Grover RN

## 2019-04-29 NOTE — TELEPHONE ENCOUNTER
"Requested Prescriptions   Pending Prescriptions Disp Refills     levothyroxine (SYNTHROID/LEVOTHROID) 25 MCG tablet [Pharmacy Med Name: Levothyroxine Sodium Oral Tablet 25 MCG] 30 tablet 0     Sig: TAKE ONE TABLET BY MOUTH ONE TIME DAILY   Last Written Prescription Date:  4-8-19  Last Fill Quantity: 30,  # refills: 0   Last office visit: 4/19/2018 with prescribing provider:     Future Office Visit:   Next 5 appointments (look out 90 days)    May 31, 2019 12:00 PM CDT  PHYSICAL with Radha Sheets MD  [facility] [address] [place] [id number]  [phone]             Thyroid Protocol Failed - 4/29/2019 10:47 AM        Failed - Recent (12 mo) or future (30 days) visit within the authorizing provider's specialty     Patient had office visit in the last 12 months or has a visit in the next 30 days with authorizing provider or within the authorizing provider's specialty.  See \"Patient Info\" tab in inbasket, or \"Choose Columns\" in Meds & Orders section of the refill encounter.              Failed - Normal TSH on file in past 12 months     Recent Labs   Lab Test 12/26/17  1025   TSH 2.59              Passed - Patient is 12 years or older        Passed - Medication is active on med list        Passed - No active pregnancy on record     If patient is pregnant or has had a positive pregnancy test, please check TSH.          Passed - No positive pregnancy test in past 12 months     If patient is pregnant or has had a positive pregnancy test, please check TSH.            "

## 2019-05-16 ENCOUNTER — TRANSFERRED RECORDS (OUTPATIENT)
Dept: HEALTH INFORMATION MANAGEMENT | Facility: CLINIC | Age: 84
End: 2019-05-16

## 2019-05-31 ENCOUNTER — OFFICE VISIT (OUTPATIENT)
Dept: FAMILY MEDICINE | Facility: CLINIC | Age: 84
End: 2019-05-31
Payer: COMMERCIAL

## 2019-05-31 VITALS
WEIGHT: 126 LBS | HEIGHT: 60 IN | TEMPERATURE: 98 F | OXYGEN SATURATION: 95 % | DIASTOLIC BLOOD PRESSURE: 72 MMHG | HEART RATE: 62 BPM | BODY MASS INDEX: 24.74 KG/M2 | SYSTOLIC BLOOD PRESSURE: 144 MMHG

## 2019-05-31 DIAGNOSIS — K21.9 GASTROESOPHAGEAL REFLUX DISEASE, ESOPHAGITIS PRESENCE NOT SPECIFIED: ICD-10-CM

## 2019-05-31 DIAGNOSIS — I25.10 CORONARY ARTERY DISEASE INVOLVING NATIVE CORONARY ARTERY OF NATIVE HEART, ANGINA PRESENCE UNSPECIFIED: ICD-10-CM

## 2019-05-31 DIAGNOSIS — Z00.00 ROUTINE GENERAL MEDICAL EXAMINATION AT A HEALTH CARE FACILITY: Primary | ICD-10-CM

## 2019-05-31 DIAGNOSIS — I10 HYPERTENSION GOAL BP (BLOOD PRESSURE) < 150/90: ICD-10-CM

## 2019-05-31 DIAGNOSIS — E03.4 HYPOTHYROIDISM DUE TO ACQUIRED ATROPHY OF THYROID: ICD-10-CM

## 2019-05-31 DIAGNOSIS — J47.9 BRONCHIECTASIS WITHOUT COMPLICATION (H): ICD-10-CM

## 2019-05-31 LAB
ANION GAP SERPL CALCULATED.3IONS-SCNC: 7 MMOL/L (ref 3–14)
BUN SERPL-MCNC: 23 MG/DL (ref 7–30)
CALCIUM SERPL-MCNC: 9.2 MG/DL (ref 8.5–10.1)
CHLORIDE SERPL-SCNC: 103 MMOL/L (ref 94–109)
CHOLEST SERPL-MCNC: 157 MG/DL
CO2 SERPL-SCNC: 28 MMOL/L (ref 20–32)
CREAT SERPL-MCNC: 0.88 MG/DL (ref 0.52–1.04)
ERYTHROCYTE [DISTWIDTH] IN BLOOD BY AUTOMATED COUNT: 13.9 % (ref 10–15)
GFR SERPL CREATININE-BSD FRML MDRD: 59 ML/MIN/{1.73_M2}
GLUCOSE SERPL-MCNC: 90 MG/DL (ref 70–99)
HCT VFR BLD AUTO: 39.9 % (ref 35–47)
HDLC SERPL-MCNC: 61 MG/DL
HGB BLD-MCNC: 13.1 G/DL (ref 11.7–15.7)
LDLC SERPL CALC-MCNC: 75 MG/DL
MCH RBC QN AUTO: 29.9 PG (ref 26.5–33)
MCHC RBC AUTO-ENTMCNC: 32.8 G/DL (ref 31.5–36.5)
MCV RBC AUTO: 91 FL (ref 78–100)
NONHDLC SERPL-MCNC: 96 MG/DL
PLATELET # BLD AUTO: 252 10E9/L (ref 150–450)
POTASSIUM SERPL-SCNC: 4.8 MMOL/L (ref 3.4–5.3)
RBC # BLD AUTO: 4.38 10E12/L (ref 3.8–5.2)
SODIUM SERPL-SCNC: 138 MMOL/L (ref 133–144)
TRIGL SERPL-MCNC: 103 MG/DL
TSH SERPL DL<=0.005 MIU/L-ACNC: 1.79 MU/L (ref 0.4–4)
WBC # BLD AUTO: 6.6 10E9/L (ref 4–11)

## 2019-05-31 PROCEDURE — 85027 COMPLETE CBC AUTOMATED: CPT | Performed by: INTERNAL MEDICINE

## 2019-05-31 PROCEDURE — 80048 BASIC METABOLIC PNL TOTAL CA: CPT | Performed by: INTERNAL MEDICINE

## 2019-05-31 PROCEDURE — 99397 PER PM REEVAL EST PAT 65+ YR: CPT | Performed by: INTERNAL MEDICINE

## 2019-05-31 PROCEDURE — 36415 COLL VENOUS BLD VENIPUNCTURE: CPT | Performed by: INTERNAL MEDICINE

## 2019-05-31 PROCEDURE — 99213 OFFICE O/P EST LOW 20 MIN: CPT | Mod: 25 | Performed by: INTERNAL MEDICINE

## 2019-05-31 PROCEDURE — 84443 ASSAY THYROID STIM HORMONE: CPT | Performed by: INTERNAL MEDICINE

## 2019-05-31 PROCEDURE — 80061 LIPID PANEL: CPT | Performed by: INTERNAL MEDICINE

## 2019-05-31 RX ORDER — LEVOTHYROXINE SODIUM 25 UG/1
25 TABLET ORAL DAILY
Qty: 30 TABLET | Refills: 0 | Status: SHIPPED | OUTPATIENT
Start: 2019-05-31 | End: 2019-06-02

## 2019-05-31 RX ORDER — METOPROLOL TARTRATE 50 MG
TABLET ORAL
Qty: 135 TABLET | Refills: 3 | Status: SHIPPED | OUTPATIENT
Start: 2019-05-31 | End: 2020-03-23

## 2019-05-31 ASSESSMENT — ENCOUNTER SYMPTOMS
SHORTNESS OF BREATH: 1
DIZZINESS: 1
ARTHRALGIAS: 1
NERVOUS/ANXIOUS: 1
COUGH: 0
HEADACHES: 0
SORE THROAT: 0
CONSTIPATION: 0
ABDOMINAL PAIN: 0
BREAST MASS: 0
HEARTBURN: 1
MYALGIAS: 0
JOINT SWELLING: 1
PARESTHESIAS: 0
FREQUENCY: 1
EYE PAIN: 0
DYSURIA: 0
HEMATOCHEZIA: 0
PALPITATIONS: 0
FEVER: 0
NAUSEA: 0
DIARRHEA: 0
CHILLS: 0
HEMATURIA: 0
WEAKNESS: 1

## 2019-05-31 ASSESSMENT — PATIENT HEALTH QUESTIONNAIRE - PHQ9
10. IF YOU CHECKED OFF ANY PROBLEMS, HOW DIFFICULT HAVE THESE PROBLEMS MADE IT FOR YOU TO DO YOUR WORK, TAKE CARE OF THINGS AT HOME, OR GET ALONG WITH OTHER PEOPLE: NOT DIFFICULT AT ALL
SUM OF ALL RESPONSES TO PHQ QUESTIONS 1-9: 6
SUM OF ALL RESPONSES TO PHQ QUESTIONS 1-9: 6

## 2019-05-31 ASSESSMENT — MIFFLIN-ST. JEOR: SCORE: 933.03

## 2019-05-31 ASSESSMENT — ACTIVITIES OF DAILY LIVING (ADL): CURRENT_FUNCTION: NO ASSISTANCE NEEDED

## 2019-05-31 NOTE — PROGRESS NOTES
"SUBJECTIVE:   Ingrid Culver is a 86 year old female who presents for Preventive Visit.    87 y/o F here for AFE.   H/o Bronchiectasis (azithromycin 3X/wk), HTN, Hypothyroid, CAD (RCA Stent last year,  Still on Plavix).   Sees pulmonologist yearly      Stopped driving.    Lives in [facility].  Son lives there too and helps with physical work.  He works nights.      Are you in the first 12 months of your Medicare coverage?  No    Healthy Habits:     In general, how would you rate your overall health?  Good    Frequency of exercise:  2-3 days/week    Duration of exercise:  30-45 minutes    Do you usually eat at least 4 servings of fruit and vegetables a day, include whole grains    & fiber and avoid regularly eating high fat or \"junk\" foods?  Yes    Taking medications regularly:  Yes    Medication side effects:  None    Ability to successfully perform activities of daily living:  No assistance needed    Home Safety:  No safety concerns identified    Hearing Impairment:  Difficulty following a conversation in a noisy restaurant or crowded room, feel that people are mumbling or not speaking clearly and difficulty understanding soft or whispered speech    In the past 6 months, have you been bothered by leaking of urine? Yes    In general, how would you rate your overall mental or emotional health?  Good      PHQ-2 Total Score: 3    Additional concerns today:  No    Do you feel safe in your environment? YES    Do you have a Health Care Directive? Yes: Advance Directive has been received and scanned.      Fall risk       Cognitive Screening   1) Repeat 3 items (Leader, Season, Table)    2) Clock draw: NORMAL  3) 3 item recall: Recalls 3 objects  Results: 3 items recalled: COGNITIVE IMPAIRMENT LESS LIKELY    Mini-CogTM Copyright MACO Galloway. Licensed by the author for use in [facility]; reprinted with permission (sena@.[facility]). All rights reserved.      Do you have sleep apnea, excessive snoring or daytime " drowsiness?: yes-daytime drowsiness    Reviewed and updated as needed this visit by clinical staff         Reviewed and updated as needed this visit by Provider        Social History     Tobacco Use     Smoking status: Former Smoker     Types: Cigarettes     Last attempt to quit: 1974     Years since quittin.5     Smokeless tobacco: Never Used   Substance Use Topics     Alcohol use: Yes     Comment: occasional glass of wine     If you drink alcohol do you typically have >3 drinks per day or >7 drinks per week? No    Alcohol Use 2019   Prescreen: >3 drinks/day or >7 drinks/week? No   Prescreen: >3 drinks/day or >7 drinks/week? -           Discuss Zithromax side effects she has blurred vision     Current providers sharing in care for this patient include:   Patient Care Team:  Radha Sheets MD as PCP - General  Radha Sheets MD as Assigned PCP    The following health maintenance items are reviewed in Epic and correct as of today:  Health Maintenance   Topic Date Due     ZOSTER IMMUNIZATION (2 of 3) 2007     MEDICARE ANNUAL WELLNESS VISIT  2018     TSH W/FREE T4 REFLEX  2018     FALL RISK ASSESSMENT  2019     DEXA  2020     DIABETIC EYE EXAM  2020     LIPID  2023     ADVANCED DIRECTIVE PLANNING  2024     DTAP/TDAP/TD IMMUNIZATION (4 - Td) 2028     PHQ-2  Completed     INFLUENZA VACCINE  Completed     PNEUMOCOCCAL IMMUNIZATION 65+ LOW/MEDIUM RISK  Completed     IPV IMMUNIZATION  Aged Out     MENINGITIS IMMUNIZATION  Aged Out     Lab work is in process  Labs reviewed in EPIC  BP Readings from Last 3 Encounters:   19 144/72   18 134/78   18 144/84    Wt Readings from Last 3 Encounters:   19 57.2 kg (126 lb)   18 58.1 kg (128 lb)   17 56.2 kg (124 lb)                  Patient Active Problem List   Diagnosis     SHOULDER IMPINGEMENT - left     Hyperlipidemia LDL goal <130     Insomnia     Trigger finger,  acquired - right ring     Trigger thumb - right     Bronchiectasis (H)     Cerebral infarction (H)     Vitreous syneresis     PVD (posterior vitreous detachment)     Pseudophakia OU     Esotropia chronic c prism     Dry eyes, bilateral     Other idiopathic scoliosis, thoracolumbar region     Ganglion cyst of finger of right hand     Hypertension goal BP (blood pressure) < 150/90     Hypothyroidism due to acquired atrophy of thyroid     Coronary artery disease involving native coronary artery of native heart, angina presence unspecified     Past Surgical History:   Procedure Laterality Date     APPENDECTOMY       BREAST BIOPSY, RT/LT      Breat Biopsy RT/LT     C TOTAL KNEE ARTHROPLASTY  03    left poly exchange     CARDIAC SURGERY  2017    STINT     CATARACT IOL, RT/LT       COLONOSCOPY     And     diverticulosis     COLONOSCOPY  12     ENDOSCOPY  12     EXTRACAPSULAR CATARACT EXTRATION WITH INTRAOCULAR LENS IMPLANT  2000    both eyes     FLEXIBLE SIGMOIDOSCOPY  ,     JOINT REPLACEMTN, KNEE RT/LT  92    right       Social History     Tobacco Use     Smoking status: Former Smoker     Types: Cigarettes     Last attempt to quit: 1974     Years since quittin.5     Smokeless tobacco: Never Used   Substance Use Topics     Alcohol use: Yes     Comment: occasional glass of wine     Family History   Problem Relation Age of Onset     Breast Cancer Mother      Arthritis Mother      Thyroid Disease Mother      Cardiovascular Father      Asthma Maternal Grandmother      Cancer Brother      Cancer Son         tonsil and tongue, bladder     Alcohol/Drug Son      Heart Disease Daughter      Glaucoma No family hx of      Macular Degeneration No family hx of          Current Outpatient Medications   Medication Sig Dispense Refill     Acetaminophen (TYLENOL PO) Take 500 mg by mouth       aspirin 81 MG tablet Take by mouth daily       azithromycin (ZITHROMAX) 250 MG tablet 1 tablet  every M,W,F       clopidogrel (PLAVIX) 75 MG tablet Take by mouth daily       Docusate Sodium (COLACE PO)        ipratropium - albuterol 0.5 mg/2.5 mg/3 mL (DUONEB) 0.5-2.5 (3) MG/3ML neb solution Take 1 vial by nebulization every 6 hours as needed for shortness of breath / dyspnea or wheezing       levothyroxine (SYNTHROID/LEVOTHROID) 25 MCG tablet Take 1 tablet (25 mcg) by mouth daily 30 tablet 0     magnesium oxide (MAG-OX) 400 MG tablet Take 400 mg by mouth       metoprolol tartrate (LOPRESSOR) 50 MG tablet Take one tablet (50 mg) each morning, and take 1/2 tablet (25 mg) at night 135 tablet 3     Nitroglycerin (NITROTAB SL) Place 0.4 mg under the tongue       polyethylene glycol (MIRALAX/GLYCOLAX) Packet Take 1 packet by mouth daily       psyllium (METAMUCIL) WAFR Take 2 Wafers by mouth daily       ranitidine (ZANTAC) 150 MG tablet TAKE ONE TABLET BY MOUTH TWICE DAILY AS NEEDED FOR HEARTBURN 60 tablet 3     rosuvastatin (CRESTOR) 10 MG tablet Take 1 tablet (10 mg) by mouth daily 30 tablet      Vitamin D, Cholecalciferol, 1000 UNITS CAPS Take by mouth 2 times daily       acyclovir (ZOVIRAX) 800 MG tablet Take 1 tablet (800 mg) by mouth 5 times daily (Patient not taking: Reported on 5/31/2019) 35 tablet 0     LORazepam (ATIVAN) 0.5 MG tablet   1     magnesium 250 MG tablet Take 1 tablet by mouth daily       TYLENOL ARTHRITIS EXT RELIEF OR as needed       Allergies   Allergen Reactions     Zocor [Hmg-Coa-R Inhibitors]      Recent Labs   Lab Test 02/01/18  0927 12/26/17  1025 12/02/17 06/22/17  0753 12/19/16  1037  12/16/15  1051 12/11/14  0945   LDL 86  --  97  --  86  --  91 70   HDL 54  --  53  --  68  --  63 55   TRIG 172*  --  91  --  152*  --  156* 138   ALT  --   --   --  22  --   --  32 21   CR  --  0.73  --   --  0.71   < > 0.83 0.78   GFRESTIMATED  --  75  --   --  79  --  65 70   GFRESTBLACK  --  >90  --   --  >90  African American GFR Calc    --  79 85   POTASSIUM  --  4.5  --   --  4.2   < > 4.9 4.0  "  TSH  --  2.59  --   --  2.26  --  2.04 1.63    < > = values in this interval not displayed.           Review of Systems   Constitutional: Negative for chills and fever.   HENT: Positive for hearing loss. Negative for congestion, ear pain and sore throat.    Eyes: Positive for visual disturbance. Negative for pain.   Respiratory: Positive for shortness of breath. Negative for cough.    Cardiovascular: Negative for chest pain, palpitations and peripheral edema.   Gastrointestinal: Positive for heartburn. Negative for abdominal pain, constipation, diarrhea, hematochezia and nausea.   Breasts:  Negative for tenderness, breast mass and discharge.   Genitourinary: Positive for frequency and urgency. Negative for dysuria, genital sores, hematuria, pelvic pain, vaginal bleeding and vaginal discharge.   Musculoskeletal: Positive for arthralgias and joint swelling. Negative for myalgias.   Skin: Negative for rash.   Neurological: Positive for dizziness and weakness. Negative for headaches and paresthesias.   Psychiatric/Behavioral: Negative for mood changes. The patient is nervous/anxious.           OBJECTIVE:   /72 (BP Location: Right arm, Patient Position: Sitting, Cuff Size: Adult Regular)   Pulse 62   Temp 98  F (36.7  C) (Oral)   Ht 1.524 m (5')   Wt 57.2 kg (126 lb)   SpO2 95%   BMI 24.61 kg/m   Estimated body mass index is 24.79 kg/m  as calculated from the following:    Height as of 4/19/18: 1.53 m (5' 0.25\").    Weight as of 4/19/18: 58.1 kg (128 lb).  Physical Exam  GENERAL APPEARANCE: healthy, alert and no distress  EYES: Eyes grossly normal to inspection, PERRL and conjunctivae and sclerae normal  HENT: ear canals and TM's normal, nose and mouth without ulcers or lesions, oropharynx clear and oral mucous membranes moist  HENT: mildly [place]  NECK: no adenopathy, no asymmetry, masses, or scars and thyroid normal to palpation  RESP: lungs clear to auscultation - no rales, rhonchi or wheezes  BREAST: " normal without masses, tenderness or nipple discharge and no palpable axillary masses or adenopathy  CV: regular rate and rhythm, normal S1 S2, no S3 or S4, no murmur, click or rub, no peripheral edema and peripheral pulses strong  ABDOMEN: soft, nontender, no hepatosplenomegaly, no masses and bowel sounds normal   (female): normal female external genitalia, normal urethral meatus, vaginal mucosal atrophy noted, normal cervix, adnexae, and uterus without masses or abnormal discharge   (female): bimanual only  MS: no musculoskeletal defects are noted and gait is age appropriate without ataxia  MS:  patricio TKAs    scoliosis  SKIN: no suspicious lesions or rashes  NEURO: Normal strength and tone, sensory exam grossly normal, mentation intact and speech normal  PSYCH: mentation appears normal and affect normal/bright    Diagnostic Test Results:  Labs reviewed in Epic  See orders.      ASSESSMENT / PLAN:       ICD-10-CM    1. Routine general medical examination at a health care facility Z00.00    2. Bronchiectasis without complication (H) J47.9    3. Hypothyroidism due to acquired atrophy of thyroid E03.4 TSH with free T4 reflex     OFFICE/OUTPT VISIT,EST,LEVL III     levothyroxine (SYNTHROID/LEVOTHROID) 25 MCG tablet     DISCONTINUED: levothyroxine (SYNTHROID/LEVOTHROID) 25 MCG tablet   4. Coronary artery disease involving native coronary artery of native heart, angina presence unspecified I25.10 Lipid panel reflex to direct LDL Fasting     CBC with platelets     metoprolol tartrate (LOPRESSOR) 50 MG tablet   5. Hypertension goal BP (blood pressure) < 150/90 I10 Basic metabolic panel     metoprolol tartrate (LOPRESSOR) 50 MG tablet     OFFICE/OUTPT VISIT,EST,LEVL III   6. Gastroesophageal reflux disease, esophagitis presence not specified K21.9 ranitidine (ZANTAC) 150 MG tablet     OFFICE/OUTPT VISIT,EST,LEVL III       Patient Instructions   Consider trial of taking the Azithormycin 250 mg twice a week.  She wonders if  "some of her eye changes are due to zithromax, I doubt it.  Certainly can see if brochiectasis symptoms remain controlled on 2x weekly Abx rather than 3x weeklsy    Strongly consider shingles vaccine.  (Shingrix, 2 shots, 8 weeks apart)     Home BP are 110 - 120 and so no changes in meds today.     End of Life Planning:  Patient currently has an advanced directive: Yes.  Practitioner is supportive of decision.    COUNSELING:  Reviewed preventive health counseling, as reflected in patient instructions       Immunizations    dscussed shingrix.         Estimated body mass index is 24.79 kg/m  as calculated from the following:    Height as of 4/19/18: 1.53 m (5' 0.25\").    Weight as of 4/19/18: 58.1 kg (128 lb).         reports that she quit smoking about 44 years ago. Her smoking use included cigarettes. She has never used smokeless tobacco.      Appropriate preventive services were discussed with this patient, including applicable screening as appropriate for cardiovascular disease, diabetes, osteopenia/osteoporosis, and glaucoma.  As appropriate for age/gender, discussed screening for colorectal cancer, prostate cancer, breast cancer, and cervical cancer. Checklist reviewing preventive services available has been given to the patient.    Reviewed patients plan of care and provided an AVS. The Basic Care Plan (routine screening as documented in Health Maintenance) for Ingrid meets the Care Plan requirement. This Care Plan has been established and reviewed with the Patient.    Counseling Resources:  ATP IV Guidelines  Pooled Cohorts Equation Calculator  Breast Cancer Risk Calculator  FRAX Risk Assessment  ICSI Preventive Guidelines  Dietary Guidelines for Americans, 2010  USDA's MyPlate  ASA Prophylaxis  Lung CA Screening    Radha Sheets MD  [facility]    Identified Health Risks:  Answers for HPI/ROS submitted by the patient on 5/31/2019   Annual Exam:  If you checked off any problems, " how difficult have these problems made it for you to do your work, take care of things at home, or get along with other people?: Not difficult at all  PHQ9 TOTAL SCORE: 6

## 2019-05-31 NOTE — PATIENT INSTRUCTIONS
Consider trial of taking the Azithormycin 250 mg twice a week.      Strongly consider shingles vaccine.  (Shingrix, 2 shots, 8 weeks apart)

## 2019-06-01 ASSESSMENT — PATIENT HEALTH QUESTIONNAIRE - PHQ9: SUM OF ALL RESPONSES TO PHQ QUESTIONS 1-9: 6

## 2019-06-02 RX ORDER — LEVOTHYROXINE SODIUM 25 UG/1
25 TABLET ORAL DAILY
Qty: 90 TABLET | Refills: 3 | Status: SHIPPED | OUTPATIENT
Start: 2019-06-02 | End: 2020-03-23

## 2019-06-02 NOTE — RESULT ENCOUNTER NOTE
Ingrid Culver    Enclosed are your results.  Your labs are normal/stable at this time.  No thyroid dose change needed:  I will send a 1 year Rx.     Please call  with any questions.  We can also discuss any questions regarding these labs at your next scheduled visit.    Sincerely,    Radha Sheets M.D.

## 2019-07-03 ENCOUNTER — TRANSFERRED RECORDS (OUTPATIENT)
Dept: HEALTH INFORMATION MANAGEMENT | Facility: CLINIC | Age: 84
End: 2019-07-03

## 2019-09-28 ENCOUNTER — HEALTH MAINTENANCE LETTER (OUTPATIENT)
Age: 84
End: 2019-09-28

## 2019-12-30 ENCOUNTER — TRANSFERRED RECORDS (OUTPATIENT)
Dept: HEALTH INFORMATION MANAGEMENT | Facility: CLINIC | Age: 84
End: 2019-12-30

## 2020-01-27 ENCOUNTER — TRANSFERRED RECORDS (OUTPATIENT)
Dept: HEALTH INFORMATION MANAGEMENT | Facility: CLINIC | Age: 85
End: 2020-01-27

## 2020-02-25 ENCOUNTER — OFFICE VISIT (OUTPATIENT)
Dept: OPHTHALMOLOGY | Facility: CLINIC | Age: 85
End: 2020-02-25
Payer: COMMERCIAL

## 2020-02-25 ENCOUNTER — DOCUMENTATION ONLY (OUTPATIENT)
Dept: OTHER | Facility: CLINIC | Age: 85
End: 2020-02-25

## 2020-02-25 DIAGNOSIS — H52.4 PRESBYOPIA OF BOTH EYES: ICD-10-CM

## 2020-02-25 DIAGNOSIS — Z01.00 EXAMINATION OF EYES AND VISION: Primary | ICD-10-CM

## 2020-02-25 DIAGNOSIS — H50.00 ESOTROPIA: ICD-10-CM

## 2020-02-25 DIAGNOSIS — H52.13 MYOPIA OF BOTH EYES: ICD-10-CM

## 2020-02-25 DIAGNOSIS — H43.813 POSTERIOR VITREOUS DETACHMENT OF BOTH EYES: ICD-10-CM

## 2020-02-25 DIAGNOSIS — Z96.1 PSEUDOPHAKIA: ICD-10-CM

## 2020-02-25 DIAGNOSIS — H04.123 DRY EYES, BILATERAL: ICD-10-CM

## 2020-02-25 DIAGNOSIS — H52.223 REGULAR ASTIGMATISM OF BOTH EYES: ICD-10-CM

## 2020-02-25 PROCEDURE — 92015 DETERMINE REFRACTIVE STATE: CPT | Performed by: STUDENT IN AN ORGANIZED HEALTH CARE EDUCATION/TRAINING PROGRAM

## 2020-02-25 PROCEDURE — 92014 COMPRE OPH EXAM EST PT 1/>: CPT | Performed by: STUDENT IN AN ORGANIZED HEALTH CARE EDUCATION/TRAINING PROGRAM

## 2020-02-25 ASSESSMENT — REFRACTION_MANIFEST
OS_AXIS: 180
OD_ADD: +3.00
OD_AXIS: 155
OS_ADD: +3.00
OD_SPHERE: -1.50
OS_SPHERE: -0.75
OD_CYLINDER: +0.75
OS_CYLINDER: +1.00

## 2020-02-25 ASSESSMENT — REFRACTION_WEARINGRX
OD_ADD: +3.00
OS_AXIS: 178
OS_SPHERE: -0.75
OD_AXIS: 157
OD_VPRISM: 2.5 BU
OS_CYLINDER: +0.50
OD_SPHERE: -1.50
OD_CYLINDER: +0.75
OS_ADD: +3.00
SPECS_TYPE: PAL

## 2020-02-25 ASSESSMENT — VISUAL ACUITY
OS_CC: J1
OS_CC: 20/25+3
METHOD: SNELLEN - LINEAR
CORRECTION_TYPE: GLASSES
OD_CC: J1
OD_CC: 20/25+3

## 2020-02-25 ASSESSMENT — CUP TO DISC RATIO
OS_RATIO: 0.35
OD_RATIO: 0.4

## 2020-02-25 ASSESSMENT — REFRACTION_FINALRX: OD_VPRISM: 2.5BU

## 2020-02-25 ASSESSMENT — TONOMETRY
IOP_METHOD: APPLANATION
OD_IOP_MMHG: 18
OS_IOP_MMHG: 18

## 2020-02-25 ASSESSMENT — EXTERNAL EXAM - LEFT EYE: OS_EXAM: NORMAL

## 2020-02-25 ASSESSMENT — SLIT LAMP EXAM - LIDS
COMMENTS: LEVATOR DEHISCENSE
COMMENTS: LEVATOR DEHISCENSE

## 2020-02-25 ASSESSMENT — CONF VISUAL FIELD
OS_NORMAL: 1
OD_NORMAL: 1

## 2020-02-25 ASSESSMENT — EXTERNAL EXAM - RIGHT EYE: OD_EXAM: NORMAL

## 2020-02-25 NOTE — PATIENT INSTRUCTIONS
"Use artificial tears up to four times a day (like Refresh Optive, Systane Balance, TheraTears, or generic artificial tears are ok. Avoid \"get the red out\" drops).    Glasses prescription given    Porfirio Rodriguez MD  [phone]    "

## 2020-02-25 NOTE — PROGRESS NOTES
" Current Eye Medications:  no     Subjective:  Comprehensive eye exam.   Pt reports she continues to see double when driving.     Objective:  See Ophthalmology Exam.       Assessment:  Ingrid Culver is a 87 year old female who presents with:   Encounter Diagnoses   Name Primary?     Examination of eyes and vision      Myopia of both eyes      Regular astigmatism of both eyes      Presbyopia of both eyes        Pseudophakia OU      Esotropia chronic c prism Increase prism in glasses     Posterior vitreous detachment of both eyes      Dry eyes, bilateral        Plan:  Use artificial tears up to four times a day (like Refresh Optive, Systane Balance, TheraTears, or generic artificial tears are ok. Avoid \"get the red out\" drops).    Glasses prescription given    Porfirio Rodriguez MD  [phone]      "

## 2020-02-25 NOTE — LETTER
"    2/25/2020         RE: Ingrid Culver  [address] [place] [id number]        Dear Colleague,    Thank you for referring your patient, Ingrid Culver, to the [facility]. Please see a copy of my visit note below.     Current Eye Medications:  no     Subjective:  Comprehensive eye exam.   Pt reports she continues to see double when driving.     Objective:  See Ophthalmology Exam.       Assessment:  Ingrid Culver is a 87 year old female who presents with:   Encounter Diagnoses   Name Primary?     Examination of eyes and vision      Myopia of both eyes      Regular astigmatism of both eyes      Presbyopia of both eyes        Pseudophakia OU      Esotropia chronic c prism Increase prism in glasses     Posterior vitreous detachment of both eyes      Dry eyes, bilateral        Plan:  Use artificial tears up to four times a day (like Refresh Optive, Systane Balance, TheraTears, or generic artificial tears are ok. Avoid \"get the red out\" drops).    Glasses prescription given    Porfirio Rodriguez MD  [phone]        Again, thank you for allowing me to participate in the care of your patient.        Sincerely,        Porfirio Rodriguez MD    "

## 2020-02-28 ENCOUNTER — TELEPHONE (OUTPATIENT)
Dept: FAMILY MEDICINE | Facility: CLINIC | Age: 85
End: 2020-02-28

## 2020-02-28 DIAGNOSIS — E03.4 HYPOTHYROIDISM DUE TO ACQUIRED ATROPHY OF THYROID: ICD-10-CM

## 2020-02-28 DIAGNOSIS — I25.10 CORONARY ARTERY DISEASE INVOLVING NATIVE CORONARY ARTERY OF NATIVE HEART, ANGINA PRESENCE UNSPECIFIED: ICD-10-CM

## 2020-02-28 DIAGNOSIS — I10 HYPERTENSION GOAL BP (BLOOD PRESSURE) < 150/90: ICD-10-CM

## 2020-02-28 NOTE — TELEPHONE ENCOUNTER
"Requested Prescriptions   Pending Prescriptions Disp Refills     ipratropium - albuterol 0.5 mg/2.5 mg/3 mL (DUONEB) 0.5-2.5 (3) MG/3ML neb solution       Sig: Take 1 vial (3 mLs) by nebulization every 6 hours as needed for shortness of breath / dyspnea or wheezing         Last Written Prescription Date:  na  Last Fill Quantity: na,   # refills: na  Last Office Visit: 5/31/19  Future Office visit:       Routing refill request to provider for review/approval because:  Medication is reported/historical      Short-Acting Beta Agonist Inhalers Protocol  Passed - 2/28/2020  2:14 PM        Passed - Patient is age 12 or older        Passed - Recent (12 mo) or future (30 days) visit within the authorizing provider's specialty     Patient has had an office visit with the authorizing provider or a provider within the authorizing providers department within the previous 12 mos or has a future within next 30 days. See \"Patient Info\" tab in [employer], or \"Choose Columns\" in Meds & Orders section of the refill encounter.              Passed - Medication is active on med list        levothyroxine (SYNTHROID/LEVOTHROID) 25 MCG tablet 90 tablet 3     Sig: Take 1 tablet (25 mcg) by mouth daily   Last Written Prescription Date:  6/2/19  Last Fill Quantity: 90,  # refills: 3   Last office visit: 5/31/2019 with prescribing provider:     Future Office Visit:        Thyroid Protocol Passed - 2/28/2020  2:14 PM        Passed - Patient is 12 years or older        Passed - Recent (12 mo) or future (30 days) visit within the authorizing provider's specialty     Patient has had an office visit with the authorizing provider or a provider within the authorizing providers department within the previous 12 mos or has a future within next 30 days. See \"Patient Info\" tab in inbasket, or \"Choose Columns\" in Meds & Orders section of the refill encounter.              Passed - Medication is active on med list        Passed - Normal TSH on file in past " "12 months     Recent Labs   Lab Test 05/31/19  1305   TSH 1.79              Passed - No active pregnancy on record     If patient is pregnant or has had a positive pregnancy test, please check TSH.          Passed - No positive pregnancy test in past 12 months     If patient is pregnant or has had a positive pregnancy test, please check TSH.          clopidogrel (PLAVIX) 75 MG tablet       Sig: Take by mouth daily         Last Written Prescription Date:  na  Last Fill Quantity: na,   # refills: na  Last Office Visit: 5/31/19  Future Office visit:       Routing refill request to provider for review/approval because:  Medication is reported/historical      Plavix Passed - 2/28/2020  2:14 PM        Passed - No active PPI on record unless is Protonix        Passed - Normal HGB on file in past 12 months     Recent Labs   Lab Test 05/31/19  1305   HGB 13.1               Passed - Normal Platelets on file in past 12 months     Recent Labs   Lab Test 05/31/19  1305                  Passed - Recent (12 mo) or future (30 days) visit within the authorizing provider's specialty     Patient has had an office visit with the authorizing provider or a provider within the authorizing providers department within the previous 12 mos or has a future within next 30 days. See \"Patient Info\" tab in inbasket, or \"Choose Columns\" in Meds & Orders section of the refill encounter.              Passed - Medication is active on med list        Passed - Patient is age 18 or older        Passed - No active pregnancy on record        Passed - No positive pregnancy test in past 12 months          "

## 2020-03-02 RX ORDER — IPRATROPIUM BROMIDE AND ALBUTEROL SULFATE 2.5; .5 MG/3ML; MG/3ML
1 SOLUTION RESPIRATORY (INHALATION) EVERY 6 HOURS PRN
Status: CANCELLED | OUTPATIENT
Start: 2020-03-02

## 2020-03-02 RX ORDER — CLOPIDOGREL BISULFATE 75 MG/1
TABLET ORAL DAILY
Status: CANCELLED | OUTPATIENT
Start: 2020-03-02

## 2020-03-02 RX ORDER — METOPROLOL TARTRATE 50 MG
TABLET ORAL
Qty: 135 TABLET | Refills: 3 | Status: CANCELLED | OUTPATIENT
Start: 2020-03-02

## 2020-03-02 RX ORDER — LEVOTHYROXINE SODIUM 25 UG/1
25 TABLET ORAL DAILY
Qty: 90 TABLET | Refills: 3 | Status: CANCELLED | OUTPATIENT
Start: 2020-03-02

## 2020-03-02 NOTE — TELEPHONE ENCOUNTER
Patient left a message on voicemail that she does have refills until May.  Ayana Carmona RN,BSN  [facility]

## 2020-03-02 NOTE — TELEPHONE ENCOUNTER
?pharmacy?    I see lopressor may have also been requested but should not need refilled until May 2020 ([facility] was the pharmacy for that Rx).    Levothyroxine also should have a 90 day fill available at [facility] according to Epic med list.       Patient was last seen 5/31/19 by [facility].    Follow up around 5/31/20 advised.    Duoneb and plavix are historical on Epic.      Attempted to call patient at home/mobile number, left message on voicemail; patient was instructed to return call to [facility] RN directly on the RN call back line at [phone]     ?Desired pharmacy?   Did they call [facility] for refills?   Need to verify dose/dosing on the plavix and duoneb (historical) if those need to be refilled by [facility].    Olga Wooten, RN  [facility]

## 2020-03-23 DIAGNOSIS — I10 HYPERTENSION GOAL BP (BLOOD PRESSURE) < 150/90: ICD-10-CM

## 2020-03-23 DIAGNOSIS — E03.4 HYPOTHYROIDISM DUE TO ACQUIRED ATROPHY OF THYROID: ICD-10-CM

## 2020-03-23 DIAGNOSIS — I25.10 CORONARY ARTERY DISEASE INVOLVING NATIVE CORONARY ARTERY OF NATIVE HEART, ANGINA PRESENCE UNSPECIFIED: ICD-10-CM

## 2020-03-23 RX ORDER — METOPROLOL TARTRATE 50 MG
TABLET ORAL
Qty: 135 TABLET | Refills: 3 | Status: SHIPPED | OUTPATIENT
Start: 2020-03-23 | End: 2020-08-21

## 2020-03-23 RX ORDER — LEVOTHYROXINE SODIUM 25 UG/1
25 TABLET ORAL DAILY
Qty: 90 TABLET | Refills: 3 | Status: SHIPPED | OUTPATIENT
Start: 2020-03-23 | End: 2021-03-19

## 2020-03-23 NOTE — TELEPHONE ENCOUNTER
"Requested Prescriptions   Pending Prescriptions Disp Refills     metoprolol tartrate (LOPRESSOR) 50 MG tablet 135 tablet 3     Sig: Take one tablet (50 mg) each morning, and take 1/2 tablet (25 mg) at night   Last Written Prescription Date:  5/31/19  Last Fill Quantity: 135,  # refills: 3   Last office visit: 5/31/2019 with prescribing provider:     Future Office Visit:        Beta-Blockers Protocol Failed - 3/23/2020  3:11 PM        Failed - Blood pressure under 140/90 in past 12 months     BP Readings from Last 3 Encounters:   05/31/19 144/72   04/19/18 134/78   04/17/18 144/84                 Passed - Patient is age 6 or older        Passed - Recent (12 mo) or future (30 days) visit within the authorizing provider's specialty     Patient has had an office visit with the authorizing provider or a provider within the authorizing providers department within the previous 12 mos or has a future within next 30 days. See \"Patient Info\" tab in inbasket, or \"Choose Columns\" in Meds & Orders section of the refill encounter.              Passed - Medication is active on med list           levothyroxine (SYNTHROID/LEVOTHROID) 25 MCG tablet 90 tablet 3     Sig: Take 1 tablet (25 mcg) by mouth daily   Last Written Prescription Date:  6/2/19  Last Fill Quantity: 90,  # refills: 3   Last office visit: 5/31/2019 with prescribing provider:     Future Office Visit:        Thyroid Protocol Passed - 3/23/2020  3:11 PM        Passed - Patient is 12 years or older        Passed - Recent (12 mo) or future (30 days) visit within the authorizing provider's specialty     Patient has had an office visit with the authorizing provider or a provider within the authorizing providers department within the previous 12 mos or has a future within next 30 days. See \"Patient Info\" tab in inbasket, or \"Choose Columns\" in Meds & Orders section of the refill encounter.              Passed - Medication is active on med list        Passed - Normal TSH on " file in past 12 months     Recent Labs   Lab Test 05/31/19  1305   TSH 1.79              Passed - No active pregnancy on record     If patient is pregnant or has had a positive pregnancy test, please check TSH.          Passed - No positive pregnancy test in past 12 months     If patient is pregnant or has had a positive pregnancy test, please check TSH.

## 2020-08-21 ENCOUNTER — ANCILLARY PROCEDURE (OUTPATIENT)
Dept: GENERAL RADIOLOGY | Facility: CLINIC | Age: 85
End: 2020-08-21
Attending: INTERNAL MEDICINE
Payer: COMMERCIAL

## 2020-08-21 ENCOUNTER — OFFICE VISIT (OUTPATIENT)
Dept: FAMILY MEDICINE | Facility: CLINIC | Age: 85
End: 2020-08-21
Payer: COMMERCIAL

## 2020-08-21 VITALS
HEART RATE: 60 BPM | BODY MASS INDEX: 24.94 KG/M2 | SYSTOLIC BLOOD PRESSURE: 139 MMHG | DIASTOLIC BLOOD PRESSURE: 76 MMHG | TEMPERATURE: 98.2 F | WEIGHT: 127 LBS | OXYGEN SATURATION: 96 % | HEIGHT: 60 IN

## 2020-08-21 DIAGNOSIS — R06.09 DOE (DYSPNEA ON EXERTION): ICD-10-CM

## 2020-08-21 DIAGNOSIS — Z12.31 ENCOUNTER FOR SCREENING MAMMOGRAM FOR BREAST CANCER: ICD-10-CM

## 2020-08-21 DIAGNOSIS — E78.5 HYPERLIPIDEMIA LDL GOAL <130: ICD-10-CM

## 2020-08-21 DIAGNOSIS — Z00.01 ENCOUNTER FOR GENERAL ADULT MEDICAL EXAMINATION WITH ABNORMAL FINDINGS: Primary | ICD-10-CM

## 2020-08-21 DIAGNOSIS — E03.4 HYPOTHYROIDISM DUE TO ACQUIRED ATROPHY OF THYROID: ICD-10-CM

## 2020-08-21 DIAGNOSIS — Z00.00 ENCOUNTER FOR MEDICARE ANNUAL WELLNESS EXAM: ICD-10-CM

## 2020-08-21 DIAGNOSIS — J47.9 BRONCHIECTASIS WITHOUT COMPLICATION (H): ICD-10-CM

## 2020-08-21 DIAGNOSIS — Z51.81 ENCOUNTER FOR MONITORING ANTIPLATELET THERAPY: ICD-10-CM

## 2020-08-21 DIAGNOSIS — M41.25 OTHER IDIOPATHIC SCOLIOSIS, THORACOLUMBAR REGION: ICD-10-CM

## 2020-08-21 DIAGNOSIS — I10 HYPERTENSION GOAL BP (BLOOD PRESSURE) < 150/90: ICD-10-CM

## 2020-08-21 DIAGNOSIS — Z78.0 ASYMPTOMATIC POSTMENOPAUSAL STATUS: ICD-10-CM

## 2020-08-21 DIAGNOSIS — Z79.02 ENCOUNTER FOR MONITORING ANTIPLATELET THERAPY: ICD-10-CM

## 2020-08-21 DIAGNOSIS — I25.10 CORONARY ARTERY DISEASE INVOLVING NATIVE CORONARY ARTERY OF NATIVE HEART, ANGINA PRESENCE UNSPECIFIED: ICD-10-CM

## 2020-08-21 LAB
ANION GAP SERPL CALCULATED.3IONS-SCNC: 6 MMOL/L (ref 3–14)
BUN SERPL-MCNC: 25 MG/DL (ref 7–30)
CALCIUM SERPL-MCNC: 9.1 MG/DL (ref 8.5–10.1)
CHLORIDE SERPL-SCNC: 104 MMOL/L (ref 94–109)
CHOLEST SERPL-MCNC: 151 MG/DL
CO2 SERPL-SCNC: 28 MMOL/L (ref 20–32)
CREAT SERPL-MCNC: 0.93 MG/DL (ref 0.52–1.04)
ERYTHROCYTE [DISTWIDTH] IN BLOOD BY AUTOMATED COUNT: 14.2 % (ref 10–15)
GFR SERPL CREATININE-BSD FRML MDRD: 55 ML/MIN/{1.73_M2}
GLUCOSE SERPL-MCNC: 84 MG/DL (ref 70–99)
HCT VFR BLD AUTO: 38.2 % (ref 35–47)
HDLC SERPL-MCNC: 56 MG/DL
HGB BLD-MCNC: 12.5 G/DL (ref 11.7–15.7)
LDLC SERPL CALC-MCNC: 69 MG/DL
MCH RBC QN AUTO: 29.8 PG (ref 26.5–33)
MCHC RBC AUTO-ENTMCNC: 32.7 G/DL (ref 31.5–36.5)
MCV RBC AUTO: 91 FL (ref 78–100)
NONHDLC SERPL-MCNC: 95 MG/DL
PLATELET # BLD AUTO: 251 10E9/L (ref 150–450)
POTASSIUM SERPL-SCNC: 4.5 MMOL/L (ref 3.4–5.3)
RBC # BLD AUTO: 4.2 10E12/L (ref 3.8–5.2)
SODIUM SERPL-SCNC: 138 MMOL/L (ref 133–144)
TRIGL SERPL-MCNC: 128 MG/DL
TSH SERPL DL<=0.005 MIU/L-ACNC: 2.75 MU/L (ref 0.4–4)
WBC # BLD AUTO: 7.7 10E9/L (ref 4–11)

## 2020-08-21 PROCEDURE — 36415 COLL VENOUS BLD VENIPUNCTURE: CPT | Performed by: INTERNAL MEDICINE

## 2020-08-21 PROCEDURE — 85027 COMPLETE CBC AUTOMATED: CPT | Performed by: INTERNAL MEDICINE

## 2020-08-21 PROCEDURE — 80048 BASIC METABOLIC PNL TOTAL CA: CPT | Performed by: INTERNAL MEDICINE

## 2020-08-21 PROCEDURE — 99397 PER PM REEVAL EST PAT 65+ YR: CPT | Performed by: INTERNAL MEDICINE

## 2020-08-21 PROCEDURE — 84443 ASSAY THYROID STIM HORMONE: CPT | Performed by: INTERNAL MEDICINE

## 2020-08-21 PROCEDURE — 99213 OFFICE O/P EST LOW 20 MIN: CPT | Mod: 25 | Performed by: INTERNAL MEDICINE

## 2020-08-21 PROCEDURE — 80061 LIPID PANEL: CPT | Performed by: INTERNAL MEDICINE

## 2020-08-21 PROCEDURE — 71046 X-RAY EXAM CHEST 2 VIEWS: CPT

## 2020-08-21 RX ORDER — METOPROLOL TARTRATE 50 MG
25 TABLET ORAL 2 TIMES DAILY
Qty: 90 TABLET | Refills: 3 | Status: SHIPPED | OUTPATIENT
Start: 2020-08-21 | End: 2021-09-20

## 2020-08-21 RX ORDER — ISOSORBIDE DINITRATE 30 MG/1
30 TABLET ORAL DAILY
COMMUNITY

## 2020-08-21 ASSESSMENT — ACTIVITIES OF DAILY LIVING (ADL): CURRENT_FUNCTION: NO ASSISTANCE NEEDED

## 2020-08-21 ASSESSMENT — MIFFLIN-ST. JEOR: SCORE: 924.63

## 2020-08-21 NOTE — PATIENT INSTRUCTIONS
Patient Education   Personalized Prevention Plan  You are due for the preventive services outlined below.  Your care team is available to assist you in scheduling these services.  If you have already completed any of these items, please share that information with your care team to update in your medical record.  Health Maintenance Due   Topic Date Due     Zoster (Shingles) Vaccine (2 of 3) 12/20/2007     PHQ-2  01/01/2020     Osteoporosis Screening  01/06/2020     Annual Wellness Visit  05/31/2020     Cholesterol Lab  05/31/2020     Thyroid Function Lab  05/31/2020     FALL RISK ASSESSMENT  05/31/2020           Reduce metoprolol to 1/2 tab twice daily  (consider 1/2 tab ONCE daily if appropriate)    Call to schedule imaging   :  Mammogram and Bone Density at [place]

## 2020-08-21 NOTE — PROGRESS NOTES
"SUBJECTIVE:   Ingrid Culver is a 87 year old female who presents for Preventive Visit.    86 y/o F here for AFE.   H/o Bronchiectasis (azithromycin 3X/wk), HTN, Hypothyroid, CAD (RCA Stent 2018, follows with senior care).   Sees pulmonologist yearly.         Cardiology started Imdur this past year due to suspected angina... Cardiac MRI revealed some small area of cardiac ischemia and Imdur added.   she feels she gets winded easily:  Up a flight stairs and to mailbox.  She saw her Pulmonologist who did \"spirometry\" and told her lung function was good.   In meantime she is working on her stamina, getting more movement and the QUINTEROS and balance a bit better.  Since Covid pandemic she had stopped her activities and senior exercises.       Are you in the first 12 months of your Medicare coverage?  No    Healthy Habits:    In general, how would you rate your overall health?  Fair    Frequency of exercise:  6-7 days/week    Duration of exercise:  15-30 minutes    Do you usually eat at least 4 servings of fruit and vegetables a day, include whole grains    & fiber and avoid regularly eating high fat or \"junk\" foods?  Yes    Taking medications regularly:  Yes    Barriers to taking medications:  None    Medication side effects:  Other    Ability to successfully perform activities of daily living:  No assistance needed    Home Safety:  No safety concerns identified    Hearing Impairment:  Difficulty understanding speech on the telephone    In the past 6 months, have you been bothered by leaking of urine? Yes    In general, how would you rate your overall mental or emotional health?  Good      PHQ-2 Total Score:    Additional concerns today:: anxiety.    Do you feel safe in your environment? YES    Have you ever done Advance Care Planning? (For example, a Health Directive, POLST, or a discussion with a medical provider or your loved ones about your wishes): Yes, advance care planning is on file.      Fall risk       Cognitive " Screening   1) Repeat 3 items (Leader, Season, Table)    2) Clock draw: NORMAL  3) 3 item recall: Recalls 3 objects  Results: 3 items recalled: COGNITIVE IMPAIRMENT LESS LIKELY    Mini-CogTM Copyright MACO Galloway. Licensed by the author for use in [facility] [employer]; reprinted with permission (sena@[facility]). All rights reserved.      Do you have sleep apnea, excessive snoring or daytime drowsiness?: yes-daytime drowsiness    Reviewed and updated as needed this visit by clinical staff         Reviewed and updated as needed this visit by Provider        Social History     Tobacco Use     Smoking status: Former Smoker     Types: Cigarettes     Last attempt to quit: 1974     Years since quittin.8     Smokeless tobacco: Never Used   Substance Use Topics     Alcohol use: Yes     Comment: occasional glass of wine     If you drink alcohol do you typically have >3 drinks per day or >7 drinks per week? No    Alcohol Use 2019   Prescreen: >3 drinks/day or >7 drinks/week? No   Prescreen: >3 drinks/day or >7 drinks/week? -               Current providers sharing in care for this patient include:   Patient Care Team:  Radha Sheets MD as PCP - General  Radha Sheets MD as Assigned PCP    The following health maintenance items are reviewed in Epic and correct as of today:  Health Maintenance   Topic Date Due     ZOSTER IMMUNIZATION (2 of 3) 2007     PHQ-2  2020     DEXA  2020     MEDICARE ANNUAL WELLNESS VISIT  2020     LIPID  2020     TSH W/FREE T4 REFLEX  2020     FALL RISK ASSESSMENT  2020     INFLUENZA VACCINE (1) 2020     EYE EXAM  2021     ADVANCE CARE PLANNING  2025     DTAP/TDAP/TD IMMUNIZATION (4 - Td) 2028     PNEUMOCOCCAL IMMUNIZATION 65+ LOW/MEDIUM RISK  Completed     IPV IMMUNIZATION  Aged Out     MENINGITIS IMMUNIZATION  Aged Out     HEPATITIS B IMMUNIZATION  Aged Out     Lab work is in process  BP Readings from Last 3  Encounters:   20 139/76   19 144/72   18 134/78    Wt Readings from Last 3 Encounters:   20 57.6 kg (127 lb)   19 57.2 kg (126 lb)   18 58.1 kg (128 lb)                  Patient Active Problem List   Diagnosis     SHOULDER IMPINGEMENT - left     Hyperlipidemia LDL goal <130     Insomnia     Trigger finger, acquired - right ring     Trigger thumb - right     Bronchiectasis (H)     Cerebral infarction (H)     Vitreous syneresis     PVD (posterior vitreous detachment)     Pseudophakia OU     Esotropia chronic c prism     Dry eyes, bilateral     Other idiopathic scoliosis, thoracolumbar region     Ganglion cyst of finger of right hand     Hypertension goal BP (blood pressure) < 150/90     Hypothyroidism due to acquired atrophy of thyroid     Coronary artery disease involving native coronary artery of native heart, angina presence unspecified     Past Surgical History:   Procedure Laterality Date     APPENDECTOMY       BREAST BIOPSY, RT/LT      Breat Biopsy RT/LT     C TOTAL KNEE ARTHROPLASTY  03    left poly exchange     CARDIAC SURGERY  2017    STINT     CATARACT IOL, RT/LT       COLONOSCOPY     And     diverticulosis     COLONOSCOPY  12     ENDOSCOPY  12     EXTRACAPSULAR CATARACT EXTRATION WITH INTRAOCULAR LENS IMPLANT  2000    both eyes     FLEXIBLE SIGMOIDOSCOPY  ,     JOINT REPLACEMTN, KNEE RT/LT  92    right       Social History     Tobacco Use     Smoking status: Former Smoker     Types: Cigarettes     Last attempt to quit: 1974     Years since quittin.8     Smokeless tobacco: Never Used   Substance Use Topics     Alcohol use: Yes     Comment: occasional glass of wine     Family History   Problem Relation Age of Onset     Breast Cancer Mother      Arthritis Mother      Thyroid Disease Mother      Cardiovascular Father      Asthma Maternal Grandmother      Cancer Brother      Cancer Son         tonsil and tongue, bladder      Alcohol/Drug Son      Heart Disease Daughter      Glaucoma No family hx of      Macular Degeneration No family hx of          Current Outpatient Medications   Medication Sig Dispense Refill     Acetaminophen (TYLENOL PO) Take 500 mg by mouth       aspirin 81 MG tablet Take by mouth daily       azithromycin (ZITHROMAX) 250 MG tablet 1 tablet every M,W,F       clopidogrel (PLAVIX) 75 MG tablet Take by mouth daily       Docusate Sodium (COLACE PO)        ipratropium - albuterol 0.5 mg/2.5 mg/3 mL (DUONEB) 0.5-2.5 (3) MG/3ML neb solution Take 1 vial by nebulization every 6 hours as needed for shortness of breath / dyspnea or wheezing       isosorbide dinitrate (ISORDIL) 30 MG tablet Take 30 mg by mouth daily       levothyroxine (SYNTHROID/LEVOTHROID) 25 MCG tablet Take 1 tablet (25 mcg) by mouth daily 90 tablet 3     magnesium 250 MG tablet Take 1 tablet by mouth daily       metoprolol tartrate (LOPRESSOR) 50 MG tablet Take one tablet (50 mg) each morning, and take 1/2 tablet (25 mg) at night 135 tablet 3     Nitroglycerin (NITROTAB SL) Place 0.4 mg under the tongue       polyethylene glycol (MIRALAX/GLYCOLAX) Packet Take 1 packet by mouth daily       psyllium (METAMUCIL) WAFR Take 2 Wafers by mouth daily       rosuvastatin (CRESTOR) 10 MG tablet Take 1 tablet (10 mg) by mouth daily 30 tablet      Vitamin D, Cholecalciferol, 1000 UNITS CAPS Take by mouth 2 times daily       acyclovir (ZOVIRAX) 800 MG tablet Take 1 tablet (800 mg) by mouth 5 times daily (Patient not taking: Reported on 5/31/2019) 35 tablet 0     LORazepam (ATIVAN) 0.5 MG tablet   1     TYLENOL ARTHRITIS EXT RELIEF OR as needed       Allergies   Allergen Reactions     Zocor [Hmg-Coa-R Inhibitors]      Recent Labs   Lab Test 05/31/19  1305 02/01/18  0927 12/26/17  1025 12/02/17 06/22/17  0753  12/16/15  1051 12/11/14  0945   LDL 75 86  --  97  --    < > 91 70   HDL 61 54  --  53  --    < > 63 55   TRIG 103 172*  --  91  --    < > 156* 138   ALT  --   --    "--   --  22  --  32 21   CR 0.88  --  0.73  --   --    < > 0.83 0.78   GFRESTIMATED 59*  --  75  --   --    < > 65 70   GFRESTBLACK 69  --  >90  --   --    < > 79 85   POTASSIUM 4.8  --  4.5  --   --    < > 4.9 4.0   TSH 1.79  --  2.59  --   --    < > 2.04 1.63    < > = values in this interval not displayed.           Review of Systems  Constitutional, HEENT, cardiovascular, pulmonary, GI, , musculoskeletal, neuro, skin, endocrine and psych systems are negative, except as otherwise noted.       OBJECTIVE:   /76 (BP Location: Right arm, Patient Position: Sitting, Cuff Size: Adult Regular)   Pulse 60   Temp 98.2  F (36.8  C) (Oral)   Ht 1.511 m (4' 11.5\")   Wt 57.6 kg (127 lb)   SpO2 96%   BMI 25.22 kg/m   Estimated body mass index is 24.61 kg/m  as calculated from the following:    Height as of 5/31/19: 1.524 m (5').    Weight as of 5/31/19: 57.2 kg (126 lb).     Physical Exam  GENERAL APPEARANCE: healthy, alert and no distress  EYES: Eyes grossly normal to inspection, PERRL and conjunctivae and sclerae normal  HENT: ear canals and TM's normal, nose and mouth without ulcers or lesions, oropharynx clear and oral mucous membranes moist  NECK: no adenopathy, no asymmetry, masses, or scars and thyroid normal to palpation  RESP: lungs clear to auscultation - no rales, rhonchi or wheezes  RESP: diminished BS at LLL  BREAST: normal without masses, tenderness or nipple discharge and no palpable axillary masses or adenopathy  CV: regular rate and rhythm, normal S1 S2, no S3 or S4, no murmur, click or rub, no peripheral edema and peripheral pulses strong  ABDOMEN: soft, nontender, no hepatosplenomegaly, no masses and bowel sounds normal  MS: no musculoskeletal defects are noted and gait is age appropriate without ataxia   Dejenerative joint arthritis changes    Bilateral TKA. . .   SKIN: no suspicious lesions or rashes  NEURO: Normal strength and tone, sensory exam grossly normal, mentation intact and speech " normal  PSYCH: mentation appears normal and affect normal/bright    Diagnostic Test Results:  Labs reviewed in Epic  Results for orders placed or performed in visit on 08/21/20 (from the past 24 hour(s))   CBC with platelets   Result Value Ref Range    WBC 7.7 4.0 - 11.0 10e9/L    RBC Count 4.20 3.8 - 5.2 10e12/L    Hemoglobin 12.5 11.7 - 15.7 g/dL    Hematocrit 38.2 35.0 - 47.0 %    MCV 91 78 - 100 fl    MCH 29.8 26.5 - 33.0 pg    MCHC 32.7 31.5 - 36.5 g/dL    RDW 14.2 10.0 - 15.0 %    Platelet Count 251 150 - 450 10e9/L     TSH  FLP  BMP are pending.     CXR shows no change from 2012:  right upper lobe ill-defined  atelectasis or scarring and bronchiectasis in the lingula, with  associated atelectasis. No new consolidation. No large effusions or  pneumothorax. Unchanged cardiac size. Aortic atherosclerosis.  Degenerative changes and scoliosis in the visualized spine. Diffuse  demineralization in the bones.    ASSESSMENT / PLAN:       ICD-10-CM    1. Encounter for general adult medical examination with abnormal findings  Z00.01    2. Bronchiectasis without complication (H)  J47.9 OFFICE/OUTPT VISIT,EST,LEVL IV   3. Hypertension goal BP (blood pressure) < 150/90  I10 Basic metabolic panel     metoprolol tartrate (LOPRESSOR) 50 MG tablet     OFFICE/OUTPT VISIT,EST,LEVL IV   4. Hypothyroidism due to acquired atrophy of thyroid  E03.4 TSH with free T4 reflex     OFFICE/OUTPT VISIT,EST,LEVL IV   5. Coronary artery disease involving native coronary artery of native heart, angina presence unspecified  I25.10 metoprolol tartrate (LOPRESSOR) 50 MG tablet   6. Encounter for monitoring antiplatelet therapy  Z51.81 CBC with platelets    Z79.02    7. Hyperlipidemia LDL goal <130  E78.5 Lipid panel reflex to direct LDL Fasting   8. Other idiopathic scoliosis, thoracolumbar region  M41.25    9. Encounter for Medicare annual wellness exam  Z00.00    10. QUINTEROS (dyspnea on exertion)  R06.00 XR Chest 2 Views     OFFICE/OUTPT  VISIT,EST,LEVL IV   11. Asymptomatic postmenopausal status  Z78.0 DEXA HIP/PELVIS/SPINE - Future   12. Encounter for screening mammogram for breast cancer  Z12.31 *MA Screening Digital Bilateral       Will check cxr.   QUINTEROS is improving with exercise, though.  Expect to see her LLL bronchiectasis    Cardiac work up recently done and she is treated with imdur    Consider reclast for osteoporosis.  She has severe GERD issues.      Some of her BP have been very low.      Will reduce metoprolol from 50 am- 25 pm to 25 bid...     COUNSELING:  Reviewed preventive health counseling, as reflected in patient instructions       Regular exercise    Estimated body mass index is 24.61 kg/m  as calculated from the following:    Height as of 5/31/19: 1.524 m (5').    Weight as of 5/31/19: 57.2 kg (126 lb).         reports that she quit smoking about 45 years ago. Her smoking use included cigarettes. She has never used smokeless tobacco.      Appropriate preventive services were discussed with this patient, including applicable screening as appropriate for cardiovascular disease, diabetes, osteopenia/osteoporosis, and glaucoma.  As appropriate for age/gender, discussed screening for colorectal cancer, prostate cancer, breast cancer, and cervical cancer. Checklist reviewing preventive services available has been given to the patient.    Reviewed patients plan of care and provided an AVS. The Basic Care Plan (routine screening as documented in Health Maintenance) for Ingrid meets the Care Plan requirement. This Care Plan has been established and reviewed with the Patient.    Counseling Resources:  ATP IV Guidelines  Pooled Cohorts Equation Calculator  Breast Cancer Risk Calculator  FRAX Risk Assessment  ICSI Preventive Guidelines  Dietary Guidelines for Americans, 2010  USDA's MyPlate  ASA Prophylaxis  Lung CA Screening    Radha Sheets MD  [facility]    Identified Health Risks:

## 2020-08-21 NOTE — RESULT ENCOUNTER NOTE
Ingrid Culver    Your chest xray is abnormal, but unchanged since 2012.  The abnormality is due to the bronchiectasis.    Sincerely,       JOSE ANGEL MUSA M.D.

## 2020-08-23 NOTE — RESULT ENCOUNTER NOTE
Ingrid Culver    Enclosed are your results.  Your labs are normal/stable at this time. Blood count, electrolytes, kidney function, cholesterol and thyroid look great    Please call  with any questions.  We can also discuss any questions regarding these labs at your next scheduled visit.    Sincerely,    Radha Sheets M.D.

## 2020-10-02 ENCOUNTER — ANCILLARY PROCEDURE (OUTPATIENT)
Dept: BONE DENSITY | Facility: CLINIC | Age: 85
End: 2020-10-02
Attending: INTERNAL MEDICINE
Payer: COMMERCIAL

## 2020-10-02 DIAGNOSIS — Z78.0 ASYMPTOMATIC POSTMENOPAUSAL STATUS: ICD-10-CM

## 2020-10-02 PROCEDURE — 77080 DXA BONE DENSITY AXIAL: CPT | Performed by: INTERNAL MEDICINE

## 2020-10-06 NOTE — RESULT ENCOUNTER NOTE
Ingrid Culver    Your dexa scan implies high risk for fragility fracture, treatment is recommended in order to minimize risk of fragility fracture    For you, I would recommend a yearly infusion of Reclast.   I would not recommend the standard oral weekly fosamax because of your history of severe GERD symptoms.       Let me know if you are agreeable.  If you would like to discuss further about treatment options, you could set up a virtual visit with me in the clinic (per telephone or video)    Sincerely,     JOSE ANGEL MUSA M.D.

## 2020-11-09 NOTE — TELEPHONE ENCOUNTER
DATE OF SERVICE:  11/09/2020



SUBJECTIVE:  Ms. Sanches was hemodynamically stable overnight.



OBJECTIVE:  VITAL SIGNS:  Heart rate is 101, blood pressure 177/64, respiratory

rates in the 20s. 

She is back on a Cardene drip. 

LUNGS:  Clear. 

HEART:  Regular rhythm. 

ABDOMEN:  Soft and nontender. 

EXTREMITIES:  Without asymmetry. 

NEUROLOGIC:  She is opening her eyes and blinking today.



LABORATORY DATA:  Her hemoglobin is up from 6.4 to 10.  Sodium 141, potassium 
3.3,

chloride 103, bicarb 26, BUN 63, creatinine 1.9. 



IMPRESSION:  

1. COVID pneumonia.  She will need a tracheostomy and a PEG to survive this.

2. Encephalopathy, most likely related to COVID-19.  She will have an EEG and

Neurology consult today.  There is no evidence of stroke on recent CT. 

3. Hypertension, on Cardene.  She was on beta blocker prior to admission, so

metoprolol has been re-added. 







Job ID:  [id number]



[facility] TC contacted patient and informed that no paperwork has been received. Patient went to  to have the barium enema done on 06/26 and was unable to proceed as she was not cleaned out enough. Radiologist told patient that she was sending information to Dr Sheets on what test that she recommended that would need to be done at the . Routing to provider for review.

## 2020-11-10 ENCOUNTER — ANCILLARY PROCEDURE (OUTPATIENT)
Dept: GENERAL RADIOLOGY | Facility: CLINIC | Age: 85
End: 2020-11-10
Attending: INTERNAL MEDICINE
Payer: COMMERCIAL

## 2020-11-10 ENCOUNTER — OFFICE VISIT (OUTPATIENT)
Dept: FAMILY MEDICINE | Facility: CLINIC | Age: 85
End: 2020-11-10
Payer: COMMERCIAL

## 2020-11-10 VITALS
HEART RATE: 64 BPM | TEMPERATURE: 97.6 F | SYSTOLIC BLOOD PRESSURE: 145 MMHG | BODY MASS INDEX: 24.03 KG/M2 | OXYGEN SATURATION: 96 % | WEIGHT: 121 LBS | DIASTOLIC BLOOD PRESSURE: 80 MMHG

## 2020-11-10 DIAGNOSIS — F41.9 ANXIETY: ICD-10-CM

## 2020-11-10 DIAGNOSIS — I10 HYPERTENSION GOAL BP (BLOOD PRESSURE) < 150/90: ICD-10-CM

## 2020-11-10 DIAGNOSIS — I73.9 PERIPHERAL ARTERIAL DISEASE (H): ICD-10-CM

## 2020-11-10 DIAGNOSIS — R19.5 LOOSE STOOLS: Primary | ICD-10-CM

## 2020-11-10 DIAGNOSIS — R19.5 LOOSE STOOLS: ICD-10-CM

## 2020-11-10 DIAGNOSIS — E03.4 HYPOTHYROIDISM DUE TO ACQUIRED ATROPHY OF THYROID: ICD-10-CM

## 2020-11-10 DIAGNOSIS — M81.0 AGE-RELATED OSTEOPOROSIS WITHOUT CURRENT PATHOLOGICAL FRACTURE: ICD-10-CM

## 2020-11-10 PROCEDURE — 74019 RADEX ABDOMEN 2 VIEWS: CPT | Performed by: RADIOLOGY

## 2020-11-10 PROCEDURE — 99214 OFFICE O/P EST MOD 30 MIN: CPT | Performed by: INTERNAL MEDICINE

## 2020-11-10 ASSESSMENT — ANXIETY QUESTIONNAIRES
6. BECOMING EASILY ANNOYED OR IRRITABLE: NOT AT ALL
GAD7 TOTAL SCORE: 13
3. WORRYING TOO MUCH ABOUT DIFFERENT THINGS: NEARLY EVERY DAY
2. NOT BEING ABLE TO STOP OR CONTROL WORRYING: NEARLY EVERY DAY
7. FEELING AFRAID AS IF SOMETHING AWFUL MIGHT HAPPEN: MORE THAN HALF THE DAYS
1. FEELING NERVOUS, ANXIOUS, OR ON EDGE: NEARLY EVERY DAY
5. BEING SO RESTLESS THAT IT IS HARD TO SIT STILL: NOT AT ALL

## 2020-11-10 ASSESSMENT — PATIENT HEALTH QUESTIONNAIRE - PHQ9
5. POOR APPETITE OR OVEREATING: MORE THAN HALF THE DAYS
SUM OF ALL RESPONSES TO PHQ QUESTIONS 1-9: 10

## 2020-11-10 NOTE — PROGRESS NOTES
"Subjective     Ingrid Culver is a 88 year old female who presents to clinic today for the following health issues:    HPI   87 y/o F H/o Bronchiectasis (azithromycin 3X/wk), HTN, GERD, Hypothyroid, CAD (RCA Stent 12/2017, [place] [facility]   Sees pulmonologist yearly.         Cardiology started Imdur last year due to suspected angina... Cardiac MRI revealed some small area of cardiac ischemia and Imdur added.       Labs in 8/2020 (<3 Mo ago) show normal BMP, TSH, CBC.   Colonoscopy 2021 showed melanosis coli and two small polyps. Counseled to change bowel regimen to fiber and stool softeners.     Using magnesium  Docusate  Miralax     Weight is stable in 120's since 2017         Diarrhea  Onset/Duration: 3 weeks   Description:       Consistency of stool: loose       Blood in stool: no       Number of loose stools past 24 hours: 1  Progression of Symptoms: improving  Accompanying signs and symptoms:       Fever: no       Nausea/Vomiting: YES-nausea       Abdominal pain: YES       Weight loss: YES       Episodes of constipation: no  History   Ill contacts: no  Recent use of antibiotics: YES-but has been for years  Recent travels: no  Recent medication-new or changes(Rx or OTC): no  Precipitating or alleviating factors: None  Therapies tried and outcome: nothing     GI symptoms:   Has improved past week, 75%  When it started, she stopped the miralax, stool softeners, metamucil... never stopped magnesium  Of note, she takes magnesium.... increased this to 400 mg about a couple weeks ago.   In addition she describes some abdominal discomfort after eating.  Describes early satiety and a feeling of \"band around stomach\".    No fevers/chills .      Anxiety Follow-Up    How are you doing with your anxiety since your last visit? Worsened     Are you having other symptoms that might be associated with anxiety? Yes:  no  motivation, tired, worrying a lot    Have you had a significant life event? No     Are you feeling " depressed? No    Do you have any concerns with your use of alcohol or other drugs? No     Trigger is COVID restriction... upcoming holiday planning.   Politics...       ALSO:  Having more back pain (LBP) affecting her mobility. For example, while making self a meal has to  Stop and rest.   Tylenol helps.     Social History     Tobacco Use     Smoking status: Former Smoker     Types: Cigarettes     Quit date: 1974     Years since quittin.0     Smokeless tobacco: Never Used   Substance Use Topics     Alcohol use: Yes     Comment: occasional glass of wine     Drug use: No     No flowsheet data found.  PHQ 2019   PHQ-9 Total Score 6   Q9: Thoughts of better off dead/self-harm past 2 weeks Not at all         How many servings of fruits and vegetables do you eat daily?  2-3    On average, how many sweetened beverages do you drink each day (Examples: soda, juice, sweet tea, etc.  Do NOT count diet or artificially sweetened beverages)?   0    How many days per week do you exercise enough to make your heart beat faster? 3 or less    How many minutes a day do you exercise enough to make your heart beat faster? 10 - 19    How many days per week do you miss taking your medication? 0      Review of Systems   Constitutional, HEENT, cardiovascular, pulmonary, gi and gu systems are negative, except as otherwise noted.      Objective    BP (!) 145/80 (BP Location: Right arm, Patient Position: Sitting, Cuff Size: Adult Regular)   Pulse 64   Temp 97.6  F (36.4  C) (Oral)   Wt 54.9 kg (121 lb)   SpO2 96%   BMI 24.03 kg/m    Body mass index is 24.03 kg/m .  Physical Exam   GENERAL: alert, no distress, frail and elderly  EYES: Eyes grossly normal to inspection, PERRL and conjunctivae and sclerae normal  RESP: lungs clear to auscultation - no rales, rhonchi or wheezes  CV: regular rate and rhythm, normal S1 S2, no S3 or S4, no murmur, click or rub, no peripheral edema and peripheral pulses strong  ABDOMEN: soft,  nontender, no hepatosplenomegaly, no masses and bowel sounds normal  MS: no gross musculoskeletal defects noted, no edema   Scoliosis.    MS: dejenerative joint arthritis changes seen on hands.   SKIN: no suspicious lesions or rashes  NEURO: Normal strength and tone for advanced age, mentation intact and speech normal. No tremor  PSYCH: mentation appears normal, affect normal/ appropriate    No results found for this or any previous visit (from the past 24 hour(s)).  Recent labs reviewed in Epic    Xray - NOT done, accidentally ordered as future...         Assessment & Plan     Ingrid was seen today for diarrhea and anxiety.    Diagnoses and all orders for this visit:    Loose stools  -     Clostridium difficile Toxin B PCR; Future  -     XR Abdomen 2 Views; Future    Peripheral arterial disease (H)    Hypertension goal BP (blood pressure) < 150/90    Hypothyroidism due to acquired atrophy of thyroid    Anxiety    Age-related osteoporosis without current pathological fracture      Patient Instructions   Consider Reclast, IV infusions for thin bones.  Unable to decide today.  Has GERD so no oral med    BRAT diet (bananas rice applesauce toast)  Modify your bowel regimen, consider reducing the magnesium supplement... I think she has symptoms due to her bowel regimen plus self increasing magnesium supplement (her remedy for leg cramps).  I don;t thing this is due to PVD, will watch symptoms and weight ....     Stool sample for C diff. Is on chronic Abx.     Return to clinic one month for follow up.  -- GI issues, anxiety, check weight, discuss Reclast.             She does not want to treat anxiety, has recognized triggers and reports good support and is coping.   Will see her soon and reassess    Depression Screening Follow Up    PHQ 11/10/2020   PHQ-9 Total Score 10   Q9: Thoughts of better off dead/self-harm past 2 weeks Not at all    Follow Up Actions Taken          Return in about 4 weeks (around  12/8/2020).    Radha Sheets MD  [facility]

## 2020-11-10 NOTE — PATIENT INSTRUCTIONS
Consider Reclast, IV infusions for thin bones.      BRAT diet (bananas rice applesauce toast)  Modify your bowel regimen, consider reducing the magnesium supplement...     Stool sample for C diff.     Return to clinic one month for follow up.  -- GI issues, anxiety, check weight, discuss Reclast.

## 2020-11-11 ASSESSMENT — ANXIETY QUESTIONNAIRES: GAD7 TOTAL SCORE: 13

## 2020-11-12 NOTE — RESULT ENCOUNTER NOTE
Ingrid Culver    Abdominal film looks normal/negative.     Sincerely,     JOSE ANGEL MUSA M.D.     Send note

## 2021-02-05 ENCOUNTER — IMMUNIZATION (OUTPATIENT)
Dept: NURSING | Facility: CLINIC | Age: 86
End: 2021-02-05
Payer: COMMERCIAL

## 2021-02-05 PROCEDURE — 0001A PR COVID VAC PFIZER DIL RECON 30 MCG/0.3 ML IM: CPT

## 2021-02-05 PROCEDURE — 91300 PR COVID VAC PFIZER DIL RECON 30 MCG/0.3 ML IM: CPT

## 2021-02-26 ENCOUNTER — IMMUNIZATION (OUTPATIENT)
Dept: NURSING | Facility: CLINIC | Age: 86
End: 2021-02-26
Attending: FAMILY MEDICINE
Payer: COMMERCIAL

## 2021-02-26 PROCEDURE — 0002A PR COVID VAC PFIZER DIL RECON 30 MCG/0.3 ML IM: CPT

## 2021-02-26 PROCEDURE — 91300 PR COVID VAC PFIZER DIL RECON 30 MCG/0.3 ML IM: CPT

## 2021-03-18 DIAGNOSIS — E03.4 HYPOTHYROIDISM DUE TO ACQUIRED ATROPHY OF THYROID: ICD-10-CM

## 2021-03-19 RX ORDER — LEVOTHYROXINE SODIUM 25 UG/1
25 TABLET ORAL DAILY
Qty: 90 TABLET | Refills: 2 | Status: SHIPPED | OUTPATIENT
Start: 2021-03-19 | End: 2021-11-19

## 2021-07-12 ENCOUNTER — NURSE TRIAGE (OUTPATIENT)
Dept: NURSING | Facility: CLINIC | Age: 86
End: 2021-07-12

## 2021-07-13 NOTE — TELEPHONE ENCOUNTER
"Call received from daughterLoni  Verbal consent from pt to speak with daughter    Conversation was initially with pt and then changed to speaking with daughter, Loni Quintero reports that she has a history of shortness of breath \"\"for years\"    Today, ~4 pm, it was worse -- more difficult time breathing -- while she was up, preparing dinner. She took out the trash and then decided that she needed to rest. Her breathing is now improved and back to what she feels is normal for her    ~5 pm - BP  was high than normal =  148/83  6:15 pm - BP decreased to baseline =  108/71 P = 83      6:30 - Nebulizer treatment and pulmonary vest    SaO2 = 92%-93%  Normally around 96%-97%    Her pulmonologist is with [facility]  Her cardiologist is with [facility]    Advised to see PCP within 2-3 days.  Care Advice reviewed    Transferred to scheduling    COVID 19 Nurse Triage Plan/Patient Instructions    Please be aware that novel coronavirus (COVID-19) may be circulating in the community. If you develop symptoms such as fever, cough, or SOB or if you have concerns about the presence of another infection including coronavirus (COVID-19), please contact your health care provider or visit [email or web address].     Disposition/Instructions    In-Person Visit with provider recommended. Reference Visit Selection Guide.    Thank you for taking steps to prevent the spread of this virus.  o Limit your contact with others.  o Wear a simple mask to cover your cough.  o Wash your hands well and often.    Resources    [facility]: About COVID-19: [email or web address]    CDC: What to Do If You're Sick: www.cdc.gov/coronavirus/2019-ncov/about/steps-when-sick.html    CDC: Ending Home Isolation: www.cdc.gov/coronavirus/2019-ncov/hcp/disposition-in-home-patients.html     CDC: Caring for Someone: www.cdc.gov/coronavirus/2019-ncov/if-you-are-sick/care-for-someone.html     DG: Interim " "Guidance for Hospital Discharge to Home: www.health.[facility].[place]/diseases/coronavirus/hcp/hospdischarge.pdf    [facility] clinical trials (COVID-19 research studies): [email or web address]-trials     Below are the COVID-19 hotlines at the [place] [facility] ([facility]). Interpreters are available.   o For health questions: Call 600-084-4155 or 1-244.346.4131 (7 a.m. to 7 p.m.)  o For questions about schools and childcare: Call 093-599-4418 or 1-644.270.8334 (7 a.m. to 7 p.m.)     Maral Argueta RN  [facility]      Reason for Disposition    [1] MODERATE longstanding difficulty breathing (e.g., speaks in phrases, SOB even at rest, pulse 100-120) AND [2] SAME as normal    Additional Information    Negative: [1] Breathing stopped AND [2] hasn't returned    Negative: Choking on something    Negative: Severe difficulty breathing (e.g., struggling for each breath, speaks in single words)    Negative: Bluish (or gray) lips or face now    Negative: Difficult to awaken or acting confused (e.g., disoriented, slurred speech)    Negative: Passed out (i.e., lost consciousness, collapsed and was not responding)    Negative: Wheezing started suddenly after medicine, an allergic food or bee sting    Negative: Stridor    Negative: Slow, shallow and weak breathing    Negative: Sounds like a life-threatening emergency to the triager    Negative: [1] MODERATE difficulty breathing (e.g., speaks in phrases, SOB even at rest, pulse 100-120) AND [2] NEW-onset or WORSE than normal    Negative: Wheezing can be heard across the room    Negative: Drooling or spitting out saliva (because can't swallow)    Negative: History of prior \"blood clot\" in leg or lungs (i.e., deep vein thrombosis, pulmonary embolism)    Negative: History of inherited increased risk of blood clots (e.g., Factor 5 Leiden, Anti-thrombin 3, Protein C or Protein S deficiency, Prothrombin mutation)    Negative: Major " "surgery in the past month    Negative: Hip or leg fracture (broken bone) in past month (or had cast on leg or ankle in past month)    Negative: Illness requiring prolonged bedrest in past month (e.g., immobilization, long hospital stay)    Negative: Long-distance travel in past month (e.g., car, bus, train, plane; with trip lasting 6 or more hours)    Negative: Extra heart beats OR irregular heart beating   (i.e., \"palpitations\")    Negative: Fever > 103 F (39.4 C)    Negative: [1] Fever > 101 F (38.3 C) AND [2] age > 60    Negative: [1] Fever > 100.0 F (37.8 C) AND [2] bedridden (e.g., nursing home patient, CVA, chronic illness, recovering from surgery)    Negative: [1] Fever > 100.0 F (37.8 C) AND [2] diabetes mellitus or weak immune system (e.g., HIV positive, cancer chemo, splenectomy, organ transplant, chronic steroids)    Negative: [1] Periods where breathing stops and then resumes normally AND [2] bedridden (e.g., nursing home patient, CVA)    Negative: Pregnant or postpartum (< 1 month since delivery)    Negative: Patient sounds very sick or weak to the triager    Negative: [1] MILD difficulty breathing (e.g., minimal/no SOB at rest, SOB with walking, pulse <100) AND [2] NEW-onset or WORSE than normal    Negative: [1] Longstanding difficulty breathing (e.g., CHF, COPD, emphysema) AND [2] WORSE than normal    Negative: [1] Longstanding difficulty breathing AND [2] not responding to usual therapy    Negative: [1] Continuous (nonstop) coughing AND [2] keeps from working or sleeping    Protocols used: BREATHING DIFFICULTY-A-AH      "

## 2021-07-14 ENCOUNTER — OFFICE VISIT (OUTPATIENT)
Dept: FAMILY MEDICINE | Facility: CLINIC | Age: 86
End: 2021-07-14
Payer: COMMERCIAL

## 2021-07-14 ENCOUNTER — ANCILLARY PROCEDURE (OUTPATIENT)
Dept: GENERAL RADIOLOGY | Facility: CLINIC | Age: 86
End: 2021-07-14
Attending: PHYSICIAN ASSISTANT
Payer: COMMERCIAL

## 2021-07-14 VITALS
TEMPERATURE: 97.7 F | SYSTOLIC BLOOD PRESSURE: 156 MMHG | OXYGEN SATURATION: 96 % | WEIGHT: 122 LBS | DIASTOLIC BLOOD PRESSURE: 70 MMHG | HEART RATE: 75 BPM | HEIGHT: 60 IN | BODY MASS INDEX: 23.95 KG/M2

## 2021-07-14 DIAGNOSIS — J47.1 BRONCHIECTASIS WITH ACUTE EXACERBATION (H): Primary | ICD-10-CM

## 2021-07-14 DIAGNOSIS — J44.9 CHRONIC OBSTRUCTIVE PULMONARY DISEASE, UNSPECIFIED COPD TYPE (H): ICD-10-CM

## 2021-07-14 DIAGNOSIS — J47.1 BRONCHIECTASIS WITH ACUTE EXACERBATION (H): ICD-10-CM

## 2021-07-14 PROCEDURE — 99214 OFFICE O/P EST MOD 30 MIN: CPT | Performed by: PHYSICIAN ASSISTANT

## 2021-07-14 PROCEDURE — 71046 X-RAY EXAM CHEST 2 VIEWS: CPT | Performed by: RADIOLOGY

## 2021-07-14 RX ORDER — METHYLPREDNISOLONE 4 MG
TABLET, DOSE PACK ORAL
Qty: 21 TABLET | Refills: 0 | Status: SHIPPED | OUTPATIENT
Start: 2021-07-14 | End: 2021-12-08

## 2021-07-14 ASSESSMENT — MIFFLIN-ST. JEOR: SCORE: 896.95

## 2021-07-14 NOTE — PROGRESS NOTES
"    Assessment & Plan     Bronchiectasis with acute exacerbation (H)  - XR Chest 2 Views; Future  - methylPREDNISolone (MEDROL DOSEPAK) 4 MG tablet therapy pack; Follow Package Directions    Chronic obstructive pulmonary disease, unspecified COPD type (H)  - XR Chest 2 Views; Future  - follow up per Pulmonology recommendations.         Return in about 1 week (around 7/21/2021), or if symptoms worsen or fail to improve, for Follow up, in person.    [facility] PB Quintero is a 88 year old who presents for the following health issues  accompanied by her daughter Loni:    HPI     Patient presents with:  Shortness of Breath: having SOB ongoing 2-3 days      Patient notes that she has been nebbing twice daily although the recommendations say she can go up to every 6 hours.  Patient notes that she has chronic sx.  Pulmonology follow up is pending in 2 months.  No fever.  No sputum currently.    Review of Systems   Constitutional, HEENT, cardiovascular, pulmonary, gi and gu systems are negative, except as otherwise noted.      Objective    BP (!) 156/70   Pulse 75   Temp 97.7  F (36.5  C) (Oral)   Ht 1.511 m (4' 11.5\")   Wt 55.3 kg (122 lb)   SpO2 96%   BMI 24.23 kg/m    Body mass index is 24.23 kg/m .  Physical Exam   GENERAL: healthy, alert and no distress  NECK: no adenopathy, no asymmetry, masses, or scars and thyroid normal to palpation  RESP: decreased breath sounds throughout and bronchial BS noted R>L  MS: no gross musculoskeletal defects noted, no edema  SKIN: no suspicious lesions or rashes                "

## 2021-09-01 ENCOUNTER — OFFICE VISIT (OUTPATIENT)
Dept: OPHTHALMOLOGY | Facility: CLINIC | Age: 86
End: 2021-09-01
Payer: COMMERCIAL

## 2021-09-01 DIAGNOSIS — H43.813 POSTERIOR VITREOUS DETACHMENT OF BOTH EYES: ICD-10-CM

## 2021-09-01 DIAGNOSIS — Z01.00 EXAMINATION OF EYES AND VISION: Primary | ICD-10-CM

## 2021-09-01 DIAGNOSIS — H52.4 MYOPIA OF BOTH EYES WITH REGULAR ASTIGMATISM AND PRESBYOPIA: ICD-10-CM

## 2021-09-01 DIAGNOSIS — H04.123 DRY EYES, BILATERAL: ICD-10-CM

## 2021-09-01 DIAGNOSIS — H26.493 BILATERAL POSTERIOR CAPSULAR OPACIFICATION: ICD-10-CM

## 2021-09-01 DIAGNOSIS — Z96.1 PSEUDOPHAKIA: ICD-10-CM

## 2021-09-01 DIAGNOSIS — H52.13 MYOPIA OF BOTH EYES WITH REGULAR ASTIGMATISM AND PRESBYOPIA: ICD-10-CM

## 2021-09-01 DIAGNOSIS — H50.00 ESOTROPIA: ICD-10-CM

## 2021-09-01 DIAGNOSIS — H52.223 MYOPIA OF BOTH EYES WITH REGULAR ASTIGMATISM AND PRESBYOPIA: ICD-10-CM

## 2021-09-01 PROCEDURE — 92015 DETERMINE REFRACTIVE STATE: CPT | Performed by: STUDENT IN AN ORGANIZED HEALTH CARE EDUCATION/TRAINING PROGRAM

## 2021-09-01 PROCEDURE — 92014 COMPRE OPH EXAM EST PT 1/>: CPT | Performed by: STUDENT IN AN ORGANIZED HEALTH CARE EDUCATION/TRAINING PROGRAM

## 2021-09-01 ASSESSMENT — REFRACTION_MANIFEST
OD_AXIS: 155
OS_CYLINDER: +1.25
OD_CYLINDER: +0.50
OS_ADD: +3.00
OS_AXIS: 180
OS_SPHERE: -1.00
OD_ADD: +3.00
OD_SPHERE: -1.50

## 2021-09-01 ASSESSMENT — CONF VISUAL FIELD
OD_NORMAL: 1
METHOD: COUNTING FINGERS
OS_NORMAL: 1

## 2021-09-01 ASSESSMENT — REFRACTION_WEARINGRX
OS_AXIS: 180
SPECS_TYPE: PAL
OD_ADD: +3.00
OS_CYLINDER: +1.00
OD_AXIS: 153
OD_SPHERE: -1.25
OS_ADD: +3.00
OS_SPHERE: -0.75
OD_VPRISM: 2.5 BU
OD_CYLINDER: +0.50

## 2021-09-01 ASSESSMENT — SLIT LAMP EXAM - LIDS
COMMENTS: LEVATOR DEHISCENSE
COMMENTS: LEVATOR DEHISCENSE

## 2021-09-01 ASSESSMENT — VISUAL ACUITY
OS_CC: 20/30
OD_CC+: -1
METHOD: SNELLEN - LINEAR
OS_CC+: -2
CORRECTION_TYPE: GLASSES
OD_CC: 20/30

## 2021-09-01 ASSESSMENT — REFRACTION_FINALRX
OS_HPRISM: 9.5
OD_VPRISM: 2.5
OD_HPRISM: 9.5
OS_HBASE: OUT
OD_HBASE: OUT

## 2021-09-01 ASSESSMENT — EXTERNAL EXAM - RIGHT EYE: OD_EXAM: NORMAL

## 2021-09-01 ASSESSMENT — CUP TO DISC RATIO
OD_RATIO: 0.4
OS_RATIO: 0.35

## 2021-09-01 ASSESSMENT — TONOMETRY
OS_IOP_MMHG: 20
IOP_METHOD: APPLANATION
OD_IOP_MMHG: 19

## 2021-09-01 ASSESSMENT — EXTERNAL EXAM - LEFT EYE: OS_EXAM: NORMAL

## 2021-09-01 NOTE — LETTER
9/1/2021         RE: Ingrid Culver  [address] [place] [id number]        Dear Colleague,    Thank you for referring your patient, Ingrid Culver, to the [facility]. Please see a copy of my visit note below.     Current Eye Medications:  Refresh as needed both eyes      Subjective:  Complete eye exam. Vision in down little in the distance both eyes last 3 months, noticed left eye is more cloudy. Reading at near is doing fairly well, unless really small print. No having any double vision when glasses are on. No eye pain or discomfort in either eye.      Objective:  See Ophthalmology Exam.      Assessment:  Ingrid Culver is a 88 year old female who presents with:   Encounter Diagnoses   Name Primary?     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     Plan:  Continue artificial tears up to four times a day as needed     Glasses prescription given - optional to update    Porfirio Rodriguez MD  [phone]                 Again, thank you for allowing me to participate in the care of your patient.        Sincerely,        Porfirio Rodriguez MD

## 2021-09-01 NOTE — PROGRESS NOTES
Current Eye Medications:  Refresh as needed both eyes      Subjective:  Complete eye exam. Vision in down little in the distance both eyes last 3 months, noticed left eye is more cloudy. Reading at near is doing fairly well, unless really small print. No having any double vision when glasses are on. No eye pain or discomfort in either eye.      Objective:  See Ophthalmology Exam.      Assessment:  Ingrid Culver is a 88 year old female who presents with:   Encounter Diagnoses   Name Primary?     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     Plan:  Continue artificial tears up to four times a day as needed     Glasses prescription given - optional to update    Porfirio Rodriguez MD  [phone]

## 2021-09-01 NOTE — PATIENT INSTRUCTIONS
Continue artificial tears up to four times a day as needed     Glasses prescription given - optional to update    Porfirio Rodriguez MD  [phone]

## 2021-09-16 DIAGNOSIS — I25.10 CORONARY ARTERY DISEASE INVOLVING NATIVE CORONARY ARTERY OF NATIVE HEART, ANGINA PRESENCE UNSPECIFIED: ICD-10-CM

## 2021-09-16 DIAGNOSIS — I10 HYPERTENSION GOAL BP (BLOOD PRESSURE) < 150/90: ICD-10-CM

## 2021-09-20 RX ORDER — METOPROLOL TARTRATE 50 MG
25 TABLET ORAL 2 TIMES DAILY
Qty: 90 TABLET | Refills: 1 | Status: SHIPPED | OUTPATIENT
Start: 2021-09-20 | End: 2021-11-19

## 2021-09-20 NOTE — TELEPHONE ENCOUNTER
Prescription approved per [facility] Refill Protocol.   Glo Watkins RN

## 2021-10-14 ENCOUNTER — TRANSFERRED RECORDS (OUTPATIENT)
Dept: HEALTH INFORMATION MANAGEMENT | Facility: CLINIC | Age: 86
End: 2021-10-14
Payer: COMMERCIAL

## 2021-10-23 ENCOUNTER — HEALTH MAINTENANCE LETTER (OUTPATIENT)
Age: 86
End: 2021-10-23

## 2021-11-18 ASSESSMENT — ENCOUNTER SYMPTOMS
NERVOUS/ANXIOUS: 1
DYSURIA: 0
HEARTBURN: 0
JOINT SWELLING: 0
ARTHRALGIAS: 1
DIZZINESS: 1
EYE PAIN: 0
MYALGIAS: 1
DIARRHEA: 0
ABDOMINAL PAIN: 1
HEMATOCHEZIA: 0
FREQUENCY: 1
BREAST MASS: 0
HEADACHES: 0
WEAKNESS: 1
SORE THROAT: 0
HEMATURIA: 0
FEVER: 0
CONSTIPATION: 0
SHORTNESS OF BREATH: 1
NAUSEA: 0
PARESTHESIAS: 0
COUGH: 0
CHILLS: 0

## 2021-11-18 ASSESSMENT — ACTIVITIES OF DAILY LIVING (ADL): CURRENT_FUNCTION: NO ASSISTANCE NEEDED

## 2021-11-19 ENCOUNTER — OFFICE VISIT (OUTPATIENT)
Dept: INTERNAL MEDICINE | Facility: CLINIC | Age: 86
End: 2021-11-19
Payer: COMMERCIAL

## 2021-11-19 ENCOUNTER — ANCILLARY PROCEDURE (OUTPATIENT)
Dept: GENERAL RADIOLOGY | Facility: CLINIC | Age: 86
End: 2021-11-19
Attending: INTERNAL MEDICINE
Payer: COMMERCIAL

## 2021-11-19 VITALS
DIASTOLIC BLOOD PRESSURE: 69 MMHG | TEMPERATURE: 97.5 F | WEIGHT: 120.2 LBS | HEART RATE: 69 BPM | BODY MASS INDEX: 23.6 KG/M2 | HEIGHT: 60 IN | OXYGEN SATURATION: 96 % | SYSTOLIC BLOOD PRESSURE: 150 MMHG

## 2021-11-19 DIAGNOSIS — E78.5 HYPERLIPIDEMIA LDL GOAL <130: ICD-10-CM

## 2021-11-19 DIAGNOSIS — E03.4 HYPOTHYROIDISM DUE TO ACQUIRED ATROPHY OF THYROID: ICD-10-CM

## 2021-11-19 DIAGNOSIS — M54.6 CHRONIC RIGHT-SIDED THORACIC BACK PAIN: ICD-10-CM

## 2021-11-19 DIAGNOSIS — M41.25 OTHER IDIOPATHIC SCOLIOSIS, THORACOLUMBAR REGION: ICD-10-CM

## 2021-11-19 DIAGNOSIS — Z12.31 ENCOUNTER FOR SCREENING MAMMOGRAM FOR BREAST CANCER: ICD-10-CM

## 2021-11-19 DIAGNOSIS — N18.31 STAGE 3A CHRONIC KIDNEY DISEASE (H): ICD-10-CM

## 2021-11-19 DIAGNOSIS — Z79.82 ASPIRIN LONG-TERM USE: ICD-10-CM

## 2021-11-19 DIAGNOSIS — I73.9 PERIPHERAL ARTERIAL DISEASE (H): ICD-10-CM

## 2021-11-19 DIAGNOSIS — G89.29 CHRONIC RIGHT-SIDED THORACIC BACK PAIN: ICD-10-CM

## 2021-11-19 DIAGNOSIS — Z23 HIGH PRIORITY FOR 2019-NCOV VACCINE: ICD-10-CM

## 2021-11-19 DIAGNOSIS — M81.0 AGE-RELATED OSTEOPOROSIS WITHOUT CURRENT PATHOLOGICAL FRACTURE: ICD-10-CM

## 2021-11-19 DIAGNOSIS — I65.23 BILATERAL CAROTID ARTERY STENOSIS: ICD-10-CM

## 2021-11-19 DIAGNOSIS — Z00.01 ENCOUNTER FOR GENERAL ADULT MEDICAL EXAMINATION WITH ABNORMAL FINDINGS: Primary | ICD-10-CM

## 2021-11-19 DIAGNOSIS — I25.119 CORONARY ARTERY DISEASE INVOLVING NATIVE CORONARY ARTERY OF NATIVE HEART WITH ANGINA PECTORIS (H): ICD-10-CM

## 2021-11-19 DIAGNOSIS — I10 HYPERTENSION GOAL BP (BLOOD PRESSURE) < 150/90: ICD-10-CM

## 2021-11-19 LAB
ANION GAP SERPL CALCULATED.3IONS-SCNC: 4 MMOL/L (ref 3–14)
BUN SERPL-MCNC: 27 MG/DL (ref 7–30)
CALCIUM SERPL-MCNC: 9.9 MG/DL (ref 8.5–10.1)
CHLORIDE BLD-SCNC: 102 MMOL/L (ref 94–109)
CHOLEST SERPL-MCNC: 152 MG/DL
CO2 SERPL-SCNC: 30 MMOL/L (ref 20–32)
CREAT SERPL-MCNC: 0.93 MG/DL (ref 0.52–1.04)
DEPRECATED CALCIDIOL+CALCIFEROL SERPL-MC: 147 UG/L (ref 20–75)
ERYTHROCYTE [DISTWIDTH] IN BLOOD BY AUTOMATED COUNT: 14.2 % (ref 10–15)
FASTING STATUS PATIENT QL REPORTED: NORMAL
GFR SERPL CREATININE-BSD FRML MDRD: 55 ML/MIN/1.73M2
GLUCOSE BLD-MCNC: 91 MG/DL (ref 70–99)
HCT VFR BLD AUTO: 39.9 % (ref 35–47)
HDLC SERPL-MCNC: 56 MG/DL
HGB BLD-MCNC: 12.6 G/DL (ref 11.7–15.7)
LDLC SERPL CALC-MCNC: 74 MG/DL
MCH RBC QN AUTO: 29.2 PG (ref 26.5–33)
MCHC RBC AUTO-ENTMCNC: 31.6 G/DL (ref 31.5–36.5)
MCV RBC AUTO: 93 FL (ref 78–100)
NONHDLC SERPL-MCNC: 96 MG/DL
PLATELET # BLD AUTO: 235 10E3/UL (ref 150–450)
POTASSIUM BLD-SCNC: 4.9 MMOL/L (ref 3.4–5.3)
RBC # BLD AUTO: 4.31 10E6/UL (ref 3.8–5.2)
SODIUM SERPL-SCNC: 136 MMOL/L (ref 133–144)
TRIGL SERPL-MCNC: 112 MG/DL
TSH SERPL DL<=0.005 MIU/L-ACNC: 3.02 MU/L (ref 0.4–4)
WBC # BLD AUTO: 6.9 10E3/UL (ref 4–11)

## 2021-11-19 PROCEDURE — 84443 ASSAY THYROID STIM HORMONE: CPT | Performed by: INTERNAL MEDICINE

## 2021-11-19 PROCEDURE — 80048 BASIC METABOLIC PNL TOTAL CA: CPT | Performed by: INTERNAL MEDICINE

## 2021-11-19 PROCEDURE — 36415 COLL VENOUS BLD VENIPUNCTURE: CPT | Performed by: INTERNAL MEDICINE

## 2021-11-19 PROCEDURE — 91300 COVID-19,PF,PFIZER (12+ YRS): CPT | Performed by: INTERNAL MEDICINE

## 2021-11-19 PROCEDURE — 99213 OFFICE O/P EST LOW 20 MIN: CPT | Mod: 25 | Performed by: INTERNAL MEDICINE

## 2021-11-19 PROCEDURE — 80061 LIPID PANEL: CPT | Performed by: INTERNAL MEDICINE

## 2021-11-19 PROCEDURE — 72080 X-RAY EXAM THORACOLMB 2/> VW: CPT | Performed by: RADIOLOGY

## 2021-11-19 PROCEDURE — 82306 VITAMIN D 25 HYDROXY: CPT | Performed by: INTERNAL MEDICINE

## 2021-11-19 PROCEDURE — 0004A COVID-19,PF,PFIZER (12+ YRS): CPT | Performed by: INTERNAL MEDICINE

## 2021-11-19 PROCEDURE — 99397 PER PM REEVAL EST PAT 65+ YR: CPT | Mod: 25 | Performed by: INTERNAL MEDICINE

## 2021-11-19 PROCEDURE — 85027 COMPLETE CBC AUTOMATED: CPT | Performed by: INTERNAL MEDICINE

## 2021-11-19 RX ORDER — ALBUTEROL SULFATE 0.83 MG/ML
2.5 SOLUTION RESPIRATORY (INHALATION)
Status: CANCELLED | OUTPATIENT
Start: 2021-11-19

## 2021-11-19 RX ORDER — DIPHENHYDRAMINE HYDROCHLORIDE 50 MG/ML
50 INJECTION INTRAMUSCULAR; INTRAVENOUS
Status: CANCELLED
Start: 2021-11-19

## 2021-11-19 RX ORDER — MEPERIDINE HYDROCHLORIDE 25 MG/ML
25 INJECTION INTRAMUSCULAR; INTRAVENOUS; SUBCUTANEOUS EVERY 30 MIN PRN
Status: CANCELLED | OUTPATIENT
Start: 2021-11-19

## 2021-11-19 RX ORDER — METOPROLOL TARTRATE 50 MG
25 TABLET ORAL 2 TIMES DAILY
Qty: 90 TABLET | Refills: 3 | Status: SHIPPED | OUTPATIENT
Start: 2021-11-19 | End: 2022-05-13

## 2021-11-19 RX ORDER — METHYLPREDNISOLONE SODIUM SUCCINATE 125 MG/2ML
125 INJECTION, POWDER, LYOPHILIZED, FOR SOLUTION INTRAMUSCULAR; INTRAVENOUS
Status: CANCELLED
Start: 2021-11-19

## 2021-11-19 RX ORDER — LEVOTHYROXINE SODIUM 25 UG/1
25 TABLET ORAL DAILY
Qty: 90 TABLET | Refills: 3 | Status: SHIPPED | OUTPATIENT
Start: 2021-11-19 | End: 2023-07-18

## 2021-11-19 RX ORDER — EPINEPHRINE 1 MG/ML
0.3 INJECTION, SOLUTION, CONCENTRATE INTRAVENOUS EVERY 5 MIN PRN
Status: CANCELLED | OUTPATIENT
Start: 2021-11-19

## 2021-11-19 RX ORDER — ZOLEDRONIC ACID 5 MG/100ML
5 INJECTION, SOLUTION INTRAVENOUS ONCE
Status: CANCELLED
Start: 2021-11-19 | End: 2021-11-19

## 2021-11-19 RX ORDER — HEPARIN SODIUM,PORCINE 10 UNIT/ML
5 VIAL (ML) INTRAVENOUS
Status: CANCELLED | OUTPATIENT
Start: 2021-11-19

## 2021-11-19 RX ORDER — HEPARIN SODIUM (PORCINE) LOCK FLUSH IV SOLN 100 UNIT/ML 100 UNIT/ML
5 SOLUTION INTRAVENOUS
Status: CANCELLED | OUTPATIENT
Start: 2021-11-19

## 2021-11-19 RX ORDER — ALBUTEROL SULFATE 90 UG/1
1-2 AEROSOL, METERED RESPIRATORY (INHALATION)
Status: CANCELLED
Start: 2021-11-19

## 2021-11-19 RX ORDER — NALOXONE HYDROCHLORIDE 0.4 MG/ML
0.2 INJECTION, SOLUTION INTRAMUSCULAR; INTRAVENOUS; SUBCUTANEOUS
Status: CANCELLED | OUTPATIENT
Start: 2021-11-19

## 2021-11-19 RX ORDER — ROSUVASTATIN CALCIUM 10 MG/1
10 TABLET, COATED ORAL DAILY
Qty: 90 TABLET | Refills: 3 | Status: SHIPPED | OUTPATIENT
Start: 2021-11-19

## 2021-11-19 ASSESSMENT — ENCOUNTER SYMPTOMS
FEVER: 0
DIZZINESS: 1
JOINT SWELLING: 0
ABDOMINAL PAIN: 1
DYSURIA: 0
COUGH: 0
BREAST MASS: 0
PARESTHESIAS: 0
ARTHRALGIAS: 1
HEMATURIA: 0
EYE PAIN: 0
FREQUENCY: 1
CONSTIPATION: 0
MYALGIAS: 1
HEMATOCHEZIA: 0
WEAKNESS: 1
HEARTBURN: 0
HEADACHES: 0
DIARRHEA: 0
CHILLS: 0
SHORTNESS OF BREATH: 1
NERVOUS/ANXIOUS: 1
SORE THROAT: 0
NAUSEA: 0

## 2021-11-19 ASSESSMENT — MIFFLIN-ST. JEOR: SCORE: 889.23

## 2021-11-19 ASSESSMENT — ACTIVITIES OF DAILY LIVING (ADL): CURRENT_FUNCTION: NO ASSISTANCE NEEDED

## 2021-11-19 ASSESSMENT — PATIENT HEALTH QUESTIONNAIRE - PHQ9: SUM OF ALL RESPONSES TO PHQ QUESTIONS 1-9: 6

## 2021-11-19 NOTE — PROGRESS NOTES
"SUBJECTIVE:   Ingrid Culver is a 89 year old female who presents for Preventive Visit.    88 y/o F here for AFE.   Lives independently in [facility], son is there and helps (he works nights).    H/o Bronchiectasis (azithromycin 3X/wk, duonebs bid and VEST), HTN, GERD, Hypothyroid, CAD (RCA Stent 12/2017, follows with jail), SHAHZAD (mild-mod, no longer imaging).   Sees pulmonologist & cardiologist yearly.           Mid back pain has been slowly escalating, radiates to  R lower anterior rib border.  With activity or standing only.  Interferes with all  ADLs.    This pain may have worsened a little since her fall over the summer. Sees chiropractor.    Poor range of motion L  Shoulder since fall, too.             Patient has been advised of split billing requirements and indicates understanding: Yes   Are you in the first 12 months of your Medicare coverage?  No    Healthy Habits:     In general, how would you rate your overall health?  Fair    Frequency of exercise:  None    Do you usually eat at least 4 servings of fruit and vegetables a day, include whole grains    & fiber and avoid regularly eating high fat or \"junk\" foods?  Yes    Taking medications regularly:  Yes    Barriers to taking medications:  None    Medication side effects:  Not applicable    Ability to successfully perform activities of daily living:  No assistance needed    Home Safety:  No safety concerns identified    Hearing Impairment:  Difficult to understand a speaker at a public meeting or [employer]ond[employer] service, difficulty understanding soft or whispered speech and difficulty understanding speech on the telephone    In the past 6 months, have you been bothered by leaking of urine? Yes    In general, how would you rate your overall mental or emotional health?  Good      PHQ-2 Total Score: 3    Additional concerns today:  Yes    Do you feel safe in your environment? Yes    Have you ever done Advance Care Planning? (For example, a Health Directive, POLST, " or a discussion with a medical provider or your loved ones about your wishes): Yes, advance care planning is on file.       Fall risk  Fallen 2 or more times in the past year?: No  Any fall with injury in the past year?: No    Cognitive Screening   1) Repeat 3 items (Leader, Season, Table)    2) Clock draw: NORMAL  3) 3 item recall: Recalls 3 objects  Results: 3 items recalled: COGNITIVE IMPAIRMENT LESS LIKELY    Mini-CogTM Copyright MACO Galloway. Licensed by the author for use in [facility]; reprinted with permission (sena@[facility]). All rights reserved.      Do you have sleep apnea, excessive snoring or daytime drowsiness?: no    Reviewed and updated as needed this visit by clinical staff  Tobacco  Allergies  Meds   Med Hx  Surg Hx  Fam Hx  Soc Hx       Reviewed and updated as needed this visit by Provider               Social History     Tobacco Use     Smoking status: Former Smoker     Packs/day: 0.00     Years: 0.00     Pack years: 0.00     Types: Cigarettes     Quit date: 1974     Years since quittin.0     Smokeless tobacco: Never Used   Substance Use Topics     Alcohol use: Yes     Comment: On special occassion         Alcohol Use 2021   Prescreen: >3 drinks/day or >7 drinks/week? No   Prescreen: >3 drinks/day or >7 drinks/week? -           -------------------------------------    Current providers sharing in care for this patient include:   Patient Care Team:  Radha Sheets MD as PCP - General  Radha Sheets MD as Assigned PCP  Porfirio Rodriguez MD as Assigned Surgical Provider    The following health maintenance items are reviewed in Epic and correct as of today:  Health Maintenance Due   Topic Date Due     COPD ACTION PLAN  Never done     ZOSTER IMMUNIZATION (3 of 3) 10/16/2020     MEDICARE ANNUAL WELLNESS VISIT  2021     BMP  2021     LIPID  2021     TSH W/FREE T4 REFLEX  2021     FALL RISK ASSESSMENT  2021     COVID-19 Vaccine (3  - Booster for Pfizer series) 2021     Lab work is in process  Labs reviewed in EPIC  BP Readings from Last 3 Encounters:   21 (!) 150/69   21 (!) 156/70   11/10/20 (!) 145/80    Wt Readings from Last 3 Encounters:   21 54.5 kg (120 lb 3.2 oz)   21 55.3 kg (122 lb)   11/10/20 54.9 kg (121 lb)                  Patient Active Problem List   Diagnosis     SHOULDER IMPINGEMENT - left     Hyperlipidemia LDL goal <130     Insomnia     Trigger finger, acquired - right ring     Trigger thumb - right     Bronchiectasis (H)     Cerebral infarction (H)     Vitreous syneresis     PVD (posterior vitreous detachment)     Pseudophakia OU     Esotropia chronic c prism     Dry eyes, bilateral     Other idiopathic scoliosis, thoracolumbar region     Ganglion cyst of finger of right hand     Hypertension goal BP (blood pressure) < 150/90     Hypothyroidism due to acquired atrophy of thyroid     Coronary artery disease involving native coronary artery of native heart, angina presence unspecified     Peripheral arterial disease (H)     COPD (chronic obstructive pulmonary disease) (H)     Past Surgical History:   Procedure Laterality Date     APPENDECTOMY       BREAST BIOPSY, RT/LT      Breat Biopsy RT/LT     C TOTAL KNEE ARTHROPLASTY  03    left poly exchange     CARDIAC SURGERY  2017    STINT     CATARACT IOL, RT/LT       COLONOSCOPY     And     diverticulosis     COLONOSCOPY  12     ENDOSCOPY  12     EXTRACAPSULAR CATARACT EXTRATION WITH INTRAOCULAR LENS IMPLANT  2000    both eyes     FLEXIBLE SIGMOIDOSCOPY  ,     JOINT REPLACEMTN, KNEE RT/LT  92    right       Social History     Tobacco Use     Smoking status: Former Smoker     Packs/day: 0.00     Years: 0.00     Pack years: 0.00     Types: Cigarettes     Quit date: 1974     Years since quittin.0     Smokeless tobacco: Never Used   Substance Use Topics     Alcohol use: Yes     Comment: On special  occassion     Family History   Problem Relation Age of Onset     Breast Cancer Mother      Arthritis Mother      Thyroid Disease Mother      Cardiovascular Father      Asthma Maternal Grandmother      Cancer Brother      Genetic Disorder Brother      Other Cancer Brother         Esophageal     Cancer Son         tonsil and tongue, bladder     Alcohol/Drug Son      Other Cancer Son      Heart Disease Daughter      Asthma Daughter      Genetic Disorder Brother      Other Cancer Brother      Glaucoma No family hx of      Macular Degeneration No family hx of          Current Outpatient Medications   Medication Sig Dispense Refill     Acetaminophen (TYLENOL PO) Take 500 mg by mouth       acyclovir (ZOVIRAX) 800 MG tablet Take 1 tablet (800 mg) by mouth 5 times daily 35 tablet 0     aspirin 81 MG tablet Take by mouth daily       azithromycin (ZITHROMAX) 250 MG tablet 1 tablet every M,W,F       Docusate Sodium (COLACE PO)        ipratropium - albuterol 0.5 mg/2.5 mg/3 mL (DUONEB) 0.5-2.5 (3) MG/3ML neb solution Take 1 vial by nebulization every 6 hours as needed for shortness of breath / dyspnea or wheezing       isosorbide dinitrate (ISORDIL) 30 MG tablet Take 30 mg by mouth daily       levothyroxine (SYNTHROID/LEVOTHROID) 25 MCG tablet Take 1 tablet (25 mcg) by mouth daily 90 tablet 2     magnesium 250 MG tablet Take 1 tablet by mouth daily       methylPREDNISolone (MEDROL DOSEPAK) 4 MG tablet therapy pack Follow Package Directions 21 tablet 0     metoprolol tartrate (LOPRESSOR) 50 MG tablet Take 0.5 tablets (25 mg) by mouth 2 times daily Take one tablet (50 mg) each morning, and take 1/2 tablet (25 mg) at night 90 tablet 1     Nitroglycerin (NITROTAB SL) Place 0.4 mg under the tongue       polyethylene glycol (MIRALAX/GLYCOLAX) Packet Take 1 packet by mouth daily       psyllium (METAMUCIL) WAFR Take 2 Wafers by mouth daily       rosuvastatin (CRESTOR) 10 MG tablet Take 1 tablet (10 mg) by mouth daily 30 tablet       TYLENOL ARTHRITIS EXT RELIEF OR as needed       Vitamin D, Cholecalciferol, 1000 UNITS CAPS Take by mouth 2 times daily       Allergies   Allergen Reactions     Zocor [Hmg-Coa-R Inhibitors]      Recent Labs   Lab Test 08/21/20  0915 05/31/19  1305 02/01/18  0927 12/02/17  0000 06/22/17  0753 04/11/16  0000 12/16/15  1051 12/11/14  0945   LDL 69 75 86   < >  --    < > 91 70   HDL 56 61 54   < >  --    < > 63 55   TRIG 128 103 172*   < >  --    < > 156* 138   ALT  --   --   --   --  22  --  32 21   CR 0.93 0.88  --    < >  --    < > 0.83 0.78   GFRESTIMATED 55* 59*  --    < >  --    < > 65 70   GFRESTBLACK 64 69  --    < >  --    < > 79 85   POTASSIUM 4.5 4.8  --    < >  --    < > 4.9 4.0   TSH 2.75 1.79  --    < >  --    < > 2.04 1.63    < > = values in this interval not displayed.           Mammogram Screening - Patient over age 75, has elected to continue with screening.  Pertinent mammograms are reviewed under the imaging tab.    Review of Systems   Constitutional: Negative for chills and fever.   HENT: Negative for congestion, ear pain, hearing loss and sore throat.    Eyes: Positive for visual disturbance. Negative for pain.   Respiratory: Positive for shortness of breath. Negative for cough.    Cardiovascular: Positive for chest pain. Negative for peripheral edema.        Note: 2 Y ago, Cardiology started Imdur due to suspected angina... Cardiac MRI revealed some small area of cardiac ischemia and Imdur added.    Gastrointestinal: Positive for abdominal pain. Negative for constipation, diarrhea, heartburn, hematochezia and nausea.   Breasts:  Negative for tenderness, breast mass and discharge.   Genitourinary: Positive for frequency and urgency. Negative for dysuria, genital sores, hematuria, pelvic pain, vaginal bleeding and vaginal discharge.   Musculoskeletal: Positive for arthralgias and myalgias. Negative for joint swelling.   Skin: Negative for rash.   Neurological: Positive for dizziness and weakness.  "Negative for headaches and paresthesias.   Psychiatric/Behavioral: Positive for mood changes. The patient is nervous/anxious.           OBJECTIVE:   BP (!) 150/69   Pulse 69   Temp 97.5  F (36.4  C) (Oral)   Ht 1.52 m (4' 11.84\")   Wt 54.5 kg (120 lb 3.2 oz)   SpO2 96%   BMI 23.60 kg/m   Estimated body mass index is 23.6 kg/m  as calculated from the following:    Height as of this encounter: 1.52 m (4' 11.84\").    Weight as of this encounter: 54.5 kg (120 lb 3.2 oz).       Physical Exam     GENERAL APPEARANCE: alert, no distress, elderly and frail  EYES: Eyes grossly normal to inspection, PERRL and conjunctivae and sclerae normal  HENT: ear canals and TM's normal, nose and mouth without ulcers or lesions, oropharynx clear and oral mucous membranes moist  NECK: no adenopathy, no asymmetry, masses, or scars and thyroid normal to palpation  RESP: lungs clear to auscultation - no rales, rhonchi or wheezes  BREAST: normal without masses, tenderness or nipple discharge and no palpable axillary masses or adenopathy  CV: regular rate and rhythm, normal S1 S2, no S3 or S4, soft 2-/6 murmur heard during expiration only, no click or rub, no peripheral edema and peripheral pulses strong  ABDOMEN: soft, nontender, no hepatosplenomegaly, no masses and bowel sounds normal  ABDOMEN: no bruits.   MS: severe scoliosis.  No pain during palpation of vertebral bodies or paraspinous processes.    SKIN: no suspicious lesions or rashes  NEURO: Normal strength and tone for age, sensory exam grossly normal, mentation intact -A&OX3 ...and speech normal  No tremor. Ambulates independently, is more limited by pain .   PSYCH: mentation appears normal and affect normal/bright    Diagnostic Test Results:  See orders    Back xray shows profound scoliosis as well as demineralization. atherosclerosis of aorta is evident.     ASSESSMENT / PLAN:   (Z00.01) Encounter for general adult medical examination with abnormal findings  (primary     "     (I10) Hypertension goal BP (blood pressure) < 150/90  Comment: home BP are 100 - 130 systolic.  No additional meds.    Plan: Basic metabolic panel, metoprolol tartrate         (LOPRESSOR) 50 MG tablet, OFFICE/OUTPT         VISIT,EST,LEVL IV             (E03.4) Hypothyroidism due to acquired atrophy of thyroid  Comment:  Recheck.  Refills.   Plan: TSH with free T4 reflex, levothyroxine         (SYNTHROID/LEVOTHROID) 25 MCG tablet,         OFFICE/OUTPT VISIT,EST,LEVL IV             (I73.9) Peripheral arterial disease (H)  Comment:    (I65.23) Bilateral carotid artery stenosis  Comment  Plan: Lipid panel reflex to direct LDL Fasting  (I25.119) Coronary artery disease involving native coronary artery of native heart with angina pectoris (H)  Comment: yearly cardiology follow up.  Has upcoming appt  Plan: Lipid panel reflex to direct LDL Fasting             (M41.25) Other idiopathic scoliosis, thoracolumbar region  Comment:  This is very severe  Would like to see if pain can evaluate for specific therapy eligibility.  I did not order MRI, as they may want to see her first. I am happy to order MRI if they deem it useful prior to her visit.  She has some claustrophobia.   Plan: Pain Management Referral, OFFICE/OUTPT         VISIT,EST,LEVL IV  (M54.6,  G89.29) Chronic right-sided thoracic back pain  Comment:  See above    The pain limits her greatly, and she can control by sitting/laying.   Plan: XR Thoracic Lumbar Standing 2 Views, Pain         Management Referral, OFFICE/OUTPT         VISIT,EST,LEVL IV           (E78.5) Hyperlipidemia LDL goal <130  Comment:   Plan: Lipid panel reflex to direct LDL Fasting,         rosuvastatin (CRESTOR) 10 MG tablet          (N18.31) Stage 3a chronic kidney disease (H)  Comment:    Plan: Albumin Random Urine Quantitative with Creat         Ratio, Basic metabolic panel, CBC with         platelets, OFFICE/OUTPT VISIT,EST,LEVL IV          (M81.0) Age-related osteoporosis without current  "pathological fracture  Comment: she is agreeable to treatment.  Oral fosamax is not a good option due to her history of severe GERD issues in past.   Plan: Vitamin D Deficiency, OFFICE/OUTPT         VISIT,BLAZE UNDERWOOD IV             (Z23) High priority for 2019-nCoV vaccine  Comment:    Plan: COVID-19,PF,PFIZER (12+ Yrs PURPLE LABEL)             (Z79.82) Aspirin long-term use  Comment:    Plan: CBC with platelets             (Z12.31) Encounter for screening mammogram for breast cancer  Comment:    Plan: *MA Screening Digital Bilateral               Patient has been advised of split billing requirements and indicates understanding: Yes  COUNSELING:  Reviewed preventive health counseling, as reflected in patient instructions       Immunizations    Vaccinated for:  Pf Booster        Estimated body mass index is 23.6 kg/m  as calculated from the following:    Height as of this encounter: 1.52 m (4' 11.84\").    Weight as of this encounter: 54.5 kg (120 lb 3.2 oz).        She reports that she quit smoking about 47 years ago. Her smoking use included cigarettes. She smoked 0.00 packs per day for 0.00 years. She has never used smokeless tobacco.      Appropriate preventive services were discussed with this patient, including applicable screening as appropriate for cardiovascular disease, diabetes, osteopenia/osteoporosis, and glaucoma.  As appropriate for age/gender, discussed screening for colorectal cancer, prostate cancer, breast cancer, and cervical cancer. Checklist reviewing preventive services available has been given to the patient.    Reviewed patients plan of care and provided an AVS. The Intermediate Care Plan ( asthma action plan, low back pain action plan, and migraine action plan) for Ingrid meets the Care Plan requirement. This Care Plan has been established and reviewed with the Patient and daughter. .    Counseling Resources:  ATP IV Guidelines  Pooled Cohorts Equation Calculator  Breast Cancer Risk " Calculator  Breast Cancer: Medication to Reduce Risk  FRAX Risk Assessment  ICSI Preventive Guidelines  Dietary Guidelines for Americans, 2010  Rayn's MyPlate  ASA Prophylaxis  Lung CA Screening    Radha Sheets MD  [facility]    Identified Health Risks:  Answers for HPI/ROS submitted by the patient on 11/18/2021  If you checked off any problems, how difficult have these problems made it for you to do your work, take care of things at home, or get along with other people?: Not difficult at all  PHQ9 TOTAL SCORE: 6

## 2021-11-19 NOTE — PATIENT INSTRUCTIONS
Call to schedule imaging      Pain referral (maybe MRI prior, vs letting them decide if appropriate)  They will call you    [facility] infusion.

## 2021-11-19 NOTE — RESULT ENCOUNTER NOTE
Ingrid Culver    Here is a copy of your xray report, as read by one of our radiologists.     Sincerely,       JOSE ANGEL MUSA M.D.

## 2021-11-22 ENCOUNTER — TELEPHONE (OUTPATIENT)
Dept: FAMILY MEDICINE | Facility: CLINIC | Age: 86
End: 2021-11-22
Payer: COMMERCIAL

## 2021-11-22 DIAGNOSIS — E67.3 HYPERVITAMINOSIS D: Primary | ICD-10-CM

## 2021-11-22 NOTE — TELEPHONE ENCOUNTER
Taking way too much.   At risk for developing kidney stones, hypercalcemia    For now, stop all vitamin D    Recheck vitamin D level in a couple of months -- once the D is in range (below 70 or so) can restart just the 1000 IU once or twice daily.

## 2021-11-22 NOTE — TELEPHONE ENCOUNTER
Patient notified of provider message as written. Patient verbalizes understanding and had no further questions.     Cheyenne Aguero RN

## 2021-11-22 NOTE — TELEPHONE ENCOUNTER
Her vitamin D level is very high.  Please ask her about all vitamin/vitamin D supplements she is on. My med list may not be reflecting it...

## 2021-11-22 NOTE — RESULT ENCOUNTER NOTE
Ingrid Culver    The vitamin D levels are way too high.  Taking way too much.   At risk for developing kidney stones, hypercalcemia, etc...     For now, stop all vitamin D.  The levels will slowly diminish over time (will be okay).     Recheck vitamin D level in a couple of months  (via Lab appointment) -- once the D is in range (below 70 or so) can restart just the 1000 IU once or twice daily.     Sincerely,       JOSE ANGEL MUSA M.D.

## 2021-11-22 NOTE — TELEPHONE ENCOUNTER
"Called patient.  She stated that she takes a \"very high dose\" of Vit D3.  She checked the label and is taking 250 mcg (95382 unites) daily.  Is also on Calcium-mg-D3, 1 tab daily which contains 5 mcg of D3   Patient stated that she will just take the Vit D 250 mcg supplement out of her pill boxes.    Please advise if this would be adequate or if she should take out the Calcium-mg-D3 out as well and purchase a separate Calcium supplement that does not contain Vit D    Octavio Bañuelos RN  [facility]      "

## 2021-12-06 ENCOUNTER — OFFICE VISIT (OUTPATIENT)
Dept: PALLIATIVE MEDICINE | Facility: CLINIC | Age: 86
End: 2021-12-06
Payer: COMMERCIAL

## 2021-12-06 VITALS — HEART RATE: 67 BPM | DIASTOLIC BLOOD PRESSURE: 77 MMHG | SYSTOLIC BLOOD PRESSURE: 135 MMHG

## 2021-12-06 DIAGNOSIS — M41.25 OTHER IDIOPATHIC SCOLIOSIS, THORACOLUMBAR REGION: ICD-10-CM

## 2021-12-06 DIAGNOSIS — M79.2 THORACIC NEURALGIA: Primary | ICD-10-CM

## 2021-12-06 DIAGNOSIS — G89.29 CHRONIC RIGHT-SIDED THORACIC BACK PAIN: ICD-10-CM

## 2021-12-06 DIAGNOSIS — M47.24 OSTEOARTHRITIS OF SPINE WITH RADICULOPATHY, THORACIC REGION: ICD-10-CM

## 2021-12-06 DIAGNOSIS — M54.6 CHRONIC RIGHT-SIDED THORACIC BACK PAIN: ICD-10-CM

## 2021-12-06 PROCEDURE — 99204 OFFICE O/P NEW MOD 45 MIN: CPT | Performed by: PAIN MEDICINE

## 2021-12-06 ASSESSMENT — PAIN SCALES - GENERAL: PAINLEVEL: NO PAIN (1)

## 2021-12-06 NOTE — PROGRESS NOTES
[facility] procedure evaluation    Date of visit: 12/6/2021    Reason for consultation:    Primary Care Provider is Radha Sheets.  Pain medications are being prescribed by n/a.    Please see the [facility] health questionnaire which the patient completed and reviewed with me in detail.  Patient is a Ingrid Culver is a 89 year old femalewho I was asked to see in consultation by Radha Sheets for evaluation of   Chief Complaint   Patient presents with     Pain   .   Chief Complaint:    Chief Complaint   Patient presents with     Pain       Pain history:  Ingrid Culver is a 89 year old female who first started having problems with pain in acute on chronic midthoracic pain  Can get worse in the setting of a fall.   The pain is chronic  Denies inciting event although has sig scoliosis  Becomng progressively worse  The pain is worse the right>left  The pain starts in low thoracic spine  The pain radiates to ant chest  The pain varies in nature  The pain varies in severity  The pain is squezzing  The pain is sharp   Denies numbness   Ting  Burning  The pain is worse standing  Worse with gardening  On sure of exact movements that hurt  The pain gets worse throughout the day  Improved with rest  Improve with back brace  Improve with chiro  The pt does her stretching with benefit  Denies rash  Denies allodynia  Hx of shingels know it was across you back   Denies weakness in her UE  Pain not playiing  A role in her sleep  Sig affects her quality of life    The pt has sig active since covid, which has made things worse  Pain rating: intensity  Averages 6/10 on a 0-10 scale.      Current treatments include:  apap    Previous medication treatments included:  no    Other treatments have included:  Ingrid Culver has been seen at a pain clinic in the past.    PT: y  Chiropractic: y  Acupuncture: n  TENs Unit: n  Injections: n    Past Medical History:  Past Medical  History:   Diagnosis Date     Allergies     idiopathic, anaphyllactic rx's     Bronchiectasis     cough on inhalers recurrent infections     Cataract      Cerebral infarction (H)      COPD (chronic obstructive pulmonary disease) (H)      Disorders of lipoid metabolism     hx of on low fat diet     Esotropia chronic c prism 11/6/2013     Heart disease      Hypertension goal BP (blood pressure) < 150/90      Knee pain      Other bone/cartilage disease      Post-Nasal Drainage      Unspecified asthma(493.90)     Asthma     Unspecified hypothyroidism      Uterine fibroid     10/08 U/s shows 4X2.9X3/3     Past Surgical History:  Past Surgical History:   Procedure Laterality Date     APPENDECTOMY       BREAST BIOPSY, RT/LT      Breat Biopsy RT/LT     C TOTAL KNEE ARTHROPLASTY  06/25/03    left poly exchange     CARDIAC SURGERY  12/03/2017    STINT     CATARACT IOL, RT/LT       COLONOSCOPY  06/00   And 2008    diverticulosis     COLONOSCOPY  1-13-12     ENDOSCOPY  1-13-12     EXTRACAPSULAR CATARACT EXTRATION WITH INTRAOCULAR LENS IMPLANT  01/2000    both eyes     FLEXIBLE SIGMOIDOSCOPY  06/95,08/98     JOINT REPLACEMTN, KNEE RT/LT  92    right     Medications:  Current Outpatient Medications   Medication Sig Dispense Refill     Acetaminophen (TYLENOL PO) Take 500 mg by mouth       acyclovir (ZOVIRAX) 800 MG tablet Take 1 tablet (800 mg) by mouth 5 times daily 35 tablet 0     aspirin 81 MG tablet Take by mouth daily       azithromycin (ZITHROMAX) 250 MG tablet 1 tablet every M,W,F       Docusate Sodium (COLACE PO)        ipratropium - albuterol 0.5 mg/2.5 mg/3 mL (DUONEB) 0.5-2.5 (3) MG/3ML neb solution Take 1 vial by nebulization every 6 hours as needed for shortness of breath / dyspnea or wheezing       isosorbide dinitrate (ISORDIL) 30 MG tablet Take 30 mg by mouth daily       levothyroxine (SYNTHROID/LEVOTHROID) 25 MCG tablet Take 1 tablet (25 mcg) by mouth daily 90 tablet 3     magnesium 250 MG tablet Take 1 tablet by  mouth daily       methylPREDNISolone (MEDROL DOSEPAK) 4 MG tablet therapy pack Follow Package Directions 21 tablet 0     metoprolol tartrate (LOPRESSOR) 50 MG tablet Take 0.5 tablets (25 mg) by mouth 2 times daily Take one tablet (50 mg) each morning, and take 1/2 tablet (25 mg) at night 90 tablet 3     Nitroglycerin (NITROTAB SL) Place 0.4 mg under the tongue       polyethylene glycol (MIRALAX/GLYCOLAX) Packet Take 1 packet by mouth daily       psyllium (METAMUCIL) WAFR Take 2 Wafers by mouth daily       rosuvastatin (CRESTOR) 10 MG tablet Take 1 tablet (10 mg) by mouth daily 90 tablet 3     TYLENOL ARTHRITIS EXT RELIEF OR as needed       Vitamin D, Cholecalciferol, 1000 UNITS CAPS Take by mouth 2 times daily       Allergies:     Allergies   Allergen Reactions     Zocor [Hmg-Coa-R Inhibitors]      Social History:    History of chemical dependency treatment: n    Family history:  Family History   Problem Relation Age of Onset     Breast Cancer Mother      Arthritis Mother      Thyroid Disease Mother      Cardiovascular Father      Asthma Maternal Grandmother      Cancer Brother      Genetic Disorder Brother      Other Cancer Brother         Esophageal     Cancer Son         tonsil and tongue, bladder     Alcohol/Drug Son      Other Cancer Son      Heart Disease Daughter      Asthma Daughter      Genetic Disorder Brother      Other Cancer Brother      Glaucoma No family hx of      Macular Degeneration No family hx of      Family history of headaches: n    Review of Systems:  Skin: negative  Eyes: negative  Ears/Nose/Throat: negative  Respiratory: No shortness of breath, dyspnea on exertion, cough, or hemoptysis  Cardiovascular: negative  Gastrointestinal: negative  Genitourinary: negative  Musculoskeletal: negative  Neurologic: negative  Psychiatric: negative  Hematologic/Lymphatic/Immunologic: negative  Endocrine: negative    Physical Exam:  Vitals:    12/06/21 1007   BP: 135/77   Pulse: 67      Exam:  Constitutional: healthy, alert and no distress  Head: normocephalic. Atraumatic.   Eyes: no redness or jaundice noted   Cardiovascular: neg jvd  Respiratory: speaking in full sentences  Gastrointestinal: soft, non-tender,  Skin: no suspicious lesions or rashes  Psychiatric: mentation appears normal and affect normal/bright    Musculoskeletal exam:  Gait/Station/Posture: wnl  Cervical spine: ROMwnl   + ttp    Thoracic spine:  Normal wnl  Neg tto[  Lumbar spine:     ROM: decreased  Dec ROM of R ighs shoulder   + ttp over biceps tendon   Neurologic exam:    Motor:  5/5 UE   Reflexes:     Biceps:     +2   Brachioradialis   +2     Triceps:  +2      Sensory:  (upper and lower extremities):   Light touch: normal    Allodynia: absent    Dysethesia: absent    Hyperalgesia: absent     Diagnostic tests:  MRI none    IMPRESSION: There is severe right convex curvature of the  thoracolumbar junction centered at T12 measuring 46 degrees between  T10 and L3. This represents significant worsening of curvature since  the comparison study. There is moderate degenerative retrolisthesis of  L1 upon L2. There is minimal degenerative anterolisthesis of L4 upon  L5. Alignment of the visualized portions of the spine is otherwise  normal. No definite fractures.  Assessment/Plan:  Ingrid Culver is a 89 year old female who presents with the complaints of mid thoracic pain radiates to her ant chest.   Ingrid was seen today for pain.    Diagnoses and all orders for this visit:    Thoracic neuralgia  -     MR Thoracic Spine w/o Contrast; Future    Other idiopathic scoliosis, thoracolumbar region  -     Pain Management Referral    Chronic right-sided thoracic back pain  -     Pain Management Referral    Osteoarthritis of spine with radiculopathy, thoracic region  -     MR Thoracic Spine w/o Contrast; Future           - Physical Therapy: continue  - Clinical Health Psychologist to address issues of relaxation, behavioral change,  coping style, and other factors important to improvement: no  - Diagnostic Studies: ordered thoracic MRI  - Medication Management:    Discussed dosage of acetaminophen  - Further procedures recommended   - consider pending results   - Follow up:    - will call with results to discuss further       Total time spent was 35 minutes, and more than 50% of face to face time was spent counseling and/or coordination of care regarding principles of multidisciplinary care and medication management     Srinath Jeff MD  [facility] [place]

## 2021-12-06 NOTE — PATIENT INSTRUCTIONS
- Physical Therapy: continue  - Clinical Health Psychologist to address issues of relaxation, behavioral change, coping style, and other factors important to improvement: no  - Diagnostic Studies: ordered thoracic MRI  - Medication Management:    Discussed dosage of acetaminophen  - Further procedures recommended   - consider pending results   - Follow up:    - will call with results to discuss further         ----------------------------------------------------------------  [facility] Number:  [phone]     Call with any questions about your care and for scheduling assistance.     Calls are returned Monday through Friday between 8 AM and 4:30 PM. We usually get back to you within 2 business days depending on the issue/request.    If we are prescribing your medications:    For opioid medication refills, call the clinic or send a Walk-in Appointment Scheduler message 7 days in advance.  Please include:    Name of requested medication    Name of the pharmacy.    For non-opioid medications, call your pharmacy directly to request a refill. Please allow 3-4 days to be processed.     Per [place] State Law:    All controlled substance prescriptions must be filled within 30 days of being written.      For those controlled substances allowing refills, pickup must occur within 30 days of last fill.      We believe regular attendance is key to your success in our program!      Any time you are unable to keep your appointment we ask that you call us at least 24 hours in advance to cancel.This will allow us to offer the appointment time to another patient.     Multiple missed appointments may lead to dismissal from the clinic.

## 2021-12-08 ENCOUNTER — INFUSION THERAPY VISIT (OUTPATIENT)
Dept: INFUSION THERAPY | Facility: CLINIC | Age: 86
End: 2021-12-08
Payer: COMMERCIAL

## 2021-12-08 VITALS
OXYGEN SATURATION: 99 % | RESPIRATION RATE: 16 BRPM | SYSTOLIC BLOOD PRESSURE: 129 MMHG | TEMPERATURE: 97.5 F | BODY MASS INDEX: 23.56 KG/M2 | DIASTOLIC BLOOD PRESSURE: 85 MMHG | HEART RATE: 66 BPM | WEIGHT: 120 LBS

## 2021-12-08 DIAGNOSIS — M81.0 AGE-RELATED OSTEOPOROSIS WITHOUT CURRENT PATHOLOGICAL FRACTURE: Primary | ICD-10-CM

## 2021-12-08 PROCEDURE — 99207 PR NO CHARGE LOS: CPT

## 2021-12-08 PROCEDURE — 96365 THER/PROPH/DIAG IV INF INIT: CPT | Performed by: INTERNAL MEDICINE

## 2021-12-08 RX ORDER — DIPHENHYDRAMINE HYDROCHLORIDE 50 MG/ML
50 INJECTION INTRAMUSCULAR; INTRAVENOUS
Status: CANCELLED
Start: 2021-12-08

## 2021-12-08 RX ORDER — MEPERIDINE HYDROCHLORIDE 25 MG/ML
25 INJECTION INTRAMUSCULAR; INTRAVENOUS; SUBCUTANEOUS EVERY 30 MIN PRN
Status: CANCELLED | OUTPATIENT
Start: 2021-12-08

## 2021-12-08 RX ORDER — EPINEPHRINE 1 MG/ML
0.3 INJECTION, SOLUTION INTRAMUSCULAR; SUBCUTANEOUS EVERY 5 MIN PRN
Status: CANCELLED | OUTPATIENT
Start: 2021-12-08

## 2021-12-08 RX ORDER — HEPARIN SODIUM,PORCINE 10 UNIT/ML
5 VIAL (ML) INTRAVENOUS
Status: CANCELLED | OUTPATIENT
Start: 2021-12-08

## 2021-12-08 RX ORDER — HEPARIN SODIUM (PORCINE) LOCK FLUSH IV SOLN 100 UNIT/ML 100 UNIT/ML
5 SOLUTION INTRAVENOUS
Status: CANCELLED | OUTPATIENT
Start: 2021-12-08

## 2021-12-08 RX ORDER — ZOLEDRONIC ACID 5 MG/100ML
5 INJECTION, SOLUTION INTRAVENOUS ONCE
Status: CANCELLED
Start: 2021-12-08 | End: 2021-12-08

## 2021-12-08 RX ORDER — NALOXONE HYDROCHLORIDE 0.4 MG/ML
0.2 INJECTION, SOLUTION INTRAMUSCULAR; INTRAVENOUS; SUBCUTANEOUS
Status: CANCELLED | OUTPATIENT
Start: 2021-12-08

## 2021-12-08 RX ORDER — ALBUTEROL SULFATE 0.83 MG/ML
2.5 SOLUTION RESPIRATORY (INHALATION)
Status: CANCELLED | OUTPATIENT
Start: 2021-12-08

## 2021-12-08 RX ORDER — METHYLPREDNISOLONE SODIUM SUCCINATE 125 MG/2ML
125 INJECTION, POWDER, LYOPHILIZED, FOR SOLUTION INTRAMUSCULAR; INTRAVENOUS
Status: CANCELLED
Start: 2021-12-08

## 2021-12-08 RX ORDER — ZOLEDRONIC ACID 5 MG/100ML
5 INJECTION, SOLUTION INTRAVENOUS ONCE
Status: COMPLETED | OUTPATIENT
Start: 2021-12-08 | End: 2021-12-08

## 2021-12-08 RX ORDER — ALBUTEROL SULFATE 90 UG/1
1-2 AEROSOL, METERED RESPIRATORY (INHALATION)
Status: CANCELLED
Start: 2021-12-08

## 2021-12-08 RX ADMIN — Medication 250 ML: at 13:52

## 2021-12-08 RX ADMIN — ZOLEDRONIC ACID 5 MG: 0.05 INJECTION, SOLUTION INTRAVENOUS at 13:53

## 2021-12-08 ASSESSMENT — PAIN SCALES - GENERAL: PAINLEVEL: NO PAIN (0)

## 2021-12-08 NOTE — PROGRESS NOTES
Infusion Nursing Note:  Ingrid Culver presents today for Reclast.    Patient seen by provider today: No   present during visit today: Not Applicable.    Note: Patient oriented to MG infusion. Reviewed Reclast information with patient. Questions answered and pt expressed understanding. Also reviewed how and when to use call light if needed.      Intravenous Access:  Peripheral IV placed.    Treatment Conditions:  Lab Results   Component Value Date     11/19/2021    POTASSIUM 4.9 11/19/2021    MAG 2.2 11/22/2010    CR 0.93 11/19/2021    CRESENCIO 9.9 11/19/2021    BILITOTAL 0.3 06/22/2017    ALBUMIN 3.6 06/22/2017    ALT 22 06/22/2017    AST 20 06/22/2017         Post Infusion Assessment:  Patient tolerated infusion without incident.  Site patent and intact, free from redness, edema or discomfort.  No evidence of extravasations.  Access discontinued per protocol.       Discharge Plan:   AVS to patient via MYCHART.  Patient will return in one year for next appointment.   Patient discharged in stable condition accompanied by: daughter drove patient today.  Departure Mode: Ambulatory.      Jade Kurtz RN

## 2021-12-09 ENCOUNTER — TELEPHONE (OUTPATIENT)
Dept: FAMILY MEDICINE | Facility: CLINIC | Age: 86
End: 2021-12-09
Payer: COMMERCIAL

## 2021-12-09 DIAGNOSIS — F40.240 CLAUSTROPHOBIA: Primary | ICD-10-CM

## 2021-12-09 RX ORDER — DIAZEPAM 10 MG
TABLET ORAL
Qty: 1 TABLET | Refills: 1 | Status: SHIPPED | OUTPATIENT
Start: 2021-12-09 | End: 2022-01-06

## 2021-12-09 NOTE — TELEPHONE ENCOUNTER
Reason for Call:  Other prescription    Detailed comments: Pt is getting an MRI on 12/15 and see needs something due to being claustrophobic.     Phone Number Patient can be reached at: Home number on file [phone] (home)    Best Time: any    Can we leave a detailed message on this number? YES    Call taken on 12/9/2021 at 12:54 PM by Gina Joseph

## 2021-12-09 NOTE — TELEPHONE ENCOUNTER
Patient notified of provider message as written. Patient verbalized understanding.     Cheyenne Aguero RN

## 2021-12-15 ENCOUNTER — ANCILLARY PROCEDURE (OUTPATIENT)
Dept: MRI IMAGING | Facility: CLINIC | Age: 86
End: 2021-12-15
Attending: PAIN MEDICINE
Payer: COMMERCIAL

## 2021-12-15 DIAGNOSIS — M47.24 OSTEOARTHRITIS OF SPINE WITH RADICULOPATHY, THORACIC REGION: ICD-10-CM

## 2021-12-15 DIAGNOSIS — M79.2 THORACIC NEURALGIA: ICD-10-CM

## 2021-12-15 PROCEDURE — 72146 MRI CHEST SPINE W/O DYE: CPT | Mod: TC | Performed by: RADIOLOGY

## 2021-12-16 ENCOUNTER — TELEPHONE (OUTPATIENT)
Dept: PALLIATIVE MEDICINE | Facility: CLINIC | Age: 86
End: 2021-12-16
Payer: COMMERCIAL

## 2021-12-16 DIAGNOSIS — M79.2 THORACIC NEURALGIA: Primary | ICD-10-CM

## 2021-12-16 NOTE — TELEPHONE ENCOUNTER
Most significant finding is that T11-12 could consider a thoracic epidural. If interested will place an order

## 2021-12-16 NOTE — TELEPHONE ENCOUNTER
"Most significant finding is that T11-12 could consider a thoracic epidural. If interested will place an order- Dr. Srinath Jeff     Clarified with Dr. Srinath Jeff problem area- moderate left T12-L1 neural foraminal stenosis. Mild to moderate Right    Able to give message below, as written by provider, she is agreeable to epidural \"anything to help\"    Orders prepped for provider review    Selma Patino RN, BSN, CMSRN  RN Care Coordinator  [facility]   "

## 2021-12-20 NOTE — TELEPHONE ENCOUNTER
Screening Questions for Radiology Injections:    Injection to be done at which interventional clinic site? [facility] [place]    Remind [place] Pt's that Covid test is no longer required except for sedation procedure Pt's.    Instruct patient to arrive as directed prior to the scheduled appointment time:    [facility] minutes before      [place]: 30 minutes before; if IV needed 1 hour before     Procedure ordered by Randee    Procedure ordered? Thoracic Epidural      Transforaminal Cervical VIVIENNE -  [facility] does NOT have providers that do these- [facility] and [facility] do have providers that do    As a reminder, receiving steroids can decrease your body's ability to fight infection.   Would you still like to move forward with scheduling the injection?  Yes    What insurance would patient like us to bill for this procedure? BCBS Medicare      Worker's comp or MVA (motor vehicle accident) -Any injection DO NOT SCHEDULE and route to Karen Lees.      [employer] insurance - For SI joint injections, DO NOT SCHEDULE and route Karen Lees.       ALL BCBS, [employer] and [employer]-Route to Karen for review DO NOT SCHEDULE      IF SCHEDULING IN [place] AND NEEDS A PA, IT IS OKAY TO SCHEDULE. [place] HANDLES THEIR OWN PA'S AFTER THE PATIENT IS SCHEDULED. PLEASE SCHEDULE AT LEAST 1 WEEK OUT SO A PA CAN BE OBTAINED.    Any chance of pregnancy? NO   If YES, do NOT schedule and route to RN pool    Is an  needed? No     Patient has a drive home? (mandatory) YES: INFORMED    Is patient taking any blood thinners (That is: Coumadin, Warfarin, Jantoven, Pradaxa Xarelto, Eliquis, Edoxaban, Enoxaparin, Lovenox, Heparin, Arixtra, Fondaparinux, or Fragmin? OR Antiplatelet medication such as Plavix, Brilinta, or Effient? )? No   If hold needed, do NOT schedule, route to RN pool     Is patient taking any aspirin products (includes Excedrin and Fiorinal)? Yes - Pt takes 81mg daily; instructed to hold 0 day(s)  prior to procedure.      If more than 325mg/day, OK to schedule; Instruct pt to decrease to less than 325 mg for 7 days AND route to RN pool    For CERVICAL procedures, hold all aspirin products for 6 days.     Tell pt that if aspirin product is not held for 6 days, the procedure WILL BE cancelled.      Does the patient have a bleeding or clotting disorder? No     If YES, okay to schedule AND route to RN nurse pool    For any patients with platelet count <100, must be forwarded to provider    Any allergies to contrast dye, iodine, shellfish, or numbing and steroid medications? No    If YES, add allergy information to appointment notes AND route to the RN pool     If VIVIENNE and Contrast Dye / Iodine Allergy? DO NOT SCHEDULE, route to RN pool    Allergies: Zocor [hmg-coa-r inhibitors]     Is patient diabetic?  Yes  If YES, instruct them to bring their glucometer.    Does patient have an active infection or treated for one within the past week? No     Is patient currently taking any antibiotics?  Yes - chronic    For patients on chronic, preventative, or prophylactic antibiotics, procedures may be scheduled.     For patients on antibiotics for active or recent infection:antibiotic course must have been completed for 4 days    Is patient currently taking any steroid medications? (i.e. Prednisone, Medrol)  No     For patients on steroid medications, course must have been completed for 4 days    Is patient actively being treated for cancer or immunocompromised? No  If YES, do NOT schedule and route to RN pool     Are you able to get on and off an exam table with minimal or no assistance? Yes  If NO, do NOT schedule and route to RN pool    Are you able to roll over and lay on your stomach with minimal or no assistance? Yes  If NO, do NOT schedule and route to RN pool     Has the patient had a flu shot or any other vaccinations within 7 days before or after the procedure.  No     Have you recently had a COVID vaccine or have  plans to get it in the near future? No    If yes, explain that for the vaccine to work best they need to:       wait 1 week before and 1 week after getting Vaccine #1    wait 1 week before and 2 weeks after getting Vaccine #2    wait 1 week before and 2 weeks after getting Vaccine BOOSTER    If patient has concerns about the timing, send to RN pool     Does patient have an MRI/CT?  YES: 2021  Check Procedure Scheduling Grid to see if required.      Was the MRI done within the last 3 years?  Yes    If yes, where was the MRI done i.e.[facility], [facility], [facility], [facility] etc? [facility]      If no, do not schedule and route to RN pool    If MRI was not done at [facility], [facility] or [facility] do NOT schedule and route to RN pool.      If pt has an imaging disc, the injection MAY be scheduled but pt has to bring disc to appt.     If they show up without the disc the injection cannot be done    Procedure Specific Instructions:      If celiac plexus block, informed patient NPO for 6 hours and that it is okay to take medications with sips of water, especially blood pressure medications  Not Applicable         If this is for a cervical procedure, informed patient that aspirin needs to be held for 6 days.   Not Applicable      If IV needed:    Do not schedule procedures requiring IV placement in the first appointment of the day or first appointment after lunch. Do NOT schedule at 0745, 0815 or 1245.     Instructed pt to arrive 30 minutes early for IV start if required. (Check Procedure Scheduling Grid)  Not Applicable    Reminders:      If you are started on any steroids or antibiotics between now and your appointment, you must contact us because the procedure may need to be cancelled.  Yes      For all procedures except radiofrequency ablations (RFAs) and spinal cord stimulator (SCS) trials, informed patient:    IV sedation is not provided for this procedure.  If you feel that an oral anti-anxiety medication is  needed, you can discuss this further with your referring provider or primary care provider.  The Pain Clinic provider will discuss specifics of what the procedure includes at your appointment.  Most procedures last 10-20 minutes.  We use numbing medications to help with any discomfort during the procedure.  Not Applicable      For patients 85 or older we recommend having an adult stay w/ them for the remainder of the day.       Does the patient have any questions?  NO  Jade Steele  [facility]

## 2021-12-21 NOTE — TELEPHONE ENCOUNTER
PA and clinicals submitted via web portal for THORACIC VIVIENNE to WILL.              Karen ESTRADA    [facility]

## 2021-12-22 NOTE — TELEPHONE ENCOUNTER
PA approved.  Effective date: 12/21/21-6/19/22  PA reference #: [id number]  Pt. notified:   OKAY TO SCHEDULE THORACIC VIVIENNE WITH DR RUSTY ESTRADA    [facility]

## 2022-01-04 ENCOUNTER — ANCILLARY PROCEDURE (OUTPATIENT)
Dept: MAMMOGRAPHY | Facility: CLINIC | Age: 87
End: 2022-01-04
Attending: INTERNAL MEDICINE
Payer: COMMERCIAL

## 2022-01-04 DIAGNOSIS — Z12.31 ENCOUNTER FOR SCREENING MAMMOGRAM FOR BREAST CANCER: ICD-10-CM

## 2022-01-04 PROCEDURE — 77067 SCR MAMMO BI INCL CAD: CPT | Mod: TC | Performed by: RADIOLOGY

## 2022-01-06 ENCOUNTER — RADIOLOGY INJECTION OFFICE VISIT (OUTPATIENT)
Dept: PALLIATIVE MEDICINE | Facility: CLINIC | Age: 87
End: 2022-01-06
Attending: PAIN MEDICINE
Payer: COMMERCIAL

## 2022-01-06 VITALS — OXYGEN SATURATION: 95 % | DIASTOLIC BLOOD PRESSURE: 77 MMHG | SYSTOLIC BLOOD PRESSURE: 144 MMHG | HEART RATE: 69 BPM

## 2022-01-06 DIAGNOSIS — M54.14 THORACIC RADICULOPATHY: ICD-10-CM

## 2022-01-06 DIAGNOSIS — M79.2 THORACIC NEURALGIA: ICD-10-CM

## 2022-01-06 PROCEDURE — 62321 NJX INTERLAMINAR CRV/THRC: CPT | Performed by: PAIN MEDICINE

## 2022-01-06 RX ORDER — TRIAMCINOLONE ACETONIDE 40 MG/ML
40 INJECTION, SUSPENSION INTRA-ARTICULAR; INTRAMUSCULAR ONCE
Status: COMPLETED | OUTPATIENT
Start: 2022-01-06 | End: 2022-01-06

## 2022-01-06 RX ADMIN — TRIAMCINOLONE ACETONIDE 40 MG: 40 INJECTION, SUSPENSION INTRA-ARTICULAR; INTRAMUSCULAR at 13:02

## 2022-01-06 ASSESSMENT — PAIN SCALES - GENERAL
PAINLEVEL: NO PAIN (0)
PAINLEVEL: NO PAIN (0)

## 2022-01-06 NOTE — PROGRESS NOTES
Pre procedure Diagnosis: Thoracic neuralgia  Post procedure Diagnosis: Same  Procedure performed: T12-L1 interlaminar epidural steroid injection   Anesthesia: none  Complications: none  Operators: Srinath Jeff MD     Indications:   Ingrid Culver is a 89 year old female.  The patient has a history of bilateral right greater than left lower thoracic pain radiating to her anterior chest.  Examination shows negative allodynia.  she has tried conservative treatment including meds.    MRI reviewed  Options/alternatives, benefits and risks were discussed with the patient including but not limited to bleeding, infection, no pain relief, tissue trauma, exposure to radiation, reaction to medications, spinal cord injury, dural puncture, weakness, numbness and headache.  Questions were answered to her satisfaction and she wishes to proceed. Voluntary informed consent was obtained and signed.     Vitals were reviewed: Yes  Allergies were reviewed:  Yes   Medications were reviewed:  Yes   Pre-procedure pain score: 8/10    Procedure:  The patient's medical history, medications, and allergies were reviewed and reconciled.  After obtaining signed informed consent, the patient was brought into the procedure suite and was placed in a prone position on the procedure table.   A Pause for the Cause was performed.  Patient was prepped and draped in the usual sterile fashion.     The T12-L1 interspace was identified with AP fluoroscopy.  A total of 5ml of 1% lidocaine was used to anesthetize the skin and subcutaneous tissues for a  midline approach.    A 20gauge 3.5inch Touhy needle was advanced utilizing intermittent AP and Lateral fluoroscopy and air for loss of resistance.  The epidural space was encountered on the first pass without difficulties.  Aspiration for blood and CSF was negative.  Needle position was verified by injecting 1 ml of Omnipaque utilizing real-time fluoroscopy that showed good needle placement and  epidural spread without signs of intravascular or intrathecal uptake.  Omnipaque wasted:  9 ml.    Then, after repeated negative aspiration for blood or CSF, a combination of Kenalog 40 mg, Bupivicaine 0.25% 2 ml, diluted with 3 ml of normal saline to a total injectate volume of 6 ml was injected into the epidural space in a slow and incremental fashion and the needle was restyletted and withdrawn.  All injected medications were preservative free.    The injection site was cleaned and a sterile dressing was applied.    The patient tolerated the procedure well without complications and was taken to the recovery room for continued observation.    Images were saved to PACS.    Post-procedure pain score: 6/10  Follow-up includes:   -f/u phone call in one week  -f/u with referring provider     Srinath Jeff MD  [facility] [place]

## 2022-01-06 NOTE — PATIENT INSTRUCTIONS
[facility]   Procedure Discharge Instructions    Today you saw:   Dr. Srinath Jeff    You had an:  Thoracic Epidural steroid injection     Medications used:  Lidocaine   Bupivacaine   Dexamethasone Omnipaque Normal saline            Be cautious when walking. Numbness and/or weakness in the upper extremities may occur for up to 6-8 hours after the procedure due to effect of the local anesthetic    Do not drive for 6 hours. The effect of the local anesthetic could slow your reflexes.     You may resume your regular activities after 24 hours    Avoid strenuous activity for the first 24 hours    You may shower, however avoid swimming, tub baths or hot tubs for 24 hours following your procedure    You may have a mild to moderate increase in pain for several days following the injection.    It may take up to 14 days for the steroid medication to start working although you may feel the effect as early as a few days after the procedure.       You may use ice packs for 10-15 minutes, 3 to 4 times a day at the injection site for comfort    Do not use heat to painful areas for 6 to 8 hours. This will give the local anesthetic time to wear off and prevent you from accidentally burning your skin.     Unless you have been directed to avoid the use of anti-inflammatory medications (NSAIDS), you may use medications such as ibuprofen, Aleve or Tylenol for pain control if needed.     If you were fasting, you may resume your normal diet and medications after the procedure    If you have diabetes, check your blood sugar more frequently than usual as your blood sugar may be higher than normal for 10-14 days following a steroid injection. Contact your doctor who manages your diabetes if your blood sugar is higher than usual    Possible side effects of steroids that you may experience include flushing, elevated blood pressure, increased appetite, mild headaches and restlessness.  All of these symptoms  will get better with time.    If you experience any of the following, call the Pain Clinic during work hours (Mon-Friday 8-4:30 pm) at [phone] or the Provider Line after hours at [phone]:  -Fever over 100 degree F  -Swelling, bleeding, redness, drainage, warmth at the injection site  -Progressive weakness or numbness in your arms  -Loss of bowel or bladder function  -Unusual headache that is not relieved by Tylenol or other pain reliever  -Unusual new onset of pain that is not improving

## 2022-01-06 NOTE — NURSING NOTE
Discharge Information    IV Discontiued Time:  NA    Amount of Fluid Infused:  NA    Discharge Criteria = When patient returns to baseline or as per MD order    Consciousness:  Pt is fully awake    Circulation:  BP +/- 20% of pre-procedure level    Respiration:  Patient is able to breathe deeply    O2 Sat:  Patient is able to maintain O2 Sat >92% on room air    Activity:  Moves 4 extremities on command    Ambulation:  Patient is able to stand and walk or stand and pivot into wheelchair    Dressing:  Clean/dry or No Dressing    Notes:   Discharge instructions and AVS given to patient    Patient meets criteria for discharge?  YES    Admitted to PCU?  No    Responsible adult present to accompany patient home?  Yes    Signature/Title:    Selma Ford RN  RN Care Coordinator  [facility] [place]

## 2022-02-17 PROBLEM — I25.10 CORONARY ARTERY DISEASE INVOLVING NATIVE CORONARY ARTERY OF NATIVE HEART: Status: ACTIVE | Noted: 2017-12-26

## 2022-05-13 ENCOUNTER — OFFICE VISIT (OUTPATIENT)
Dept: INTERNAL MEDICINE | Facility: CLINIC | Age: 87
End: 2022-05-13
Payer: COMMERCIAL

## 2022-05-13 VITALS
DIASTOLIC BLOOD PRESSURE: 71 MMHG | BODY MASS INDEX: 23.36 KG/M2 | HEART RATE: 65 BPM | TEMPERATURE: 97.8 F | WEIGHT: 119 LBS | OXYGEN SATURATION: 96 % | SYSTOLIC BLOOD PRESSURE: 145 MMHG

## 2022-05-13 DIAGNOSIS — Z23 HIGH PRIORITY FOR 2019-NCOV VACCINE: ICD-10-CM

## 2022-05-13 DIAGNOSIS — M41.25 OTHER IDIOPATHIC SCOLIOSIS, THORACOLUMBAR REGION: ICD-10-CM

## 2022-05-13 DIAGNOSIS — F41.9 ANXIETY: ICD-10-CM

## 2022-05-13 DIAGNOSIS — S49.90XA RECENT SHOULDER INJURY: Primary | ICD-10-CM

## 2022-05-13 DIAGNOSIS — G89.29 CHRONIC RIGHT-SIDED THORACIC BACK PAIN: ICD-10-CM

## 2022-05-13 DIAGNOSIS — E67.3 HYPERVITAMINOSIS D: ICD-10-CM
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
## 2022-05-13 DIAGNOSIS — I10 HYPERTENSION GOAL BP (BLOOD PRESSURE) < 150/90: ICD-10-CM

## 2022-05-13 DIAGNOSIS — M54.6 CHRONIC RIGHT-SIDED THORACIC BACK PAIN: ICD-10-CM

## 2022-05-13 PROCEDURE — 96127 BRIEF EMOTIONAL/BEHAV ASSMT: CPT | Performed by: INTERNAL MEDICINE

## 2022-05-13 PROCEDURE — 99214 OFFICE O/P EST MOD 30 MIN: CPT | Mod: 25 | Performed by: INTERNAL MEDICINE

## 2022-05-13 PROCEDURE — 0054A COVID-19,PF,PFIZER (12+ YRS): CPT | Performed by: INTERNAL MEDICINE

## 2022-05-13 PROCEDURE — 91305 COVID-19,PF,PFIZER (12+ YRS): CPT | Performed by: INTERNAL MEDICINE

## 2022-05-13 PROCEDURE — 82306 VITAMIN D 25 HYDROXY: CPT | Performed by: INTERNAL MEDICINE

## 2022-05-13 PROCEDURE — 36415 COLL VENOUS BLD VENIPUNCTURE: CPT | Performed by: INTERNAL MEDICINE

## 2022-05-13 RX ORDER — METOPROLOL TARTRATE 50 MG
25 TABLET ORAL 2 TIMES DAILY
Qty: 90 TABLET | Refills: 3 | Status: SHIPPED | OUTPATIENT
Start: 2022-05-13

## 2022-05-13 ASSESSMENT — PATIENT HEALTH QUESTIONNAIRE - PHQ9: SUM OF ALL RESPONSES TO PHQ QUESTIONS 1-9: 12

## 2022-05-13 NOTE — PROGRESS NOTES
Assessment & Plan     Recent shoulder injury  She is recovering well  Will let me know if phys therapy desired.  For now, will continue current management with he rhome exercies.   Cause of injury, accidental fall, subsequent encounter  See above    Recovering     Hypertension goal BP (blood pressure) < 150/90   Home BP are 99/50  Will lower the metoprolol to 1/2 bid again.     - metoprolol tartrate (LOPRESSOR) 50 MG tablet; Take 0.5 tablets (25 mg) by mouth 2 times daily Take one tablet (50 mg) each morning, and take 1/2 tablet (25 mg) at night    Hypervitaminosis D   wants to recheck:    - Vitamin D Deficiency; Future  - Vitamin D Deficiency      Chronic right-sided thoracic back pain  Other idiopathic scoliosis, thoracolumbar region   she has chronic thoracic/lumbar back pain  Working with pain clinic, pacing self.      Anxiety   she will continue current management with diversion and use her support system  Due to recent fall w/injury, I feel prn anxiolytic is more risk than benefit.  After discussion, patient agrees.        High priority for 2019-nCoV vaccine  Done   - COVID-19,PF,PFIZER (12+ Yrs GRAY LABEL)      I spent a total of 36 minutes on the day of the visit.   Time spent doing chart review, history and exam, documentation and further activities per the note       Depression Screening Follow Up    PHQ 5/13/2022   PHQ-9 Total Score 12   Q9: Thoughts of better off dead/self-harm past 2 weeks Several days            No flowsheet data found.      Follow Up      Follow Up Actions Taken  Patient to follow up with PCP.  Clinic staff to schedule appointment if able.    Discussed the following ways the patient can remain in a safe environment:  be around others       Return in about 6 months (around 11/13/2022) for Physical Exam.    Radha Sheets MD  [facility]    =====================================================  ===============================================    Subjective  "  Ingrid is a 89 year old who presents for the following health issues    Here with daughter.     88 y/o F here for ER f/u.       H/o Bronchiectasis (azithromycin 3X/wk, duonebs bid and VEST), HTN, GERD, Hypothyroid, CAD (RCA Stent 12/2017, follows with [facility]), SHAHZAD (mild-mod, no longer imaging).   Sees pulmonologist & cardiologist yearly.       on 4/24/22  presented for evaluation a trip and fall injury of her right shoulder  Xray (-) for fx.  conservative mgmt recommended.        11/22/21::  Elevated Vit D:   \"For now, stop all vitamin D    Recheck vitamin D level in a couple of months -- once the D is in range (below 70 or so) can restart just the 1000 IU once or twice daily. \"    Has some range of motion difficulties now, is improving.  Doing some home therapy now that seems to help.      Got Incruse Elipta for breathing via pulmonary.    She has many questions.  ... we discussed at length.       She is thinking she would like a prn for anxiety.  She used to take prn Librium a long time ago.    Discussed risks and benefits at great length...   Patient agrees after discussion the risks of prn anxiolytic is more than benefit.     Her BP are 99/50 at home              HPI     ED/UC Followup:    [facility]   Date of visit: 4/24/22  Reason for visit: Contusion of right shouder  Current Status: better            Review of Systems   Constitutional, HEENT, cardiovascular, pulmonary, gi and gu systems are negative, except as otherwise noted.      Objective    BP (!) 145/71 (BP Location: Right arm, Patient Position: Sitting, Cuff Size: Adult Small)   Pulse 65   Temp 97.8  F (36.6  C) (Oral)   Wt 54 kg (119 lb)   SpO2 96%   BMI 23.36 kg/m    There is no height or weight on file to calculate BMI.  Physical Exam   GENERAL: healthy, alert and no distress  GENERAL: alert, no distress, frail and elderly  Well groomed.    EYES: Eyes grossly normal to inspection, PERRL and conjunctivae and sclerae " normal  MS: no gross musculoskeletal defects noted, no edema  MS:severe T/L scoiosis.     R Shoulder exam shows positive impingement signs are present with pain at high arc of abduction and forward flexion on right. There is tenderness of the anterior supraspinatous area .   SKIN: no suspicious lesions or rashes  PSYCH: mentation appears normal, affect normal/bright    Vit D level pending

## 2022-05-13 NOTE — PATIENT INSTRUCTIONS
"If your \"Home Physical Therapy\" stops improving the shoulder, we could order R shoulder Physical therapy is you want      You are eligible for shingles vaccine series (via pharmacy)    Reduce metoprolol to 1/2 twice daily    \"Incruse Ellipta\"  is a long acting \"duoneb\"        "

## 2022-05-13 NOTE — LETTER
My COPD Action Plan     Name: Ingrid Culver    YOB: 1932   Date: 5/13/2022    My doctor: Radha Sheets MD   My clinic: [address] [address]  [phone]  My Controller Medicine: Umeclidinium (Incruse Ellipta)   Dose:       My Rescue Medicine: duoneb   Dose:       My Flare Up Medicine: call clinic   Dose:       My COPD Severity: Moderate = FeV1 < 79% -50%      Use of Oxygen: Oxygen Not Prescribed      Make sure you've had your pneumonia   vaccines.          GREEN ZONE       Doing well today      Usual level of activity and exercise    Usual amount of cough and mucus    No shortness of breath    Usual level of health (thinking clearly, sleeping well, feel like eating) Actions:      Take daily medicines    Use oxygen as prescribed    Follow regular exercise and diet plan    Avoid cigarette smoke and other irritants that harm the lungs           YELLOW ZONE          Having a bad day or flare up      Short of breath more than usual    A lot more sputum (mucus) than usual    Sputum looks yellow, green, tan, brown or bloody    More coughing or wheezing    Fever or chills    Less energy; trouble completing activities    Trouble thinking or focusing    Using quick relief inhaler or nebulizer more often    Poor sleep; symptoms wake me up    Do not feel like eating Actions:      Get plenty of rest    Take daily medicines    Use quick relief inhaler every 4 hours    If you use oxygen, call you doctor to see if you should adjust your oxygen    Do breathing exercises or other things to help you relax    Let a loved one, friend or neighbor know you are feeling worse    Call your care team if you have 2 or more symptoms.  Start taking steroids or antibiotics if directed by your care team           RED ZONE       Need medical care now      Severe shortness of breath (feel you can't breathe)    Fever, chills    Not enough breath to do any  activity    Trouble coughing up mucus, walking or talking    Blood in mucus    Frequent coughing   Rescue medicines are not working    Not able to sleep because of breathing    Feel confused or drowsy    Chest pain    Actions:      Call your health care team.  If you cannot reach your care team, call 911 or go to the emergency room.        Annual Reminders:  Meet with Care Team, Flu Shot every Fall  Pharmacy:    [facility] [place], [place] - [address]  [facility] [place], [place] - [address]

## 2022-05-14 LAB — DEPRECATED CALCIDIOL+CALCIFEROL SERPL-MC: 53 UG/L (ref 20–75)

## 2022-05-16 NOTE — RESULT ENCOUNTER NOTE
Ingrid Culver    Vitamin D level is now right at target.  Continue current management     Sincerely,       JOSE ANGEL MUSA M.D.

## 2022-10-09 ENCOUNTER — HEALTH MAINTENANCE LETTER (OUTPATIENT)
Age: 87
End: 2022-10-09

## 2022-10-20 ENCOUNTER — OFFICE VISIT (OUTPATIENT)
Dept: FAMILY MEDICINE | Facility: CLINIC | Age: 87
End: 2022-10-20
Payer: COMMERCIAL

## 2022-10-20 VITALS
HEART RATE: 82 BPM | SYSTOLIC BLOOD PRESSURE: 132 MMHG | DIASTOLIC BLOOD PRESSURE: 70 MMHG | OXYGEN SATURATION: 93 % | BODY MASS INDEX: 23.36 KG/M2 | WEIGHT: 119 LBS | TEMPERATURE: 98.9 F | HEIGHT: 60 IN

## 2022-10-20 DIAGNOSIS — J47.1 BRONCHIECTASIS WITH ACUTE EXACERBATION (H): Primary | ICD-10-CM

## 2022-10-20 DIAGNOSIS — J44.9 CHRONIC OBSTRUCTIVE PULMONARY DISEASE, UNSPECIFIED COPD TYPE (H): ICD-10-CM

## 2022-10-20 DIAGNOSIS — I25.119 CORONARY ARTERY DISEASE INVOLVING NATIVE CORONARY ARTERY OF NATIVE HEART WITH ANGINA PECTORIS (H): ICD-10-CM

## 2022-10-20 PROCEDURE — 99214 OFFICE O/P EST MOD 30 MIN: CPT | Performed by: FAMILY MEDICINE

## 2022-10-20 RX ORDER — DOXYCYCLINE HYCLATE 100 MG
100 TABLET ORAL 2 TIMES DAILY
Qty: 20 TABLET | Refills: 0 | Status: SHIPPED | OUTPATIENT
Start: 2022-10-20 | End: 2023-01-12

## 2022-10-20 ASSESSMENT — PATIENT HEALTH QUESTIONNAIRE - PHQ9
10. IF YOU CHECKED OFF ANY PROBLEMS, HOW DIFFICULT HAVE THESE PROBLEMS MADE IT FOR YOU TO DO YOUR WORK, TAKE CARE OF THINGS AT HOME, OR GET ALONG WITH OTHER PEOPLE: NOT DIFFICULT AT ALL
SUM OF ALL RESPONSES TO PHQ QUESTIONS 1-9: 7
SUM OF ALL RESPONSES TO PHQ QUESTIONS 1-9: 7

## 2022-10-20 ASSESSMENT — PAIN SCALES - GENERAL: PAINLEVEL: NO PAIN (0)

## 2022-10-20 NOTE — PROGRESS NOTES
Assessment & Plan       ICD-10-CM    1. Bronchiectasis with acute exacerbation (H)  J47.1 doxycycline hyclate (VIBRA-TABS) 100 MG tablet      2. Chronic obstructive pulmonary disease, unspecified COPD type (H)  J44.9       3. Coronary artery disease involving native coronary artery of native heart with angina pectoris (H)  I25.119         We will treat her with doxycycline 100 mg twice a day for 10 days  I advised her to hold the azithromycin during this time  Continue other same baseline meds      Return in about 1 week (around 10/27/2022) for a recheck if symptoms worsen or fail to improve.    Yunior Sampson MD  [facility] PB Quintero is a 89 year old accompanied by her daughter, presenting for the following health issues:  URI      URI    History of Present Illness       Reason for visit:  Hard time breathing,cough, green phlegm  Symptom onset:  1-2 weeks ago  Symptoms include:  Hard time breathing, cough, green phlegm  Symptom intensity:  Moderate  Symptom progression:  Worsening  Had these symptoms before:  Yes  Has tried/received treatment for these symptoms:  No    She eats 2-3 servings of fruits and vegetables daily.She consumes 0 sweetened beverage(s) daily.She exercises with enough effort to increase her heart rate 9 or less minutes per day.  She exercises with enough effort to increase her heart rate 3 or less days per week.   She is taking medications regularly.    Today's PHQ-9         PHQ-9 Total Score: 7    PHQ-9 Q9 Thoughts of better off dead/self-harm past 2 weeks :   Not at all    How difficult have these problems made it for you to do your work, take care of things at home, or get along with other people: Not difficult at all     She is here with her daughter.  She has a history of bronchiectasis and COPD.  She normally takes azithromycin 3 days a week.  She takes other medication routinely as below.  She has a history of CAD and CVA per her chart.  She has  had some increased respiratory symptoms in the last week or 2 and has been coughing up green phlegm.  That is typically a sign for her that she is dealing with an infection.  She and her daughter want to get on top of this before it gets worse.  She has had no known fever.  She has not checked herself for COVID, but she really does not get out much at all and has had no known exposures.    Patient Active Problem List   Diagnosis     SHOULDER IMPINGEMENT - left     Hyperlipidemia LDL goal <130     Insomnia     Trigger finger, acquired - right ring     Trigger thumb - right     Bronchiectasis (H)     Cerebral infarction (H)     Vitreous syneresis     PVD (posterior vitreous detachment)     Pseudophakia OU     Esotropia chronic c prism     Dry eyes, bilateral     Other idiopathic scoliosis, thoracolumbar region     Ganglion cyst of finger of right hand     Hypertension goal BP (blood pressure) < 150/90     Hypothyroidism due to acquired atrophy of thyroid     Coronary artery disease involving native coronary artery of native heart, angina presence unspecified     Peripheral arterial disease (H)     COPD (chronic obstructive pulmonary disease) (H)     Age-related osteoporosis without current pathological fracture     Current Outpatient Medications   Medication     Acetaminophen (TYLENOL PO)     aspirin 81 MG tablet     azithromycin (ZITHROMAX) 250 MG tablet     Docusate Sodium (COLACE PO)         ipratropium - albuterol 0.5 mg/2.5 mg/3 mL (DUONEB) 0.5-2.5 (3) MG/3ML neb solution     isosorbide dinitrate (ISORDIL) 30 MG tablet     levothyroxine (SYNTHROID/LEVOTHROID) 25 MCG tablet     metoprolol tartrate (LOPRESSOR) 50 MG tablet     polyethylene glycol (MIRALAX/GLYCOLAX) Packet     psyllium (METAMUCIL) WAFR     rosuvastatin (CRESTOR) 10 MG tablet     Nitroglycerin (NITROTAB SL)     umeclidinium (INCRUSE ELLIPTA) 62.5 MCG/INH inhaler     No current facility-administered medications for this visit.         Review of  "Systems   Noncontributory except as above.      Objective    /70 (BP Location: Right arm, Patient Position: Sitting, Cuff Size: Adult Small)   Pulse 82   Temp 98.9  F (37.2  C) (Oral)   Ht 1.517 m (4' 11.72\")   Wt 54 kg (119 lb)   SpO2 93%   BMI 23.46 kg/m    Body mass index is 23.46 kg/m .  Physical Exam   GENERAL: alert, no distress and elderly  HENT: normal cephalic/atraumatic, nose and mouth without ulcers or lesions, oropharynx clear and oral mucous membranes moist  NECK: Supple  RESP: a few scattered rhonchi, no wheezes      Previous chest x-ray results were reviewed in her chart            "

## 2022-11-04 ENCOUNTER — NURSE TRIAGE (OUTPATIENT)
Dept: INTERNAL MEDICINE | Facility: CLINIC | Age: 87
End: 2022-11-04

## 2022-11-04 ENCOUNTER — OFFICE VISIT (OUTPATIENT)
Dept: URGENT CARE | Facility: URGENT CARE | Age: 87
End: 2022-11-04
Payer: COMMERCIAL

## 2022-11-04 ENCOUNTER — ANCILLARY PROCEDURE (OUTPATIENT)
Dept: GENERAL RADIOLOGY | Facility: CLINIC | Age: 87
End: 2022-11-04
Attending: PHYSICIAN ASSISTANT
Payer: COMMERCIAL

## 2022-11-04 ENCOUNTER — E-VISIT (OUTPATIENT)
Dept: FAMILY MEDICINE | Facility: CLINIC | Age: 87
End: 2022-11-04
Payer: COMMERCIAL

## 2022-11-04 VITALS
HEART RATE: 74 BPM | BODY MASS INDEX: 23.46 KG/M2 | SYSTOLIC BLOOD PRESSURE: 105 MMHG | DIASTOLIC BLOOD PRESSURE: 55 MMHG | RESPIRATION RATE: 18 BRPM | OXYGEN SATURATION: 97 % | WEIGHT: 119 LBS | TEMPERATURE: 97.6 F

## 2022-11-04 DIAGNOSIS — M25.511 ACUTE PAIN OF RIGHT SHOULDER: ICD-10-CM

## 2022-11-04 DIAGNOSIS — M25.511 ACUTE PAIN OF RIGHT SHOULDER: Primary | ICD-10-CM

## 2022-11-04 DIAGNOSIS — Z53.9 ERRONEOUS ENCOUNTER--DISREGARD: Primary | ICD-10-CM

## 2022-11-04 PROCEDURE — 73030 X-RAY EXAM OF SHOULDER: CPT | Mod: TC | Performed by: RADIOLOGY

## 2022-11-04 PROCEDURE — 99213 OFFICE O/P EST LOW 20 MIN: CPT | Performed by: PHYSICIAN ASSISTANT

## 2022-11-04 NOTE — TELEPHONE ENCOUNTER
"Received call from patient's daughter. She is calling to see if there are any appointments available today. States that all of the sudden pt has a huge bruise on shoulder right. It is the size of soft ball and extends down to the armpit. Pt states that she has not fallen or bumped it that she is aware of. Does not take any blood thinners. Only baby asa.  Painful, yes. She couldn't even lift it.   They went to the chiropractor and he put the tens machine on it and she can lift it a little bit now. She can move it, but it is difficult and it looks puffy.     Note that per Dr. Sheets's recommendation in E-visit note: \"I am not able to evaluate this type of problem with an e visit. Please see a provider for this. Consider Urgent care or ER visit.\"    Triage disposition is see PCP in 24 hours.     Advised since we do not have any openings today to consider going into UC. Daughter verbalized understanding and states that she is familiar with [place]  location, or she may take pt into ED.      Reason for Disposition    Large swelling or bruise (> 2 inches or 5 cm)    Additional Information    Negative: Serious injury with multiple fractures    Negative: [1] Major bleeding (actively bleeding or spurting) AND [2] can't be stopped    Negative: [1] Large blood loss AND [2] fainted or too weak to stand    Negative: Bone sticking through the skin    Negative: Sounds like a life-threatening emergency to the triager    Negative: Elbow injury    Negative: Wrist or hand injury    Negative: Muscle pain caused by excessive exercise or work (overuse)    Negative: Shoulder pain not caused by an injury    Negative: Wound infection suspected (cut or other wound now looks infected)    Negative: Looks like a broken bone (crooked or deformed)    Negative: Severe deformity of the shoulder (looks out of place)    Negative: Can't move shoulder at all    Negative: [1] Collarbone is painful AND [2] can't raise arm over head    Negative: [1] " Skin beyond injury is pale or blue AND [2] begins within 2 hours of injury (Exception: bleeding into the skin)    Negative: New numbness or tingling in fingers now    Negative: [1] Bleeding AND [2] won't stop after 10 minutes of direct pressure (using correct technique)    Negative: Skin is split open or gaping (if unsure, refer in if cut length > 1/2 inch or 12 mm)    Negative: Sounds like a serious injury to the triager    Negative: Suspicious history for the injury (especially if not yet crawling)    Negative: [1] Age < 2 years AND [2] cries when caller tries to move the shoulder    Negative: [1] SEVERE pain AND [2] not improved 2 hours after pain medicine/ice packs    Negative: [1] After day 2 AND [2] pain at site of injury becomes worse AND [3] unexplained fever occurs    Protocols used: SHOULDER INJURY-P-      Anum Espinoza RN   [facility]

## 2022-11-04 NOTE — PATIENT INSTRUCTIONS
Thank you for choosing us for your care. I think an in-clinic visit would be best next steps based on your symptoms. Please schedule a clinic appointment; you won t be charged for this eVisit.      You can schedule an appointment right here in [facility], or call [phone]

## 2022-11-04 NOTE — PROGRESS NOTES
"Assessment & Plan     1. Acute pain of right shoulder      - XR Shoulder Right G/E 3 Views; Future    No fracture. Ice, tylenol as needed. Monitor symptoms and follow up as needed.                 PE[facility] [place]    Subjective     Ingrid is a 89 year old female who presents to clinic today for the following health issues:  Chief Complaint   Patient presents with     Urgent Care     Derm Problem     Per patient states she has right shoulder severely bruised, and not sure why. It started about 1-2 days ago. It is on same shoulder that she had a fall on back in April of this year. Patient feels part of the bruise is puffy. Patient did an E-visit and was advised to be seen in urgent care or ED today for eval.      HPI    Here for right shoulder pain and bruise. Advised UC visit from clinic nurse. Shoulder pain for some months,  after a fall,  chronically. Newly visualized bruise. No recollection of any fall or bumping into anything. \"appeared out of the ursula\". Bruise before recent chiropractor visit. On no anti-coagulants. Within the year platelets 214.             Review of Systems        Objective    /55   Pulse 74   Temp 97.6  F (36.4  C) (Tympanic)   Resp 18   Wt 54 kg (119 lb)   SpO2 97%   BMI 23.46 kg/m    Physical Exam  Musculoskeletal:      Right shoulder: Swelling present.        Arms:                     "

## 2022-11-25 ENCOUNTER — TELEPHONE (OUTPATIENT)
Dept: INTERNAL MEDICINE | Facility: CLINIC | Age: 87
End: 2022-11-25

## 2022-11-25 DIAGNOSIS — S49.91XA SHOULDER INJURY, RIGHT, INITIAL ENCOUNTER: Primary | ICD-10-CM

## 2022-11-25 NOTE — TELEPHONE ENCOUNTER
Patient's daughter, Loni, calling.  She stated that patient had a fall back in May 2022 and was seen for right shoulder pain.  Shoulder pain continues to bother patient.  Loni brought patient to  on 11/4/2022 due to patient waking up with black and blue discoloration to the shoulder and a visible lump.  Denies any new injuries.  Per Loni, patient has had 2 xrays since the shoulder injury and both times, nothing was found.   Loni requesting further evaluation with MRI or whatever else provider would recommend  She is requesting that a covering provider advise if able    Otcavio Bañuelos RN  [facility]

## 2022-11-25 NOTE — TELEPHONE ENCOUNTER
Called deepak back to give number unable to write down so sending this through [employer]. Patients daughter will call and schedule appointment.         Karly SOSA CMA ([facility])

## 2022-11-28 ENCOUNTER — OFFICE VISIT (OUTPATIENT)
Dept: ORTHOPEDICS | Facility: CLINIC | Age: 87
End: 2022-11-28
Attending: INTERNAL MEDICINE
Payer: COMMERCIAL

## 2022-11-28 VITALS
DIASTOLIC BLOOD PRESSURE: 72 MMHG | SYSTOLIC BLOOD PRESSURE: 126 MMHG | HEART RATE: 76 BPM | WEIGHT: 119 LBS | HEIGHT: 60 IN | BODY MASS INDEX: 23.36 KG/M2 | RESPIRATION RATE: 20 BRPM

## 2022-11-28 DIAGNOSIS — S49.91XA SHOULDER INJURY, RIGHT, INITIAL ENCOUNTER: ICD-10-CM

## 2022-11-28 DIAGNOSIS — M75.41 IMPINGEMENT SYNDROME OF RIGHT SHOULDER: ICD-10-CM

## 2022-11-28 DIAGNOSIS — R29.898 WEAKNESS OF SHOULDER: Primary | ICD-10-CM

## 2022-11-28 PROCEDURE — 20610 DRAIN/INJ JOINT/BURSA W/O US: CPT | Mod: RT | Performed by: ORTHOPAEDIC SURGERY

## 2022-11-28 PROCEDURE — 99203 OFFICE O/P NEW LOW 30 MIN: CPT | Mod: 25 | Performed by: ORTHOPAEDIC SURGERY

## 2022-11-28 RX ORDER — LIDOCAINE HYDROCHLORIDE 10 MG/ML
5 INJECTION, SOLUTION INFILTRATION; PERINEURAL
Status: SHIPPED | OUTPATIENT
Start: 2022-11-28

## 2022-11-28 RX ORDER — TRIAMCINOLONE ACETONIDE 40 MG/ML
40 INJECTION, SUSPENSION INTRA-ARTICULAR; INTRAMUSCULAR
Status: SHIPPED | OUTPATIENT
Start: 2022-11-28

## 2022-11-28 RX ADMIN — TRIAMCINOLONE ACETONIDE 40 MG: 40 INJECTION, SUSPENSION INTRA-ARTICULAR; INTRAMUSCULAR at 08:54

## 2022-11-28 RX ADMIN — LIDOCAINE HYDROCHLORIDE 5 ML: 10 INJECTION, SOLUTION INFILTRATION; PERINEURAL at 08:54

## 2022-11-28 NOTE — PATIENT INSTRUCTIONS
You have had a steroid injection today.  For the first 2 hours there will likely be some numbing in the joint from the lidocaine.  This is a good sign, indicating that the injection is in the right place.  In 2 hours the lidocaine will wear off, and the joint will hurt like you had a shot.  Each day the cortisone makes it feel better.  It reaches peak effect in 2 weeks.  We expect it to last for 3 months.  You may resume regular activity when you feel ready.  If you are diabetic, your glucoses will be quite high for several days.    Aleve up to 4 tablets per day or ibuprofen up to 12 tablets per day.

## 2022-11-28 NOTE — PROGRESS NOTES
Large Joint Injection/Arthocentesis: R subacromial bursa    Date/Time: 11/28/2022 8:54 AM  Performed by: Yunior Quiroga MD  Authorized by: Yunior Quiroga MD     Indications:  Pain  Needle Size:  22 G  Guidance: landmark guided    Approach:  Posterolateral  Location:  Shoulder      Site:  R subacromial bursa  Medications:  40 mg triamcinolone 40 MG/ML; 5 mL lidocaine 1 %  Outcome:  Tolerated well, no immediate complications  Procedure discussed: discussed risks, benefits, and alternatives    Consent Given by:  Patient  Timeout: timeout called immediately prior to procedure    Prep: patient was prepped and draped in usual sterile fashion

## 2022-11-28 NOTE — LETTER
11/28/2022         RE: Ingrid Culver  [address] [facility] [address]        Dear Colleague,    Thank you for referring your patient, Ingrid Culver, to the [facility]. Please see a copy of my visit note below.    Large Joint Injection/Arthocentesis: R subacromial bursa    Date/Time: 11/28/2022 8:54 AM  Performed by: Yunior Quiroga MD  Authorized by: Yunior Quiroga MD     Indications:  Pain  Needle Size:  22 G  Guidance: landmark guided    Approach:  Posterolateral  Location:  Shoulder      Site:  R subacromial bursa  Medications:  40 mg triamcinolone 40 MG/ML; 5 mL lidocaine 1 %  Outcome:  Tolerated well, no immediate complications  Procedure discussed: discussed risks, benefits, and alternatives    Consent Given by:  Patient  Timeout: timeout called immediately prior to procedure    Prep: patient was prepped and draped in usual sterile fashion            Ingrid Culver is a 89 year old female who is seen as self referral for right shoulder pain.  This is been present approximately 6 months.  She had fallen either this summer or last summer.  She and her daughter disagree on the year.  She has had some pain in the shoulder since then.  She has episodic sharp pains usually rated only 1-2 out of 10.  She has pain with reaching up away from her body.  In the early morning it does not really hurt.  It seems to get worse throughout the day with activity.  She has difficulty pulling up slacks.  In early November she developed significant bruising and swelling about the shoulder.  This scared her and she called in to be seen.  She was seen at urgent care with x-ray showing no fractures or deformities.  She was sent to us for evaluation.  The swelling and bruising have pretty much resolved.    X-ray of the shoulder on 11/4/2022 shows no significant arthritic changes.  No fractures.    Past Medical History:   Diagnosis Date     Allergies     idiopathic,  anaphyllactic rx's     Bronchiectasis     cough on inhalers recurrent infections     Cataract      Cerebral infarction (H)      COPD (chronic obstructive pulmonary disease) (H)      Disorders of lipoid metabolism     hx of on low fat diet     Esotropia chronic c prism 11/6/2013     Heart disease      Hypertension goal BP (blood pressure) < 150/90      Knee pain      Other bone/cartilage disease      Post-Nasal Drainage      Unspecified asthma(493.90)     Asthma     Unspecified hypothyroidism      Uterine fibroid     10/08 U/s shows 4X2.9X3/3       Past Surgical History:   Procedure Laterality Date     APPENDECTOMY       BREAST BIOPSY, RT/LT      Breat Biopsy RT/LT     CARDIAC SURGERY  12/03/2017    STINT     CATARACT IOL, RT/LT       COLONOSCOPY  06/00   And 2008    diverticulosis     COLONOSCOPY  1-13-12     ENDOSCOPY  1-13-12     EXTRACAPSULAR CATARACT EXTRATION WITH INTRAOCULAR LENS IMPLANT  01/2000    both eyes     FLEXIBLE SIGMOIDOSCOPY  06/95,08/98     JOINT REPLACEMTN, KNEE RT/LT  92    right     ZZC TOTAL KNEE ARTHROPLASTY  06/25/03    left poly exchange       Family History   Problem Relation Age of Onset     Breast Cancer Mother      Arthritis Mother      Thyroid Disease Mother      Cardiovascular Father      Asthma Maternal Grandmother      Cancer Brother      Genetic Disorder Brother      Other Cancer Brother         Esophageal     Cancer Son         tonsil and tongue, bladder     Alcohol/Drug Son      Other Cancer Son      Heart Disease Daughter      Asthma Daughter      Genetic Disorder Brother      Other Cancer Brother      Glaucoma No family hx of      Macular Degeneration No family hx of        Social History     Socioeconomic History     Marital status:      Spouse name: Not on file     Number of children: Not on file     Years of education: Not on file     Highest education level: Not on file   Occupational History     Not on file   Tobacco Use     Smoking status: Former     Packs/day: 0.00      Years: 0.00     Pack years: 0.00     Types: Cigarettes     Quit date: 1974     Years since quittin.0     Smokeless tobacco: Never   Vaping Use     Vaping Use: Never used   Substance and Sexual Activity     Alcohol use: Yes     Comment: On special occassion     Drug use: No     Sexual activity: Not Currently   Other Topics Concern     Parent/sibling w/ CABG, MI or angioplasty before 65F 55M? No   Social History Narrative    Lives in house -  () and 4 sons (one of the sons lives with her) and 4 daughters.  Does volunteer to help [employer].  Has a lot of support from her kids who live in the area (6 of the 8 of them)    Still driving      Social Determinants of Health     Financial Resource Strain: Not on file   Food Insecurity: Not on file   Transportation Needs: Not on file   Physical Activity: Not on file   Stress: Not on file   Social Connections: Not on file   Intimate Partner Violence: Not on file   Housing Stability: Not on file       Current Outpatient Medications   Medication Sig Dispense Refill     Acetaminophen (TYLENOL PO) Take 500 mg by mouth       aspirin 81 MG tablet Take by mouth daily       azithromycin (ZITHROMAX) 250 MG tablet 1 tablet every M,W,F       Docusate Sodium (COLACE PO)        doxycycline hyclate (VIBRA-TABS) 100 MG tablet Take 1 tablet (100 mg) by mouth 2 times daily 20 tablet 0     ipratropium - albuterol 0.5 mg/2.5 mg/3 mL (DUONEB) 0.5-2.5 (3) MG/3ML neb solution Take 1 vial by nebulization every 6 hours as needed for shortness of breath / dyspnea or wheezing       isosorbide dinitrate (ISORDIL) 30 MG tablet Take 30 mg by mouth daily       levothyroxine (SYNTHROID/LEVOTHROID) 25 MCG tablet Take 1 tablet (25 mcg) by mouth daily 90 tablet 3     metoprolol tartrate (LOPRESSOR) 50 MG tablet Take 0.5 tablets (25 mg) by mouth 2 times daily Take one tablet (50 mg) each morning, and take 1/2 tablet (25 mg) at night 90 tablet 3     Nitroglycerin (NITROTAB SL) Place 0.4  "mg under the tongue (Patient not taking: Reported on 10/20/2022)       polyethylene glycol (MIRALAX/GLYCOLAX) Packet Take 1 packet by mouth daily       psyllium (METAMUCIL) WAFR Take 2 Wafers by mouth daily       rosuvastatin (CRESTOR) 10 MG tablet Take 1 tablet (10 mg) by mouth daily 90 tablet 3     umeclidinium (INCRUSE ELLIPTA) 62.5 MCG/INH inhaler Inhale 1 puff into the lungs daily (Patient not taking: Reported on 10/20/2022)         Allergies   Allergen Reactions     Zocor [Hmg-Coa-R Inhibitors]        REVIEW OF SYSTEMS:  CONSTITUTIONAL:  NEGATIVE for fever, chills, change in weight, not feeling tired  SKIN:  NEGATIVE for worrisome rashes, no skin lumps, no skin ulcers and no non-healing wounds  EYES:  NEGATIVE for vision changes or irritation.  ENT/MOUTH:  NEGATIVE.  No hearing loss, no hoarseness, no difficulty swallowing.  RESP:  NEGATIVE. No cough or shortness of breath.  CV:  NEGATIVE for chest pain, palpitations or peripheral edema  GI:  NEGATIVE for nausea, abdominal pain, heartburn, or change in bowel habits  :  Negative. No dysuria, no hematuria  MUSCULOSKELETAL:  See HPI above  NEURO:  NEGATIVE . No headaches, no dizziness,  no numbness  ENDOCRINE:  NEGATIVE for temperature intolerance, skin/hair changes  HEME/ALLERGY/IMMUNE:  NEGATIVE for bleeding problems  PSYCHIATRIC:  NEGATIVE. no anxiety, no depression.     Exam:  Vitals: /72   Pulse 76   Resp 20   Ht 1.518 m (4' 11.75\")   Wt 54 kg (119 lb)   BMI 23.44 kg/m    BMI= Body mass index is 23.44 kg/m .  Constitutional:  healthy, alert and no distress  Neuro: Alert and Oriented x 3, no focal defects.  Psych: Affect normal   Respiratory: Breathing not labored.  Cardiovascular: normal peripheral pulses  Lymph: no adenopathy  Skin: No rashes,worrisome lesions or skin problems  She has full range of motion of both shoulders but does have pain with internal and external rotation on the right compared to the left.  She has weakness of the left " shoulder on resisted external rotation.  She has some pain with resisted internal and external rotation on the right, but actually has better strength than the left.  She has some pain with empty can test and blocking test on the right.  She has a bit more weakness on the left.  Sensation, motor and circulation are intact.    Assessment:  Right shoulder impingement with possible rotator cuff tendinosis.  Likely old left rotator cuff tear without pain.    Plan: At 89 we would be unlikely to want to proceed with rotator cuff repair.  Given this I feel symptomatic treatment is indicated here.  Patient desires injection today of right shoulder(s).  Risks, benefits, potential complications and alternatives were discussed.  With the patient's consent, sterile prep was performed of right shoulder(s).  Right shoulder was injected with Kenalog 40 mg and lidocaine at shoulder subacromial space from the posterolateral approach .     They were wondering if strengthening would help, and will refer to physical therapy for this.  Medication as needed for pain.      Again, thank you for allowing me to participate in the care of your patient.        Sincerely,        Yunior Quiroga MD

## 2022-11-29 NOTE — PROGRESS NOTES
Ingrid Culver is a 89 year old female who is seen as self referral for right shoulder pain.  This is been present approximately 6 months.  She had fallen either this summer or last summer.  She and her daughter disagree on the year.  She has had some pain in the shoulder since then.  She has episodic sharp pains usually rated only 1-2 out of 10.  She has pain with reaching up away from her body.  In the early morning it does not really hurt.  It seems to get worse throughout the day with activity.  She has difficulty pulling up slacks.  In early November she developed significant bruising and swelling about the shoulder.  This scared her and she called in to be seen.  She was seen at urgent care with x-ray showing no fractures or deformities.  She was sent to us for evaluation.  The swelling and bruising have pretty much resolved.    X-ray of the shoulder on 11/4/2022 shows no significant arthritic changes.  No fractures.    Past Medical History:   Diagnosis Date     Allergies     idiopathic, anaphyllactic rx's     Bronchiectasis     cough on inhalers recurrent infections     Cataract      Cerebral infarction (H)      COPD (chronic obstructive pulmonary disease) (H)      Disorders of lipoid metabolism     hx of on low fat diet     Esotropia chronic c prism 11/6/2013     Heart disease      Hypertension goal BP (blood pressure) < 150/90      Knee pain      Other bone/cartilage disease      Post-Nasal Drainage      Unspecified asthma(493.90)     Asthma     Unspecified hypothyroidism      Uterine fibroid     10/08 U/s shows 4X2.9X3/3       Past Surgical History:   Procedure Laterality Date     APPENDECTOMY       BREAST BIOPSY, RT/LT      Breat Biopsy RT/LT     CARDIAC SURGERY  12/03/2017    STINT     CATARACT IOL, RT/LT       COLONOSCOPY  06/00   And 2008    diverticulosis     COLONOSCOPY  1-13-12     ENDOSCOPY  1-13-12     EXTRACAPSULAR CATARACT EXTRATION WITH INTRAOCULAR LENS IMPLANT  01/2000    both eyes      FLEXIBLE SIGMOIDOSCOPY  ,     JOINT REPLACEMTN, KNEE RT/LT  92    right     ZZC TOTAL KNEE ARTHROPLASTY  03    left poly exchange       Family History   Problem Relation Age of Onset     Breast Cancer Mother      Arthritis Mother      Thyroid Disease Mother      Cardiovascular Father      Asthma Maternal Grandmother      Cancer Brother      Genetic Disorder Brother      Other Cancer Brother         Esophageal     Cancer Son         tonsil and tongue, bladder     Alcohol/Drug Son      Other Cancer Son      Heart Disease Daughter      Asthma Daughter      Genetic Disorder Brother      Other Cancer Brother      Glaucoma No family hx of      Macular Degeneration No family hx of        Social History     Socioeconomic History     Marital status:      Spouse name: Not on file     Number of children: Not on file     Years of education: Not on file     Highest education level: Not on file   Occupational History     Not on file   Tobacco Use     Smoking status: Former     Packs/day: 0.00     Years: 0.00     Pack years: 0.00     Types: Cigarettes     Quit date: 1974     Years since quittin.0     Smokeless tobacco: Never   Vaping Use     Vaping Use: Never used   Substance and Sexual Activity     Alcohol use: Yes     Comment: On special occassion     Drug use: No     Sexual activity: Not Currently   Other Topics Concern     Parent/sibling w/ CABG, MI or angioplasty before 65F 55M? No   Social History Narrative    Lives in house -  () and 4 sons (one of the sons lives with her) and 4 daughters.  Does volunteer to help [employer].  Has a lot of support from her kids who live in the area (6 of the 8 of them)    Still driving      Social Determinants of Health     Financial Resource Strain: Not on file   Food Insecurity: Not on file   Transportation Needs: Not on file   Physical Activity: Not on file   Stress: Not on file   Social Connections: Not on file   Intimate Partner Violence: Not  on file   Housing Stability: Not on file       Current Outpatient Medications   Medication Sig Dispense Refill     Acetaminophen (TYLENOL PO) Take 500 mg by mouth       aspirin 81 MG tablet Take by mouth daily       azithromycin (ZITHROMAX) 250 MG tablet 1 tablet every M,W,F       Docusate Sodium (COLACE PO)        doxycycline hyclate (VIBRA-TABS) 100 MG tablet Take 1 tablet (100 mg) by mouth 2 times daily 20 tablet 0     ipratropium - albuterol 0.5 mg/2.5 mg/3 mL (DUONEB) 0.5-2.5 (3) MG/3ML neb solution Take 1 vial by nebulization every 6 hours as needed for shortness of breath / dyspnea or wheezing       isosorbide dinitrate (ISORDIL) 30 MG tablet Take 30 mg by mouth daily       levothyroxine (SYNTHROID/LEVOTHROID) 25 MCG tablet Take 1 tablet (25 mcg) by mouth daily 90 tablet 3     metoprolol tartrate (LOPRESSOR) 50 MG tablet Take 0.5 tablets (25 mg) by mouth 2 times daily Take one tablet (50 mg) each morning, and take 1/2 tablet (25 mg) at night 90 tablet 3     Nitroglycerin (NITROTAB SL) Place 0.4 mg under the tongue (Patient not taking: Reported on 10/20/2022)       polyethylene glycol (MIRALAX/GLYCOLAX) Packet Take 1 packet by mouth daily       psyllium (METAMUCIL) WAFR Take 2 Wafers by mouth daily       rosuvastatin (CRESTOR) 10 MG tablet Take 1 tablet (10 mg) by mouth daily 90 tablet 3     umeclidinium (INCRUSE ELLIPTA) 62.5 MCG/INH inhaler Inhale 1 puff into the lungs daily (Patient not taking: Reported on 10/20/2022)         Allergies   Allergen Reactions     Zocor [Hmg-Coa-R Inhibitors]        REVIEW OF SYSTEMS:  CONSTITUTIONAL:  NEGATIVE for fever, chills, change in weight, not feeling tired  SKIN:  NEGATIVE for worrisome rashes, no skin lumps, no skin ulcers and no non-healing wounds  EYES:  NEGATIVE for vision changes or irritation.  ENT/MOUTH:  NEGATIVE.  No hearing loss, no hoarseness, no difficulty swallowing.  RESP:  NEGATIVE. No cough or shortness of breath.  CV:  NEGATIVE for chest pain,  "palpitations or peripheral edema  GI:  NEGATIVE for nausea, abdominal pain, heartburn, or change in bowel habits  :  Negative. No dysuria, no hematuria  MUSCULOSKELETAL:  See HPI above  NEURO:  NEGATIVE . No headaches, no dizziness,  no numbness  ENDOCRINE:  NEGATIVE for temperature intolerance, skin/hair changes  HEME/ALLERGY/IMMUNE:  NEGATIVE for bleeding problems  PSYCHIATRIC:  NEGATIVE. no anxiety, no depression.     Exam:  Vitals: /72   Pulse 76   Resp 20   Ht 1.518 m (4' 11.75\")   Wt 54 kg (119 lb)   BMI 23.44 kg/m    BMI= Body mass index is 23.44 kg/m .  Constitutional:  healthy, alert and no distress  Neuro: Alert and Oriented x 3, no focal defects.  Psych: Affect normal   Respiratory: Breathing not labored.  Cardiovascular: normal peripheral pulses  Lymph: no adenopathy  Skin: No rashes,worrisome lesions or skin problems  She has full range of motion of both shoulders but does have pain with internal and external rotation on the right compared to the left.  She has weakness of the left shoulder on resisted external rotation.  She has some pain with resisted internal and external rotation on the right, but actually has better strength than the left.  She has some pain with empty can test and blocking test on the right.  She has a bit more weakness on the left.  Sensation, motor and circulation are intact.    Assessment:  Right shoulder impingement with possible rotator cuff tendinosis.  Likely old left rotator cuff tear without pain.    Plan: At 89 we would be unlikely to want to proceed with rotator cuff repair.  Given this I feel symptomatic treatment is indicated here.  Patient desires injection today of right shoulder(s).  Risks, benefits, potential complications and alternatives were discussed.  With the patient's consent, sterile prep was performed of right shoulder(s).  Right shoulder was injected with Kenalog 40 mg and lidocaine at shoulder subacromial space from the posterolateral " approach .     They were wondering if strengthening would help, and will refer to physical therapy for this.  Medication as needed for pain.

## 2022-12-01 ENCOUNTER — THERAPY VISIT (OUTPATIENT)
Dept: PHYSICAL THERAPY | Facility: CLINIC | Age: 87
End: 2022-12-01
Attending: ORTHOPAEDIC SURGERY
Payer: COMMERCIAL

## 2022-12-01 DIAGNOSIS — M25.511 CHRONIC RIGHT SHOULDER PAIN: ICD-10-CM

## 2022-12-01 DIAGNOSIS — G89.29 CHRONIC RIGHT SHOULDER PAIN: ICD-10-CM

## 2022-12-01 DIAGNOSIS — S49.91XA SHOULDER INJURY, RIGHT, INITIAL ENCOUNTER: ICD-10-CM

## 2022-12-01 DIAGNOSIS — R29.898 WEAKNESS OF SHOULDER: ICD-10-CM

## 2022-12-01 PROCEDURE — 97161 PT EVAL LOW COMPLEX 20 MIN: CPT | Mod: GP | Performed by: PHYSICAL THERAPIST

## 2022-12-01 PROCEDURE — 97110 THERAPEUTIC EXERCISES: CPT | Mod: GP | Performed by: PHYSICAL THERAPIST

## 2022-12-01 NOTE — PROGRESS NOTES
[facility]    OUTPATIENT Physical Therapy ORTHOPEDIC EVALUATION  PLAN OF TREATMENT FOR OUTPATIENT REHABILITATION  (COMPLETE FOR INITIAL CLAIMS ONLY)  Patient's Last Name, First Name, M.I.  YOB: 1932  Ingrid Culver    Provider s Name:  [facility]   Medical Record No.  [id number]   Start of Care Date:  12/01/22   Onset Date:   11/28/22 (provider referral)   Treatment Diagnosis:  L shoulder pain - rotator cuff tear Medical Diagnosis:     Shoulder injury, right, initial encounter  Weakness of shoulder       Goals:     12/01/22 0500   Body Part   Goals listed below are for R shoulder   Goal #1   Goal #1 reaching   Previous Functional Level No restrictions   Current Functional Level Can reach    Performance level abduction with painful arc, drop arm under resistance   STG Target Performance Reach    Performance level abduction x 1# x 10 reps no increase in pain   Rationale for dressing   Due date 12/22/22   LTG Target Performance Reach   Performance Level abduction x 3# x 10 reps no increase in pain, able to reach behind back for dressing with no more than mild discomfort (self reported)   Rationale for dressing;for meal preparation   Due date 03/01/23       Therapy Frequency:  1x/week tapering to every other week  Predicted Duration of Therapy Intervention:  12 weeks    NICOLE GUSTAFSON, PT                 I CERTIFY THE NEED FOR THESE SERVICES FURNISHED UNDER        THIS PLAN OF TREATMENT AND WHILE UNDER MY CARE     (Physician attestation of this document indicates review and certification of the therapy plan).                     Certification Date From:  12/01/22   Certification Date To:  03/01/23    Referring Provider:  Yunior Quiroga    Initial Assessment        See Epic Evaluation SOC Date: 12/01/22

## 2022-12-01 NOTE — PROGRESS NOTES
[facility] Physical Therapy Initial Evaluation  12/1/2022     Patient's Name: Ingrid Culver  Referring Provider: Yunior Quiroga  Visit Diagnosis: No diagnosis found.  Payor: [facility] / Plan: [facility] MEDICARE ADVANTAGE / Product Type: Medicare /     Precautions/Restrictions/MD instructions: 11/28/22 evaluate and treat    Therapist ASSESSMENT  Ingrid Culver is a 89 year old female patient presenting to Physical Therapy with R shoulder pain. Patient demonstrates drop arm, increased external rotation, decreased internal rotation strength. Signs and symptoms are consistent with rotator cuff tear. These impairments limit their ability to dress, reach overhead, reach behind for cares. Skilled PT services are necessary in order to reduce impairments and improve independent function.      SUBJECTIVE   Ingrid Culver is a 89 year old female who presents to outpatient physical therapy for evaluation. Her symptoms consist of:  1. R shoulder pain  Patient is accompanied by her daughter Loni harkins    Patient Comments (HPI): She reports that her symptoms began several months ago. She notices sharp, transient pain in her lateral or anterior shoulder when she reaches behind her to pull up her pants, or outward to grab a dish, or upward to lift something on/off a high shelf. She did have a fall but she and her daughter disagree on if it was this or last summer. She saw Dr. Quiroga for consideration of conservative vs operative tx and they decided on injection and PT, as she is overall doing quite well. Since the injection last week, things have improved    She denies red flags, including Numbness.  She reports the following red flags: none.    Aggravating Factors: reaching behind, functional IR, lifting overhead and lifting/carrying  Relieving Factors: rest    Previous Treatments: Injection (11/28 with Dr. Quiroga)    General health as reported by patient: good.    PMH/Review of Systems: COPD, hx of cerebral  infarction.I briefly reviewed the review of systems as noted on the health history form.  I am only responding to those symptoms which are directly relevant the specific indication for my consultation. I recommended the patient follow up with their primary care referring provider to pursue any other symptoms which may be of concern.     Surgical history/trauma: See EMR. She denies any significant current illness or recent hospital admissions. She denies any regional implanted devices.  Imaging:  Results for orders placed or performed in visit on 11/04/22   XR Shoulder Right G/E 3 Views    Narrative    XR SHOULDER RIGHT G/E 3 VIEWS 11/4/2022 3:46 PM    HISTORY: Acute pain of right shoulder    COMPARISON: None.      Impression    IMPRESSION: No fracture or dislocation. No degenerative changes.    GUERA MILES MD         SYSTEM ID:  [id number]     Medications: See EMR    PERTINENT MEDICAL / SURGICAL HISTORY:   Patient Active Problem List   Diagnosis     SHOULDER IMPINGEMENT - left     Hyperlipidemia LDL goal <130     Insomnia     Trigger finger, acquired - right ring     Trigger thumb - right     Bronchiectasis (H)     Cerebral infarction (H)     Vitreous syneresis     PVD (posterior vitreous detachment)     Pseudophakia OU     Esotropia chronic c prism     Dry eyes, bilateral     Other idiopathic scoliosis, thoracolumbar region     Ganglion cyst of finger of right hand     Hypertension goal BP (blood pressure) < 150/90     Hypothyroidism due to acquired atrophy of thyroid     Coronary artery disease involving native coronary artery of native heart, angina presence unspecified     Peripheral arterial disease (H)     COPD (chronic obstructive pulmonary disease) (H)     Age-related osteoporosis without current pathological fracture     Impingement syndrome of right shoulder     Past Surgical History:   Procedure Laterality Date     APPENDECTOMY       BREAST BIOPSY, RT/LT      Breat Biopsy RT/LT     CARDIAC SURGERY   12/03/2017    STINT     CATARACT IOL, RT/LT       COLONOSCOPY  06/00   And 2008    diverticulosis     COLONOSCOPY  1-13-12     ENDOSCOPY  1-13-12     EXTRACAPSULAR CATARACT EXTRATION WITH INTRAOCULAR LENS IMPLANT  01/2000    both eyes     FLEXIBLE SIGMOIDOSCOPY  06/95,08/98     JOINT REPLACEMTN, KNEE RT/LT  92    right     ZZC TOTAL KNEE ARTHROPLASTY  06/25/03    left poly exchange       Occupation: retired // Job duties: would like to do more of the household chores but her son does quite a bit  Social history/Living Environment: lives with her son. He helps with yardwork, housework.  Current exercise regimen/Recreational activities: Does a cubi bike 2x/day for 15 min; used to go to the gym before Covid  Possible barriers impacting outcomes: none    Patient Self-identified Goal: Ingrid Culver would like to get back to regular volunteering activities and some household chores.        OBJECTIVE  Observation: forward head, rounded shoulders, increased thoracic kyphosis and scoliosis right  Gait: cautious  Transfers: increased time but independent  Screening (regional interdependence): Cervical screen negative (ROM with overpressure full) - noncontributory  Lumbar screen - R thoracolumbar scoliosis, some medial winging of L scapula. R sidebending is painful on the right.  Range of Motion: affected side: right  symmetric Active shoulder flexion, abduction, internal rotation. External rotation R is approx 100 deg at her side. On the L is approx 60 deg at her side. Painful arc present for abudction  Neuro Screen: not assessed  Strength:   Strong and   Pain FREE Strong and   PainFUL   Infraspinatus (shoulder ER at side)  Biceps (shoulder and elbow flexion)     Teres minor (shoulder ER at 90 deg scaption)   Weak and   Pain FREE Weak and   PainFUL   none   Supraspinatus (shoulder abduction)  Subscapularis (shoulder IR at side)     Functional movement: not assessed  Palpation: Tender to palpation at: greater tuberosity  right  Segmental/Joint Mobility Assessment: not assessed  Special Tests:  Positive: Rotator cuff: Drop arm and Belly press   Negative: Rotator cuff: Infraspinatus testing (weakness w/ ER at side) and Hornblower       ASSESSMENT/PLAN  Patient is a 89 year old female with right side shoulder complaints.    Patient has the following significant findings with corresponding treatment plan.                Diagnosis 1:  R shoulder pain  Pain -  hot/cold therapy, manual therapy, splint/taping/bracing/orthotics, self management and education  Decreased strength - therapeutic exercise, therapeutic activities and home program  Impaired muscle performance - neuro re-education and home program  Decreased function - therapeutic activities and home program    Therapy Evaluation Codes:   Cumulative Therapy Evaluation is: Low complexity.   Previous and current functional limitations:  (See Goal Flow Sheet for this information)    Short term and Long term goals: (See Goal Flow Sheet for this information)     Communication ability:  Patient appears to be able to clearly communicate and understand verbal and written communication and follow directions correctly.    Treatment Explanation - The following has been discussed with the patient:   RX ordered/plan of care  Anticipated outcomes  Possible risks and side effects    This patient would benefit from PT intervention to resume normal activities.   Rehab potential is good.    Frequency:  1 X week, once daily  Duration:  for 4 weeks tapering to 2 X a month over 8 weeks  Discharge Plan:  Achieve all LTG.  Independent in home treatment program.  Reach maximal therapeutic benefit.    Please refer to the daily flowsheet for treatment today, total treatment time and time spent performing 1:1 timed codes.     *Text entry may be via Dragon Dictation software in real-time. Efforts are made to edit for clarity.     Bety Gibbons, PT, DPT, OCS  [facility], [place]

## 2022-12-09 ENCOUNTER — THERAPY VISIT (OUTPATIENT)
Dept: PHYSICAL THERAPY | Facility: CLINIC | Age: 87
End: 2022-12-09
Attending: ORTHOPAEDIC SURGERY
Payer: COMMERCIAL

## 2022-12-09 DIAGNOSIS — G89.29 CHRONIC RIGHT SHOULDER PAIN: Primary | ICD-10-CM

## 2022-12-09 DIAGNOSIS — M25.511 CHRONIC RIGHT SHOULDER PAIN: Primary | ICD-10-CM

## 2022-12-09 PROCEDURE — 97110 THERAPEUTIC EXERCISES: CPT | Mod: GP | Performed by: PHYSICAL THERAPIST

## 2022-12-19 ENCOUNTER — THERAPY VISIT (OUTPATIENT)
Dept: PHYSICAL THERAPY | Facility: CLINIC | Age: 87
End: 2022-12-19
Attending: ORTHOPAEDIC SURGERY
Payer: COMMERCIAL

## 2022-12-19 DIAGNOSIS — M25.511 CHRONIC RIGHT SHOULDER PAIN: Primary | ICD-10-CM

## 2022-12-19 DIAGNOSIS — G89.29 CHRONIC RIGHT SHOULDER PAIN: Primary | ICD-10-CM

## 2022-12-19 PROCEDURE — 97140 MANUAL THERAPY 1/> REGIONS: CPT | Mod: GP | Performed by: PHYSICAL THERAPIST

## 2022-12-19 PROCEDURE — 97110 THERAPEUTIC EXERCISES: CPT | Mod: GP | Performed by: PHYSICAL THERAPIST

## 2023-01-05 ENCOUNTER — TELEPHONE (OUTPATIENT)
Dept: FAMILY MEDICINE | Facility: CLINIC | Age: 88
End: 2023-01-05

## 2023-01-05 NOTE — TELEPHONE ENCOUNTER
Spoke with dtr and notified of below. Verbalize understanding.     Thanks,  Peggy RN  [facility]

## 2023-01-05 NOTE — TELEPHONE ENCOUNTER
Daughter Kim calling, patient was started on tamiflu yesterday from the hospital. She is feeling very ill from it. Daughter reports the patient's symptoms started 12/28/23 and does not see the benefit of taking the medication after that long.     Daughter request a message be sent to PCP to see if patient can stop taking the tamiflu?    Thank you,  China Tran RN  [facility], [place]

## 2023-01-12 ENCOUNTER — OFFICE VISIT (OUTPATIENT)
Dept: FAMILY MEDICINE | Facility: CLINIC | Age: 88
End: 2023-01-12
Payer: COMMERCIAL

## 2023-01-12 VITALS
HEART RATE: 65 BPM | RESPIRATION RATE: 16 BRPM | HEIGHT: 60 IN | WEIGHT: 119 LBS | TEMPERATURE: 97.7 F | DIASTOLIC BLOOD PRESSURE: 84 MMHG | BODY MASS INDEX: 23.36 KG/M2 | OXYGEN SATURATION: 95 % | SYSTOLIC BLOOD PRESSURE: 132 MMHG

## 2023-01-12 DIAGNOSIS — J44.9 CHRONIC OBSTRUCTIVE PULMONARY DISEASE, UNSPECIFIED COPD TYPE (H): ICD-10-CM

## 2023-01-12 DIAGNOSIS — Z09 HOSPITAL DISCHARGE FOLLOW-UP: Primary | ICD-10-CM

## 2023-01-12 DIAGNOSIS — J47.1 BRONCHIECTASIS WITH ACUTE EXACERBATION (H): ICD-10-CM

## 2023-01-12 DIAGNOSIS — J10.1 INFLUENZA A: ICD-10-CM

## 2023-01-12 DIAGNOSIS — J10.00 PNEUMONIA DUE TO INFLUENZA A VIRUS: ICD-10-CM

## 2023-01-12 DIAGNOSIS — D64.9 LOW HEMOGLOBIN: ICD-10-CM

## 2023-01-12 LAB
ERYTHROCYTE [DISTWIDTH] IN BLOOD BY AUTOMATED COUNT: 15 % (ref 10–15)
HCT VFR BLD AUTO: 36.1 % (ref 35–47)
HGB BLD-MCNC: 11.4 G/DL (ref 11.7–15.7)
MCH RBC QN AUTO: 29.8 PG (ref 26.5–33)
MCHC RBC AUTO-ENTMCNC: 31.6 G/DL (ref 31.5–36.5)
MCV RBC AUTO: 95 FL (ref 78–100)
PLATELET # BLD AUTO: 321 10E3/UL (ref 150–450)
RBC # BLD AUTO: 3.82 10E6/UL (ref 3.8–5.2)
WBC # BLD AUTO: 7.8 10E3/UL (ref 4–11)

## 2023-01-12 PROCEDURE — 36415 COLL VENOUS BLD VENIPUNCTURE: CPT | Performed by: NURSE PRACTITIONER

## 2023-01-12 PROCEDURE — 85027 COMPLETE CBC AUTOMATED: CPT | Performed by: NURSE PRACTITIONER

## 2023-01-12 PROCEDURE — 99214 OFFICE O/P EST MOD 30 MIN: CPT | Performed by: NURSE PRACTITIONER

## 2023-01-12 RX ORDER — MECOBALAMIN 5000 MCG
15 TABLET,DISINTEGRATING ORAL
COMMUNITY

## 2023-01-12 ASSESSMENT — PATIENT HEALTH QUESTIONNAIRE - PHQ9
SUM OF ALL RESPONSES TO PHQ QUESTIONS 1-9: 11
SUM OF ALL RESPONSES TO PHQ QUESTIONS 1-9: 11
10. IF YOU CHECKED OFF ANY PROBLEMS, HOW DIFFICULT HAVE THESE PROBLEMS MADE IT FOR YOU TO DO YOUR WORK, TAKE CARE OF THINGS AT HOME, OR GET ALONG WITH OTHER PEOPLE: NOT DIFFICULT AT ALL

## 2023-01-12 ASSESSMENT — ENCOUNTER SYMPTOMS: SHORTNESS OF BREATH: 1

## 2023-01-12 NOTE — PATIENT INSTRUCTIONS
CBC today to recheck hemoglobin. If it continues to be low, I would recommend starting iron daily until your primary care appointment next month.     If you start iron, it can cause constipation.

## 2023-01-12 NOTE — PROGRESS NOTES
Assessment & Plan     Hospital discharge follow-up  Improved. No concerns from patient.     Influenza A  Improved. Stopped tamiflu due to vomiting.      Pneumonia due to influenza A virus  Improved. Finished abx course. Discussed s/s of pna reoccurrence and when to follow-up. She has physical with PCP in 1 month.     Low hemoglobin  Low during hospital stay. Could be due to IVF. Labs drawn today to recheck. If low, recommended she start taking iron OTC and recheck labs with PCP in one month. Discussed that iron supplements can cause constipation and discussed how to prevent/treat.     - CBC with platelets    Bronchiectasis with acute exacerbation (H)  Stable.     Chronic obstructive pulmonary disease, unspecified COPD type (H)  Stable.              MED REC REQUIRED  Post Medication Reconciliation Status:  Discharge medications reconciled and changed, see notes/orders    Depression Screening Follow Up    PHQ 1/12/2023   PHQ-9 Total Score 11   Q9: Thoughts of better off dead/self-harm past 2 weeks Not at all         Follow Up Actions Taken       Patient Instructions   CBC today to recheck hemoglobin. If it continues to be low, I would recommend starting iron daily until your primary care appointment next month.     If you start iron, it can cause constipation.       Return if symptoms worsen or fail to improve.    CIRILO [facility]  [facility]    Earl Quintero is a 89 year old, presenting for the following health issues:  Hospital F/U      [facility]       Hospital Follow-up Visit:    Hospital/Nursing Home/IP Rehab Facility: [facility]  Date of Admission: 1/2/23  Date of Discharge: 1/4/23  Reason(s) for Admission: Influenza A and pnuemonia    Was your hospitalization related to COVID-19? No   Problems taking medications regularly:  None  Medication changes since discharge: Oseltamivir phosphate was prescribed,   Problems adhering to non-medication therapy:  None    Summary of  "hospitalization:  CareEverywhere information obtained and reviewed  Diagnostic Tests/Treatments reviewed.  Follow up needed: none  Other Healthcare Providers Involved in Patient s Care:         None  Update since discharge: improved.   Plan of care communicated with patient and family     HOSPITALIST DISCHARGE SUMMARY   [facility]     Admission Date: 1/2/2023  Discharge Date: 1/4/2023    Discharge Plan: RYAN MELLO was discharged to home.    Principal Diagnosis     influenza A and community acquired pneumonia    Hospital Problem List   Principal Problem:  Influenza A  Active Problems:  Bronchiectasis without acute exacerbation (HC)  Hypothyroidism  Pneumonia of left lower lobe due to infectious organism  KYM (acute kidney injury) (HC)  Coronary artery disease involving native coronary artery of native heart without angina pectoris    ADDITIONAL COMMENTS REGARDING DIAGNOSIS SPECIFICITY  Additional Diagnosis Information           Hospital Course     89F w/ pmh of hypothyroidism, HTN, HLD, unstable angina presented with dyspnea, and feeling unwell found to have KYM, and influenza A positive. pt initially treated with IV abx, on discharge prescribed finishing course of azithromycin, and tamiflu. KYM resolved.     Recommendations for Outpatient Provider   PCP: Radha Sheets MD     Recommendations for outpatient provider   Specific recommendations to be addressed at the follow up visit:  routine post-op follow-up; pt had uncomplicated course but is at higher risk for readmission.    Medication regimen changes: see \"Hospital Course\" above.    Follow-up labs/imaging: none    Other specialty follow-up not included in DC orders: None    Special considerations: none.    Functional evaluations:   Fall Risk: Total Score (If 5 or > is High Risk): 3 (01/04/23 0900)  NuDESC (>/=2 abnormal): 0 (01/04/23 0900)   MOCA: // SLUMS:         Discharge Medications       Your Home Medicines     START taking these medicines "   Instructions   Oseltamivir Phosphate 30 mg Cap  For diagnoses: Influenza A  Take 1 Capsule (30 mg) by mouth two times daily for 4 days.      CHANGE how you take these medicines   Instructions   * azithromycin 250 mg tablet  For diagnoses: Pneumonia due to infectious organism, unspecified laterality, unspecified part of lung  What changed: You were already taking a medication with the same name, and this prescription was added. Make sure you understand how and when to take each.  Commonly known as: ZITHROMAX  Take 500 mg (2 tabs) by mouth on day 1, then 250 mg (1 tab) daily for days 2-5.    * azithromycin 250 mg tablet  What changed: Another medication with the same name was added. Make sure you understand how and when to take each.  Commonly known as: ZITHROMAX  Take 250 mg by mouth every Monday, Wednesday and Friday. in the afternoon      * This list has 2 medication(s) that are the same as other medications prescribed for you. Read the directions carefully, and ask your doctor or other care provider to review them with you.         CONTINUE taking these medicines   Instructions   acetaminophen 500 mg tablet  Commonly known as: TYLENOL EXTRA STRGTH  Take 1,000 mg by mouth two times daily. Max acetaminophen dose: 4000mg in 24 hrs.    albuterol-ipratropium (2.5-0.5 mg) in 3 mL NEBULIZATION solution  Commonly known as: DUONEB  Inhale 1 Neb via a nebulizer 2 times daily. With the pulmonary vest    aspirin chewable 81 mg chewable tablet  For diagnoses: Elevated troponin  Take 1 tablet by mouth or nasogastric tube once daily.    docusate 100 mg capsule  Commonly known as: COLACE  Take 100 mg by mouth at bedtime.    isosorbide mononitrate 30 mg extended release tablet 24 Hour  For diagnoses: Coronary artery disease involving native coronary artery of native heart without angina pectoris, Stenosis of right carotid artery  Commonly known as: IMDUR  TAKE ONE TABLET BY MOUTH ONCE DAILY    lansoprazole 15 mg  capsule  Commonly known as: PREVACID  Take 15 mg by mouth once daily with evening meal.    levothyroxine 25 mcg tablet  For diagnoses: Other specified hypothyroidism  Commonly known as: SYNTHROID  Take 1 Tablet (25 mcg) by mouth before breakfast.    metoprolol tartrate 50 mg tablet  Commonly known as: LOPRESSOR  Take 25 mg by mouth two times daily.    nitroglycerin 0.4 mg sublingual tablet  For diagnoses: Intermittent chest pain  Commonly known as: NITROSTAT  Place 1 Tablet (0.4 mg) under the tongue every 5 minutes if needed for Chest Pain (first choice for chest pain).    polyethylene glycol 17 g packet  Commonly known as: MIRALAX; GLYCOLAX  Take 1 Packet by mouth at bedtime.    psyllium husk (with sugar) 3.4 gram packet  Commonly known as: METAMUCIL  Take 1 Packet by mouth every morning.    rosuvastatin 10 mg tablet  For diagnoses: Elevated troponin  Commonly known as: CRESTOR  Take 1 Tablet (10 mg) by mouth at bedtime.      ---------------------------------------------------  Additional provider notes: Patient presents in clinic for scheduled hospital discharge follow-up. She states she is feeling better and back to baseline. Denies fevers or respiratory distress. States she has baseline SOB with activity, not worse than normal. Medications reviewed. She stopped tamiflu due to vomiting. She finished the z-ivet prescribed by the hospital and now is back on azithromycin MWF for bronchiectasis ppx.     Low hemoglobin: noted during admission. Patient does report fatigue.     Allergies   Allergen Reactions     Zocor [Hmg-Coa-R Inhibitors]        Current Outpatient Medications   Medication     Acetaminophen (TYLENOL PO)     aspirin 81 MG tablet     azithromycin (ZITHROMAX) 250 MG tablet     Docusate Sodium (COLACE PO)     ipratropium - albuterol 0.5 mg/2.5 mg/3 mL (DUONEB) 0.5-2.5 (3) MG/3ML neb solution     isosorbide dinitrate (ISORDIL) 30 MG tablet     levothyroxine (SYNTHROID/LEVOTHROID) 25 MCG tablet     metoprolol  "tartrate (LOPRESSOR) 50 MG tablet     polyethylene glycol (MIRALAX/GLYCOLAX) Packet     psyllium (METAMUCIL) WAFR     rosuvastatin (CRESTOR) 10 MG tablet     LANsoprazole (PREVACID) 15 MG DR capsule     Nitroglycerin (NITROTAB SL)     umeclidinium (INCRUSE ELLIPTA) 62.5 MCG/INH inhaler     Current Facility-Administered Medications   Medication     lidocaine 1 % injection 5 mL     triamcinolone (KENALOG-40) injection 40 mg       Past Medical History:   Diagnosis Date     Allergies     idiopathic, anaphyllactic rx's     Bronchiectasis     cough on inhalers recurrent infections     Cataract      Cerebral infarction (H)      COPD (chronic obstructive pulmonary disease) (H)      Disorders of lipoid metabolism     hx of on low fat diet     Esotropia chronic c prism 11/6/2013     Heart disease      Hypertension goal BP (blood pressure) < 150/90      Impingement syndrome of right shoulder 11/28/2022     Knee pain      Other bone/cartilage disease      Post-Nasal Drainage      Unspecified asthma(493.90)     Asthma     Unspecified hypothyroidism      Uterine fibroid     10/08 U/s shows 4X2.9X3/3            Review of Systems   HENT: Negative.    Respiratory: Positive for shortness of breath (baseline).    All other systems reviewed and are negative.           Objective    /84 (BP Location: Right arm, Patient Position: Chair, Cuff Size: Adult Regular)   Pulse 65   Temp 97.7  F (36.5  C) (Tympanic)   Resp 16   Ht 1.517 m (4' 11.72\")   Wt 54 kg (119 lb)   SpO2 95%   BMI 23.46 kg/m    Body mass index is 23.46 kg/m .  Physical Exam  Vitals reviewed.   Constitutional:       General: She is not in acute distress.     Appearance: Normal appearance. She is normal weight. She is not ill-appearing or toxic-appearing.   HENT:      Head: Normocephalic and atraumatic.      Right Ear: Tympanic membrane, ear canal and external ear normal. There is no impacted cerumen.      Left Ear: Tympanic membrane, ear canal and external ear " normal. There is no impacted cerumen.      Nose: Nose normal.      Mouth/Throat:      Mouth: Mucous membranes are moist.      Pharynx: Oropharynx is clear. No posterior oropharyngeal erythema.   Cardiovascular:      Rate and Rhythm: Normal rate and regular rhythm.      Pulses: Normal pulses.      Heart sounds: Normal heart sounds.   Pulmonary:      Effort: Pulmonary effort is normal.      Breath sounds: Rales (bilat bases) present.   Skin:     General: Skin is warm and dry.   Neurological:      Mental Status: She is alert and oriented to person, place, and time.   Psychiatric:         Behavior: Behavior normal.                            Answers for HPI/ROS submitted by the patient on 1/12/2023  If you checked off any problems, how difficult have these problems made it for you to do your work, take care of things at home, or get along with other people?: Not difficult at all  PHQ9 TOTAL SCORE: 11

## 2023-01-26 ENCOUNTER — OFFICE VISIT (OUTPATIENT)
Dept: OPHTHALMOLOGY | Facility: CLINIC | Age: 88
End: 2023-01-26
Payer: COMMERCIAL

## 2023-01-26 DIAGNOSIS — H50.00 ESOTROPIA: ICD-10-CM

## 2023-01-26 DIAGNOSIS — H43.813 POSTERIOR VITREOUS DETACHMENT OF BOTH EYES: ICD-10-CM

## 2023-01-26 DIAGNOSIS — Z96.1 PSEUDOPHAKIA: Primary | ICD-10-CM

## 2023-01-26 DIAGNOSIS — H52.223 MYOPIA OF BOTH EYES WITH REGULAR ASTIGMATISM AND PRESBYOPIA: ICD-10-CM

## 2023-01-26 DIAGNOSIS — H04.123 DRY EYES, BILATERAL: ICD-10-CM

## 2023-01-26 DIAGNOSIS — H52.4 MYOPIA OF BOTH EYES WITH REGULAR ASTIGMATISM AND PRESBYOPIA: ICD-10-CM

## 2023-01-26 DIAGNOSIS — H52.13 MYOPIA OF BOTH EYES WITH REGULAR ASTIGMATISM AND PRESBYOPIA: ICD-10-CM

## 2023-01-26 DIAGNOSIS — H26.493 BILATERAL POSTERIOR CAPSULAR OPACIFICATION: ICD-10-CM

## 2023-01-26 PROCEDURE — 92015 DETERMINE REFRACTIVE STATE: CPT | Performed by: STUDENT IN AN ORGANIZED HEALTH CARE EDUCATION/TRAINING PROGRAM

## 2023-01-26 PROCEDURE — 92014 COMPRE OPH EXAM EST PT 1/>: CPT | Performed by: STUDENT IN AN ORGANIZED HEALTH CARE EDUCATION/TRAINING PROGRAM

## 2023-01-26 ASSESSMENT — CONF VISUAL FIELD
OS_SUPERIOR_TEMPORAL_RESTRICTION: 0
OD_INFERIOR_TEMPORAL_RESTRICTION: 0
OS_INFERIOR_TEMPORAL_RESTRICTION: 0
OD_SUPERIOR_TEMPORAL_RESTRICTION: 0
OS_SUPERIOR_NASAL_RESTRICTION: 0
OD_SUPERIOR_NASAL_RESTRICTION: 0
OD_NORMAL: 1
OD_INFERIOR_NASAL_RESTRICTION: 0
OS_INFERIOR_NASAL_RESTRICTION: 0
OS_NORMAL: 1

## 2023-01-26 ASSESSMENT — VISUAL ACUITY
OD_CC: 20/30
OS_CC: 20/30
METHOD: SNELLEN - LINEAR
CORRECTION_TYPE: GLASSES

## 2023-01-26 ASSESSMENT — SLIT LAMP EXAM - LIDS
COMMENTS: LEVATOR DEHISCENSE
COMMENTS: LEVATOR DEHISCENSE

## 2023-01-26 ASSESSMENT — REFRACTION_WEARINGRX
OS_ADD: +3.00
OD_CYLINDER: +0.50
OS_SPHERE: -1.00
OS_HPRISM: 9.5
OD_ADD: +3.00
OS_AXIS: 180
OD_HPRISM: 9.5
OD_VPRISM: 2.5
OD_AXIS: 155
OS_HBASE: OUT
OD_HBASE: OUT
OS_CYLINDER: +1.25
OD_VBASE: UP
OD_SPHERE: -1.50

## 2023-01-26 ASSESSMENT — REFRACTION_MANIFEST
OD_ADD: +3.00
OS_ADD: +3.00
OS_HPRISM: 9.5
OS_CYLINDER: +1.25
OD_AXIS: 155
OD_CYLINDER: +1.00
OD_VPRISM: 2.5
OS_SPHERE: -1.00
OD_SPHERE: -2.00
OS_AXIS: 180
OD_HPRISM: 9.5

## 2023-01-26 ASSESSMENT — TONOMETRY
IOP_METHOD: APPLANATION
OD_IOP_MMHG: 18
OS_IOP_MMHG: 19

## 2023-01-26 ASSESSMENT — REFRACTION_FINALRX
OD_HBASE: OUT
OS_HBASE: OUT
OS_HPRISM: 9.5
OD_HPRISM: 9.5
OD_VPRISM: 2.5

## 2023-01-26 ASSESSMENT — EXTERNAL EXAM - LEFT EYE: OS_EXAM: NORMAL

## 2023-01-26 ASSESSMENT — CUP TO DISC RATIO
OD_RATIO: 0.4
OS_RATIO: 0.35

## 2023-01-26 ASSESSMENT — EXTERNAL EXAM - RIGHT EYE: OD_EXAM: NORMAL

## 2023-01-26 NOTE — PATIENT INSTRUCTIONS
Recommend YAG capsulotomy right eye then left eye to help clear the vision    Porfirio Rodriguez MD  [phone]

## 2023-01-26 NOTE — LETTER
1/26/2023         RE: Ingrid Culver  [address]  [facility] [address]        Dear Colleague,    Thank you for referring your patient, Ingrid Culver, to the [facility]. Please see a copy of my visit note below.    Current Eye Medications:  None    Subjective:  Here for Complete Eye Exam.  Patient notes her vision has decreased. She is unable to drive now due to her vision.  She complains of double vision in the distance only. Side by side images.   She is doing well reading and doing close up work.     NIKA Byrnes  2:13 PM 01/26/2023    Objective:  See Ophthalmology Exam.      Assessment:  Ingrid Culver is a 89 year old female who presents with:   Encounter Diagnoses   Name Primary?     Pseudophakia OU Yes     Bilateral posterior capsular opacification Visually significant both eyes.      Esotropia chronic c prism      Posterior vitreous detachment of both eyes      Dry eyes, bilateral      Myopia of both eyes with regular astigmatism and presbyopia        Plan:  Recommend YAG capsulotomy right eye then left eye to help clear the vision    Porfirio Rodriguez MD  [phone]          Again, thank you for allowing me to participate in the care of your patient.        Sincerely,        Porfirio Rodriguez MD

## 2023-01-26 NOTE — PROGRESS NOTES
Current Eye Medications:  None    Subjective:  Here for Complete Eye Exam.  Patient notes her vision has decreased. She is unable to drive now due to her vision.  She complains of double vision in the distance only. Side by side images.   She is doing well reading and doing close up work.     NIKA Byrnes  2:13 PM 01/26/2023    Objective:  See Ophthalmology Exam.      Assessment:  Ingrid Culver is a 89 year old female who presents with:   Encounter Diagnoses   Name Primary?     Pseudophakia OU Yes     Bilateral posterior capsular opacification Visually significant both eyes.      Esotropia chronic c prism      Posterior vitreous detachment of both eyes      Dry eyes, bilateral      Myopia of both eyes with regular astigmatism and presbyopia        Plan:  Recommend YAG capsulotomy right eye then left eye to help clear the vision    Porfirio Rodriguez MD  [phone]

## 2023-01-31 ENCOUNTER — OFFICE VISIT (OUTPATIENT)
Dept: OPHTHALMOLOGY | Facility: CLINIC | Age: 88
End: 2023-01-31
Payer: COMMERCIAL

## 2023-01-31 DIAGNOSIS — H26.491 RIGHT POSTERIOR CAPSULAR OPACIFICATION: Primary | ICD-10-CM

## 2023-01-31 DIAGNOSIS — H04.123 DRY EYES, BILATERAL: ICD-10-CM

## 2023-01-31 DIAGNOSIS — Z96.1 PSEUDOPHAKIA: ICD-10-CM

## 2023-01-31 DIAGNOSIS — H50.00 ESOTROPIA: ICD-10-CM

## 2023-01-31 PROCEDURE — 99207 PR NO CHARGE LOS: CPT | Performed by: STUDENT IN AN ORGANIZED HEALTH CARE EDUCATION/TRAINING PROGRAM

## 2023-01-31 PROCEDURE — 66821 AFTER CATARACT LASER SURGERY: CPT | Mod: RT | Performed by: STUDENT IN AN ORGANIZED HEALTH CARE EDUCATION/TRAINING PROGRAM

## 2023-01-31 ASSESSMENT — VISUAL ACUITY
OD_CC+: +1
CORRECTION_TYPE: GLASSES
OD_CC: 20/40
OS_CC: 20/25
OS_CC+: -1
METHOD: SNELLEN - LINEAR

## 2023-01-31 ASSESSMENT — EXTERNAL EXAM - RIGHT EYE: OD_EXAM: NORMAL

## 2023-01-31 ASSESSMENT — TONOMETRY
OD_IOP_MMHG: 18
OD_IOP_MMHG: 17
IOP_METHOD: APPLANATION
IOP_METHOD: APPLANATION

## 2023-01-31 ASSESSMENT — SLIT LAMP EXAM - LIDS
COMMENTS: LEVATOR DEHISCENSE
COMMENTS: LEVATOR DEHISCENSE

## 2023-01-31 ASSESSMENT — EXTERNAL EXAM - LEFT EYE: OS_EXAM: NORMAL

## 2023-01-31 NOTE — PROGRESS NOTES
" Current Eye Medications:       Subjective:  Patient is here for a Yag Capsulotomy, right eye.  Consent signed.  She complains of increased foggy vision and diplopia, in her left eye, since her last visit with Dr. Rodriguez.     Objective:  See Ophthalmology Exam.       Assessment:  Ingrid Culver is a 89 year old female who presents with:   Encounter Diagnoses   Name Primary?     Right posterior capsular opacification YAG capsulotomy right eye performed today without complication.       Pseudophakia OU      Esotropia chronic c prism      Dry eyes, bilateral        Plan:  You may notice more floaters for the next few days.  Return in 1 week as scheduled for YAG cap laser left eye.    Use artificial tears up to four times a day in the left eye (like Refresh Optive, Systane Balance, TheraTears, or generic artificial tears are ok. Avoid \"get the red out\" drops).     Porfirio Rodriguez MD  [phone]      "

## 2023-01-31 NOTE — LETTER
"    1/31/2023         RE: Ingrid Culver  [address]  [facility] [address]        Dear Colleague,    Thank you for referring your patient, Ingrid Culver, to the [facility]. Please see a copy of my visit note below.     Current Eye Medications:       Subjective:  Patient is here for a Yag Capsulotomy, right eye.  Consent signed.  She complains of increased foggy vision and diplopia, in her left eye, since her last visit with Dr. Rodriguez.     Objective:  See Ophthalmology Exam.       Assessment:  Ingrid Culver is a 89 year old female who presents with:   Encounter Diagnoses   Name Primary?     Right posterior capsular opacification YAG capsulotomy right eye performed today without complication.       Pseudophakia OU      Esotropia chronic c prism      Dry eyes, bilateral        Plan:  You may notice more floaters for the next few days.  Return in 1 week as scheduled for YAG cap laser left eye.    Use artificial tears up to four times a day in the left eye (like Refresh Optive, Systane Balance, TheraTears, or generic artificial tears are ok. Avoid \"get the red out\" drops).     Porfirio Rodriguez MD  [phone]          Again, thank you for allowing me to participate in the care of your patient.        Sincerely,        Porfirio Rodriguez MD    "

## 2023-01-31 NOTE — PATIENT INSTRUCTIONS
"You may notice more floaters for the next few days.  Return in 1 week as scheduled for YAG cap laser left eye.    Use artificial tears up to four times a day in the left eye (like Refresh Optive, Systane Balance, TheraTears, or generic artificial tears are ok. Avoid \"get the red out\" drops).     Porfirio Rodriguez MD  [phone]    "

## 2023-02-02 PROBLEM — M25.511 CHRONIC RIGHT SHOULDER PAIN: Status: RESOLVED | Noted: 2022-12-01 | Resolved: 2023-02-02

## 2023-02-02 PROBLEM — G89.29 CHRONIC RIGHT SHOULDER PAIN: Status: RESOLVED | Noted: 2022-12-01 | Resolved: 2023-02-02

## 2023-02-02 NOTE — PROGRESS NOTES
DISCHARGE REPORT    Progress reporting period is from 12/1/2022 to 12/19/2022.       SUBJECTIVE  Subjective: Had trouble with a couple of the exercises. Shoulder has been a little more sore.    Current Pain level: 0/10.     Initial Pain level: 3/10.   Changes in function:  None  Adverse reaction to treatment or activity: None    OBJECTIVE  Changes noted in objective findings:  The objective findings below are from DOS 12/19/2022.    Observation - Cueing required with most exercises with education to keep exercises in a painfree range.     ASSESSMENT/PLAN  Updated problem list and treatment plan: Diagnosis 1:  Had trouble with a couple of the exercises. Shoulder has been a little more sore.   STG/LTGs have been met or progress has been made towards goals:  None  Assessment of Progress: The patient's condition is unchanged.  Self Management Plans:  Patient has been instructed in a home treatment program.  I have re-evaluated this patient and find that the nature, scope, duration and intensity of the therapy is appropriate for the medical condition of the patient.    Recommendations:  Pt has not returned to physical therapy in over 1 month and will be discharged to [facility] at this time.     Please refer to the daily flowsheet for treatment today, total treatment time and time spent performing 1:1 timed codes.

## 2023-02-07 ENCOUNTER — OFFICE VISIT (OUTPATIENT)
Dept: OPHTHALMOLOGY | Facility: CLINIC | Age: 88
End: 2023-02-07
Payer: COMMERCIAL

## 2023-02-07 DIAGNOSIS — H26.492 LEFT POSTERIOR CAPSULAR OPACIFICATION: ICD-10-CM

## 2023-02-07 DIAGNOSIS — H50.00 ESOTROPIA: ICD-10-CM

## 2023-02-07 DIAGNOSIS — H04.123 DRY EYES, BILATERAL: ICD-10-CM

## 2023-02-07 DIAGNOSIS — Z96.1 PSEUDOPHAKIA: Primary | ICD-10-CM

## 2023-02-07 PROCEDURE — 66821 AFTER CATARACT LASER SURGERY: CPT | Mod: 79 | Performed by: STUDENT IN AN ORGANIZED HEALTH CARE EDUCATION/TRAINING PROGRAM

## 2023-02-07 PROCEDURE — 99207 PR NO CHARGE LOS: CPT | Performed by: STUDENT IN AN ORGANIZED HEALTH CARE EDUCATION/TRAINING PROGRAM

## 2023-02-07 ASSESSMENT — VISUAL ACUITY
CORRECTION_TYPE: GLASSES
OD_CC+: -2
OS_CC: 20/25
METHOD: SNELLEN - LINEAR
OS_CC+: -2
OD_CC: 20/30

## 2023-02-07 ASSESSMENT — TONOMETRY
IOP_METHOD: APPLANATION
OS_IOP_MMHG: 20
IOP_METHOD: APPLANATION
OS_IOP_MMHG: 19

## 2023-02-07 ASSESSMENT — SLIT LAMP EXAM - LIDS
COMMENTS: LEVATOR DEHISCENSE
COMMENTS: LEVATOR DEHISCENSE

## 2023-02-07 ASSESSMENT — EXTERNAL EXAM - LEFT EYE: OS_EXAM: NORMAL

## 2023-02-07 ASSESSMENT — EXTERNAL EXAM - RIGHT EYE: OD_EXAM: NORMAL

## 2023-02-07 NOTE — LETTER
2/7/2023         RE: Ingrid Culver  [address] [facility] [address]        Dear Colleague,    Thank you for referring your patient, Ingrid Culver, to the [facility]. Please see a copy of my visit note below.     Current Eye Medications:  Systane - both eyes BID     Subjective:  Here for YAG Capsulotomy Left Eye today. Left eye vision is blurry. Desires YAG Cap left eye - consent signed.     S/p YAG Cap Right Eye (1/31/23) - notices right eye vision is better.     Objective:  See Ophthalmology Exam.      Assessment:  Ingrid Culver is a 89 year old female who presents with:   Encounter Diagnoses   Name Primary?     Pseudophakia OU s/p YAG cap OD Doing well s/p YAG cap right eye.        Left posterior capsular opacification YAG capsulotomy  left eye performed today without complication.       Esotropia chronic c prism      Dry eyes, bilateral        Plan:  You may notice more floaters for the next few days.  Return for follow-up intraocular pressure check and refraction in a couple of weeks as scheduled.    Continue artificial tears up to four times a day as needed     Porfirio Rodriguez MD  [phone]          Again, thank you for allowing me to participate in the care of your patient.        Sincerely,        Porfirio Rodriguez MD

## 2023-02-07 NOTE — PATIENT INSTRUCTIONS
You may notice more floaters for the next few days.  Return for follow-up intraocular pressure check and refraction in a couple of weeks as scheduled.    Continue artificial tears up to four times a day as needed     Porfirio Rodriguez MD  [phone]

## 2023-02-07 NOTE — PROGRESS NOTES
Current Eye Medications:  Systane - both eyes BID     Subjective:  Here for YAG Capsulotomy Left Eye today. Left eye vision is blurry. Desires YAG Cap left eye - consent signed.     S/p YAG Cap Right Eye (1/31/23) - notices right eye vision is better.     Objective:  See Ophthalmology Exam.      Assessment:  Ingrid Culver is a 89 year old female who presents with:   Encounter Diagnoses   Name Primary?     Pseudophakia OU s/p YAG cap OD Doing well s/p YAG cap right eye.        Left posterior capsular opacification YAG capsulotomy  left eye performed today without complication.       Esotropia chronic c prism      Dry eyes, bilateral        Plan:  You may notice more floaters for the next few days.  Return for follow-up intraocular pressure check and refraction in a couple of weeks as scheduled.    Continue artificial tears up to four times a day as needed     Porfirio Rodriguez MD  [phone]

## 2023-02-17 ENCOUNTER — ANCILLARY PROCEDURE (OUTPATIENT)
Dept: GENERAL RADIOLOGY | Facility: CLINIC | Age: 88
End: 2023-02-17
Attending: INTERNAL MEDICINE
Payer: COMMERCIAL

## 2023-02-17 ENCOUNTER — OFFICE VISIT (OUTPATIENT)
Dept: INTERNAL MEDICINE | Facility: CLINIC | Age: 88
End: 2023-02-17
Payer: COMMERCIAL

## 2023-02-17 VITALS
BODY MASS INDEX: 22.97 KG/M2 | OXYGEN SATURATION: 97 % | HEIGHT: 60 IN | DIASTOLIC BLOOD PRESSURE: 75 MMHG | WEIGHT: 117 LBS | TEMPERATURE: 97.9 F | HEART RATE: 68 BPM | SYSTOLIC BLOOD PRESSURE: 128 MMHG

## 2023-02-17 DIAGNOSIS — M54.50 CHRONIC RIGHT-SIDED LOW BACK PAIN WITHOUT SCIATICA: ICD-10-CM

## 2023-02-17 DIAGNOSIS — Z87.01 HISTORY OF INFLUENZA PNEUMONIA: ICD-10-CM

## 2023-02-17 DIAGNOSIS — Z87.01 HISTORY OF PNEUMONIA: ICD-10-CM

## 2023-02-17 DIAGNOSIS — N18.31 STAGE 3A CHRONIC KIDNEY DISEASE (H): ICD-10-CM

## 2023-02-17 DIAGNOSIS — Z00.01 ENCOUNTER FOR GENERAL ADULT MEDICAL EXAMINATION WITH ABNORMAL FINDINGS: Primary | ICD-10-CM

## 2023-02-17 DIAGNOSIS — I10 HYPERTENSION GOAL BP (BLOOD PRESSURE) < 150/90: ICD-10-CM

## 2023-02-17 DIAGNOSIS — E78.5 HYPERLIPIDEMIA LDL GOAL <130: ICD-10-CM

## 2023-02-17 DIAGNOSIS — I73.9 PERIPHERAL ARTERIAL DISEASE (H): ICD-10-CM

## 2023-02-17 DIAGNOSIS — E03.4 HYPOTHYROIDISM DUE TO ACQUIRED ATROPHY OF THYROID: ICD-10-CM

## 2023-02-17 DIAGNOSIS — J47.9 BRONCHIECTASIS WITHOUT COMPLICATION (H): ICD-10-CM

## 2023-02-17 DIAGNOSIS — G89.29 CHRONIC RIGHT-SIDED LOW BACK PAIN WITHOUT SCIATICA: ICD-10-CM

## 2023-02-17 DIAGNOSIS — I25.119 CORONARY ARTERY DISEASE INVOLVING NATIVE CORONARY ARTERY OF NATIVE HEART WITH ANGINA PECTORIS (H): ICD-10-CM

## 2023-02-17 DIAGNOSIS — Z86.2 HISTORY OF ANEMIA: ICD-10-CM

## 2023-02-17 DIAGNOSIS — M81.0 AGE-RELATED OSTEOPOROSIS WITHOUT CURRENT PATHOLOGICAL FRACTURE: ICD-10-CM

## 2023-02-17 DIAGNOSIS — M41.25 OTHER IDIOPATHIC SCOLIOSIS, THORACOLUMBAR REGION: ICD-10-CM

## 2023-02-17 DIAGNOSIS — J43.9 PULMONARY EMPHYSEMA, UNSPECIFIED EMPHYSEMA TYPE (H): ICD-10-CM

## 2023-02-17 LAB
ANION GAP SERPL CALCULATED.3IONS-SCNC: 3 MMOL/L (ref 3–14)
BUN SERPL-MCNC: 21 MG/DL (ref 7–30)
CALCIUM SERPL-MCNC: 8.8 MG/DL (ref 8.5–10.1)
CHLORIDE BLD-SCNC: 107 MMOL/L (ref 94–109)
CHOLEST SERPL-MCNC: 136 MG/DL
CO2 SERPL-SCNC: 31 MMOL/L (ref 20–32)
CREAT SERPL-MCNC: 0.74 MG/DL (ref 0.52–1.04)
ERYTHROCYTE [DISTWIDTH] IN BLOOD BY AUTOMATED COUNT: 14.2 % (ref 10–15)
FASTING STATUS PATIENT QL REPORTED: YES
GFR SERPL CREATININE-BSD FRML MDRD: 76 ML/MIN/1.73M2
GLUCOSE BLD-MCNC: 88 MG/DL (ref 70–99)
HCT VFR BLD AUTO: 37.1 % (ref 35–47)
HDLC SERPL-MCNC: 55 MG/DL
HGB BLD-MCNC: 11.8 G/DL (ref 11.7–15.7)
LDLC SERPL CALC-MCNC: 56 MG/DL
MCH RBC QN AUTO: 30.3 PG (ref 26.5–33)
MCHC RBC AUTO-ENTMCNC: 31.8 G/DL (ref 31.5–36.5)
MCV RBC AUTO: 95 FL (ref 78–100)
NONHDLC SERPL-MCNC: 81 MG/DL
PLATELET # BLD AUTO: 230 10E3/UL (ref 150–450)
POTASSIUM BLD-SCNC: 4.9 MMOL/L (ref 3.4–5.3)
RBC # BLD AUTO: 3.9 10E6/UL (ref 3.8–5.2)
SODIUM SERPL-SCNC: 141 MMOL/L (ref 133–144)
TRIGL SERPL-MCNC: 125 MG/DL
TSH SERPL DL<=0.005 MIU/L-ACNC: 1.95 MU/L (ref 0.4–4)
WBC # BLD AUTO: 7.1 10E3/UL (ref 4–11)

## 2023-02-17 PROCEDURE — 36415 COLL VENOUS BLD VENIPUNCTURE: CPT | Performed by: INTERNAL MEDICINE

## 2023-02-17 PROCEDURE — 80048 BASIC METABOLIC PNL TOTAL CA: CPT | Performed by: INTERNAL MEDICINE

## 2023-02-17 PROCEDURE — 85027 COMPLETE CBC AUTOMATED: CPT | Performed by: INTERNAL MEDICINE

## 2023-02-17 PROCEDURE — 72100 X-RAY EXAM L-S SPINE 2/3 VWS: CPT | Mod: TC | Performed by: RADIOLOGY

## 2023-02-17 PROCEDURE — 80061 LIPID PANEL: CPT | Performed by: INTERNAL MEDICINE

## 2023-02-17 PROCEDURE — 99397 PER PM REEVAL EST PAT 65+ YR: CPT | Performed by: INTERNAL MEDICINE

## 2023-02-17 PROCEDURE — 84443 ASSAY THYROID STIM HORMONE: CPT | Performed by: INTERNAL MEDICINE

## 2023-02-17 PROCEDURE — 99214 OFFICE O/P EST MOD 30 MIN: CPT | Mod: 25 | Performed by: INTERNAL MEDICINE

## 2023-02-17 PROCEDURE — 71046 X-RAY EXAM CHEST 2 VIEWS: CPT | Mod: TC | Performed by: RADIOLOGY

## 2023-02-17 PROCEDURE — 72070 X-RAY EXAM THORAC SPINE 2VWS: CPT | Mod: TC | Performed by: RADIOLOGY

## 2023-02-17 ASSESSMENT — PATIENT HEALTH QUESTIONNAIRE - PHQ9
SUM OF ALL RESPONSES TO PHQ QUESTIONS 1-9: 17
10. IF YOU CHECKED OFF ANY PROBLEMS, HOW DIFFICULT HAVE THESE PROBLEMS MADE IT FOR YOU TO DO YOUR WORK, TAKE CARE OF THINGS AT HOME, OR GET ALONG WITH OTHER PEOPLE: SOMEWHAT DIFFICULT
SUM OF ALL RESPONSES TO PHQ QUESTIONS 1-9: 17

## 2023-02-17 ASSESSMENT — ENCOUNTER SYMPTOMS
CONSTIPATION: 1
DYSURIA: 0
ABDOMINAL PAIN: 0
DIARRHEA: 0
PARESTHESIAS: 0
NERVOUS/ANXIOUS: 1
SHORTNESS OF BREATH: 1
HEMATURIA: 0
FEVER: 0
NAUSEA: 1
HEARTBURN: 0
ARTHRALGIAS: 1
CHILLS: 0
BREAST MASS: 0
DIZZINESS: 1
HEMATOCHEZIA: 0
JOINT SWELLING: 0
HEADACHES: 0
WEAKNESS: 1
FREQUENCY: 1
COUGH: 1
SORE THROAT: 0
PALPITATIONS: 0
MYALGIAS: 1
EYE PAIN: 0

## 2023-02-17 ASSESSMENT — ACTIVITIES OF DAILY LIVING (ADL)
CURRENT_FUNCTION: SHOPPING REQUIRES ASSISTANCE
CURRENT_FUNCTION: LAUNDRY REQUIRES ASSISTANCE
CURRENT_FUNCTION: HOUSEWORK REQUIRES ASSISTANCE
CURRENT_FUNCTION: TRANSPORTATION REQUIRES ASSISTANCE

## 2023-02-17 NOTE — LETTER
February 20, 2023    Ingrid A Palomo  [address] [facility] [address]          Dear ,    We are writing to inform you of your test results.    Here is your chest xray report.  I did look at them, I feel there is some chronic scarring/lung collapse at the Left lower base for years.  In part this is due to the chest deformity (caused by scoliosis).  This is why I want you to do lung exercised three times per day:  expand them (as discussed)      Resulted Orders   XR Chest 2 Views    Narrative    CHEST 2 VIEWS   2/17/2023 12:56 PM     HISTORY: Bronchiectasis without complication (H); History of  pneumonia.    COMPARISON: 7/14/2021.      Impression    IMPRESSION: No acute airspace disease identified. Stable ill-defined  patchy nodular and linear opacities at the left mid to low chest that  may be stable configuration of scarring. Stable cardiac silhouette.    HYUN RIVERO MD         SYSTEM ID:  [id number]     If you have any questions or concerns, please call the clinic at the number listed above.     Sincerely,    Radha Sheets MD

## 2023-02-17 NOTE — LETTER
February 20, 2023    Ingrid Culver  [address] [facility] [address]          Dear ,    We are writing to inform you of your test results.  Cholesterol, thyroid, electrolytes, kidney function and blood count all look GREAT.      No medication changes are needed:   continue current management      It was nice to see you Ingrid!!         Resulted Orders   CBC with platelets   Result Value Ref Range    WBC Count 7.1 4.0 - 11.0 10e3/uL    RBC Count 3.90 3.80 - 5.20 10e6/uL    Hemoglobin 11.8 11.7 - 15.7 g/dL    Hematocrit 37.1 35.0 - 47.0 %    MCV 95 78 - 100 fL    MCH 30.3 26.5 - 33.0 pg    MCHC 31.8 31.5 - 36.5 g/dL    RDW 14.2 10.0 - 15.0 %    Platelet Count 230 150 - 450 10e3/uL   Lipid panel reflex to direct LDL Fasting   Result Value Ref Range    Cholesterol 136 <200 mg/dL    Triglycerides 125 <150 mg/dL    Direct Measure HDL 55 >=50 mg/dL    LDL Cholesterol Calculated 56 <=100 mg/dL    Non HDL Cholesterol 81 <130 mg/dL    Patient Fasting > 8hrs? Yes     Narrative    Cholesterol  Desirable:  <200 mg/dL    Triglycerides  Normal:  Less than 150 mg/dL  Borderline High:  150-199 mg/dL  High:  200-499 mg/dL  Very High:  Greater than or equal to 500 mg/dL    Direct Measure HDL  Female:  Greater than or equal to 50 mg/dL   Male:  Greater than or equal to 40 mg/dL    LDL Cholesterol  Desirable:  <100mg/dL  Above Desirable:  100-129 mg/dL   Borderline High:  130-159 mg/dL   High:  160-189 mg/dL   Very High:  >= 190 mg/dL    Non HDL Cholesterol  Desirable:  130 mg/dL  Above Desirable:  130-159 mg/dL  Borderline High:  160-189 mg/dL  High:  190-219 mg/dL  Very High:  Greater than or equal to 220 mg/dL   TSH with free T4 reflex   Result Value Ref Range    TSH 1.95 0.40 - 4.00 mU/L   Basic metabolic panel   Result Value Ref Range    Sodium 141 133 - 144 mmol/L    Potassium 4.9 3.4 - 5.3 mmol/L    Chloride 107 94 - 109 mmol/L    Carbon Dioxide (CO2) 31 20 - 32 mmol/L    Anion Gap 3 3 - 14 mmol/L     Urea Nitrogen 21 7 - 30 mg/dL    Creatinine 0.74 0.52 - 1.04 mg/dL    Calcium 8.8 8.5 - 10.1 mg/dL    Glucose 88 70 - 99 mg/dL    GFR Estimate 76 >60 mL/min/1.73m2      Comment:      eGFR calculated using 2021 CKD-EPI equation.       If you have any questions or concerns, please call the clinic at the number listed above.       Sincerely,      Radha Sheets MD

## 2023-02-17 NOTE — PROGRESS NOTES
"SUBJECTIVE:   Ingrid is a 89 year old who presents for Preventive Visit.  Patient has been advised of split billing requirements and indicates understanding: Yes  Are you in the first 12 months of your Medicare coverage?  No    89 y/o F here for AFE.  H/o  Bronchiectasis (azithromycin 3X/wk, duonebs bid and VEST), HTN, GERD, Hypothyroid, CAD (RCA Stent 12/2017, follows with [facility]), SHAHZAD (mild-mod, no longer imaging).   Sees pulmonologist & cardiologist yearly.     > Recovered from Influenza pneumonia last month  > Mild anemia    Has back pain: got  T12-L1 interlaminar epidural steroid injection last year.   Helped a little bit, but did not go back... she wishes she had gone back as recommended.     Right now, her back pain is worse.  Hard to even stand long enough to get a meal done.     She would like to consider another shot.     SOB:  In part, her chronic back pain ties into this.       Healthy Habits:     In general, how would you rate your overall health?  Fair    Frequency of exercise:  2-3 days/week    Duration of exercise:  N/A    Do you usually eat at least 4 servings of fruit and vegetables a day, include whole grains    & fiber and avoid regularly eating high fat or \"junk\" foods?  No    Taking medications regularly:  Yes    Medication side effects:  None    Ability to successfully perform activities of daily living:  Transportation requires assistance, shopping requires assistance, housework requires assistance and laundry requires assistance    Home Safety:  No safety concerns identified    Hearing Impairment:  Need to ask people to speak up or repeat themselves    In the past 6 months, have you been bothered by leaking of urine? Yes    In general, how would you rate your overall mental or emotional health?  Fair      PHQ-2 Total Score: 5    Additional concerns today:  No      Have you ever done Advance Care Planning? (For example, a Health Directive, POLST, or a discussion with a medical provider or your " loved ones about your wishes): Yes, advance care planning is on file.       Fall risk  Fallen 2 or more times in the past year?: Yes  Any fall with injury in the past year?: No    Cognitive Screening   1) Repeat 3 items (Leader, Season, Table)    2) Clock draw: NORMAL  3) 3 item recall: Recalls 2 objects   Results: NORMAL clock, 1-2 items recalled: COGNITIVE IMPAIRMENT LESS LIKELY    Mini-CogTM Copyright MACO Galloway. Licensed by the author for use in [facility]; reprinted with permission (sena@[facility]). All rights reserved.      Do you have sleep apnea, excessive snoring or daytime drowsiness?: daytime drowsiness    Reviewed and updated as needed this visit by clinical staff                  Reviewed and updated as needed this visit by Provider                 Social History     Tobacco Use     Smoking status: Former     Packs/day: 0.00     Years: 0.00     Pack years: 0.00     Types: Cigarettes     Quit date: 1974     Years since quittin.3     Smokeless tobacco: Never   Substance Use Topics     Alcohol use: Yes     Comment: On special occassion     If you drink alcohol do you typically have >3 drinks per day or >7 drinks per week? No    Alcohol Use 2023   Prescreen: >3 drinks/day or >7 drinks/week? No   Prescreen: >3 drinks/day or >7 drinks/week? -           Back pain, SOB     Current providers sharing in care for this patient include:   Patient Care Team:  Radha Sheets MD as PCP - Porfirio Cordoba MD as Assigned Surgical Provider  Radha Sheets MD as Assigned PCP  Yunior Quiroga MD as Assigned Musculoskeletal Provider    The following health maintenance items are reviewed in Epic and correct as of today:  Health Maintenance   Topic Date Due     ZOSTER IMMUNIZATION (3 of 3) 10/16/2020     COVID-19 Vaccine (5 - Booster for Pfizer series) 2022     ANNUAL REVIEW OF HM ORDERS  2022     MEDICARE ANNUAL WELLNESS VISIT  2022     BMP  2022      LIPID  11/19/2022     TSH W/FREE T4 REFLEX  11/19/2022     DEXA  10/02/2023     EYE EXAM  01/26/2024     FALL RISK ASSESSMENT  02/17/2024     ADVANCE CARE PLANNING  11/19/2026     DTAP/TDAP/TD IMMUNIZATION (5 - Td or Tdap) 04/17/2028     SPIROMETRY  Completed     COPD ACTION PLAN  Completed     PHQ-2 (once per calendar year)  Completed     INFLUENZA VACCINE  Completed     Pneumococcal Vaccine: 65+ Years  Completed     IPV IMMUNIZATION  Aged Out     MENINGITIS IMMUNIZATION  Aged Out     MAMMO SCREENING  Discontinued     Lab work is in process  Labs reviewed in EPIC  BP Readings from Last 3 Encounters:   02/17/23 128/75   01/12/23 132/84   11/28/22 126/72    Wt Readings from Last 3 Encounters:   02/17/23 53.1 kg (117 lb)   01/12/23 54 kg (119 lb)   11/28/22 54 kg (119 lb)                  Patient Active Problem List   Diagnosis     SHOULDER IMPINGEMENT - left     Hyperlipidemia LDL goal <130     Insomnia     Trigger finger, acquired - right ring     Trigger thumb - right     Bronchiectasis (H)     Cerebral infarction (H)     Vitreous syneresis     PVD (posterior vitreous detachment)     Pseudophakia OU     Esotropia chronic c prism     Dry eyes, bilateral     Other idiopathic scoliosis, thoracolumbar region     Ganglion cyst of finger of right hand     Hypertension goal BP (blood pressure) < 150/90     Hypothyroidism due to acquired atrophy of thyroid     Coronary artery disease involving native coronary artery of native heart, angina presence unspecified     Peripheral arterial disease (H)     COPD (chronic obstructive pulmonary disease) (H)     Age-related osteoporosis without current pathological fracture     Impingement syndrome of right shoulder     Past Surgical History:   Procedure Laterality Date     APPENDECTOMY       BREAST BIOPSY, RT/LT      Breat Biopsy RT/LT     CARDIAC SURGERY  12/03/2017    [facility]     CATARACT IOL, RT/LT       COLONOSCOPY  06/00   And 2008    diverticulosis     COLONOSCOPY   2012     ENDOSCOPY  2012     EXTRACAPSULAR CATARACT EXTRATION WITH INTRAOCULAR LENS IMPLANT  2000    both eyes     FLEXIBLE SIGMOIDOSCOPY  ,     JOINT REPLACEMTN, KNEE RT/LT      right     YAG CAPSULOTOMY OD (RIGHT EYE) Right 2023    Dr. Rodriguez     YAG CAPSULOTOMY OS (LEFT EYE) Left 2023    Dr. Rodriguez     ZZC TOTAL KNEE ARTHROPLASTY  2003    left poly exchange       Social History     Tobacco Use     Smoking status: Former     Packs/day: 0.00     Years: 0.00     Pack years: 0.00     Types: Cigarettes     Quit date: 1974     Years since quittin.3     Smokeless tobacco: Never   Substance Use Topics     Alcohol use: Yes     Comment: On special occassion     Family History   Problem Relation Age of Onset     Breast Cancer Mother      Arthritis Mother      Thyroid Disease Mother      Cardiovascular Father      Asthma Maternal Grandmother      Cancer Brother      Genetic Disorder Brother      Other Cancer Brother         Esophageal     Cancer Son         tonsil and tongue, bladder     Alcohol/Drug Son      Other Cancer Son      Heart Disease Daughter      Asthma Daughter      Genetic Disorder Brother      Other Cancer Brother      Glaucoma No family hx of      Macular Degeneration No family hx of          Current Outpatient Medications   Medication Sig Dispense Refill     Acetaminophen (TYLENOL PO) Take 500 mg by mouth 2 times daily       aspirin 81 MG tablet Take by mouth daily       azithromycin (ZITHROMAX) 250 MG tablet 1 tablet every ,,       Docusate Sodium (COLACE PO) Take by mouth 2 times daily       ipratropium - albuterol 0.5 mg/2.5 mg/3 mL (DUONEB) 0.5-2.5 (3) MG/3ML neb solution Take 1 vial by nebulization every 6 hours as needed for shortness of breath / dyspnea or wheezing       isosorbide dinitrate (ISORDIL) 30 MG tablet Take 30 mg by mouth daily       LANsoprazole (PREVACID) 15 MG DR capsule Take 15 mg by mouth       levothyroxine  (SYNTHROID/LEVOTHROID) 25 MCG tablet Take 1 tablet (25 mcg) by mouth daily 90 tablet 3     metoprolol tartrate (LOPRESSOR) 50 MG tablet Take 0.5 tablets (25 mg) by mouth 2 times daily Take one tablet (50 mg) each morning, and take 1/2 tablet (25 mg) at night (Patient taking differently: Take 25 mg by mouth 2 times daily 1/2 a pill BID) 90 tablet 3     Nitroglycerin (NITROTAB SL) Place 0.4 mg under the tongue       polyethylene glycol (MIRALAX/GLYCOLAX) Packet Take 1 packet by mouth daily       psyllium (METAMUCIL) WAFR Take 2 Wafers by mouth daily       rosuvastatin (CRESTOR) 10 MG tablet Take 1 tablet (10 mg) by mouth daily 90 tablet 3     Allergies   Allergen Reactions     Zocor [Hmg-Coa-R Inhibitors]      Recent Labs   Lab Test 11/19/21  0836 08/21/20  0915 05/31/19  1305 12/02/17  0000 06/22/17  0753 04/11/16  0000 12/16/15  1051 12/11/14  0945   LDL 74 69 75   < >  --    < > 91 70   HDL 56 56 61   < >  --    < > 63 55   TRIG 112 128 103   < >  --    < > 156* 138   ALT  --   --   --   --  22  --  32 21   CR 0.93 0.93 0.88   < >  --    < > 0.83 0.78   GFRESTIMATED 55* 55* 59*   < >  --    < > 65 70   GFRESTBLACK  --  64 69   < >  --    < > 79 85   POTASSIUM 4.9 4.5 4.8   < >  --    < > 4.9 4.0   TSH 3.02 2.75 1.79   < >  --    < > 2.04 1.63    < > = values in this interval not displayed.           Mammogram Screening - Patient over age 75, has elected to discontinue screenings.  Pertinent mammograms are reviewed under the imaging tab.    Review of Systems   Constitutional: Negative for chills and fever.   HENT: Positive for congestion and hearing loss. Negative for ear pain and sore throat.    Eyes: Positive for visual disturbance. Negative for pain.   Respiratory: Positive for cough and shortness of breath.    Cardiovascular: Negative for chest pain, palpitations and peripheral edema.   Gastrointestinal: Positive for constipation and nausea. Negative for abdominal pain, diarrhea, heartburn and hematochezia.  "  Breasts:  Negative for tenderness, breast mass and discharge.   Genitourinary: Positive for frequency and urgency. Negative for dysuria, genital sores, hematuria, pelvic pain, vaginal bleeding and vaginal discharge.   Musculoskeletal: Positive for arthralgias and myalgias. Negative for joint swelling.   Skin: Negative for rash.   Neurological: Positive for dizziness and weakness. Negative for headaches and paresthesias.   Psychiatric/Behavioral: Positive for mood changes. The patient is nervous/anxious.           OBJECTIVE:   /75 (BP Location: Right arm, Patient Position: Sitting, Cuff Size: Adult Regular)   Pulse 68   Temp 97.9  F (36.6  C) (Oral)   Ht 1.511 m (4' 11.5\")   Wt 53.1 kg (117 lb)   SpO2 97%   BMI 23.24 kg/m   Estimated body mass index is 23.46 kg/m  as calculated from the following:    Height as of 1/12/23: 1.517 m (4' 11.72\").    Weight as of 1/12/23: 54 kg (119 lb).  Physical Exam  GENERAL APPEARANCE: healthy, alert and no distress  EYES: Eyes grossly normal to inspection, PERRL and conjunctivae and sclerae normal  HENT: ear canals and TM's normal, nose and mouth without ulcers or lesions, oropharynx clear and oral mucous membranes moist  NECK: no adenopathy, no asymmetry, masses, or scars and thyroid normal to palpation  RESP: lungs clear to auscultation - no rales, rhonchi or wheezes  RESP: coarse sounds at Left lower lobe, improved during subsequent resp cycles.   CV: regular rate and rhythm, normal S1 S2, no S3 or S4, no murmur, click or rub, no peripheral edema and peripheral pulses strong  ABDOMEN: soft, nontender, no hepatosplenomegaly, no masses and bowel sounds normal  MS: no musculoskeletal defects are noted and gait is age appropriate without ataxia  SKIN: no suspicious lesions or rashes  NEURO: Normal strength and tone, sensory exam grossly normal, mentation intact and speech normal  PSYCH: mentation appears normal and affect normal/bright    Diagnostic Test Results:  Labs " reviewed in Epic  Results for orders placed or performed in visit on 02/17/23 (from the past 24 hour(s))   CBC with platelets   Result Value Ref Range    WBC Count 7.1 4.0 - 11.0 10e3/uL    RBC Count 3.90 3.80 - 5.20 10e6/uL    Hemoglobin 11.8 11.7 - 15.7 g/dL    Hematocrit 37.1 35.0 - 47.0 %    MCV 95 78 - 100 fL    MCH 30.3 26.5 - 33.0 pg    MCHC 31.8 31.5 - 36.5 g/dL    RDW 14.2 10.0 - 15.0 %    Platelet Count 230 150 - 450 10e3/uL     cxr shows scoliosis and chronic LLL infiltrate/atelectasis     ASSESSMENT / PLAN:      ASSESSMENT / PLAN:  (Z00.01) Encounter for general adult medical examination with abnormal findings  (primary encounter diagnosis)  Comment: well supported 89 y/o    Plan:      (Z87.01) History of influenza pneumonia  Comment:  CXR repeated  Plan: OFFICE/OUTPT VISIT,EST,LEVL IV             (I10) Hypertension goal BP (blood pressure) < 150/90  Comment: continue current management   Plan: Basic metabolic panel, OFFICE/OUTPT         VISIT,EST,LEVL IV             (J43.9) Pulmonary emphysema, unspecified emphysema type (H)  Comment:    Plan:      (Z87.01) History of pneumonia  Comment:  Her cxr continues to show chronic LLL infiltrate/atelectases.  I asked her to perform pulmonary toilet daily.  She will f/u with pulmonary in April   Plan: XR Chest 2 Views, OFFICE/OUTPT VISIT,EST,LEVL         IV             (J47.9) Bronchiectasis without complication (H)  Comment:  pulm toilet discussed.  Flutter valve, start using it again.   She is on abx three days per week as preventative per pulmonary   Plan: XR Chest 2 Views, OFFICE/OUTPT VISIT,EST,LEVL         IV             (M41.25) Other idiopathic scoliosis, thoracolumbar region  Comment:  Contributes to lung restriction/abnormalities.  Also Chronic R sided LBP   Plan: Pain Management  Referral         Re evaluate for possible injection if appropriate.       (M54.50,  G89.29) Chronic right-sided low back pain without sciatica  Comment:  Re evaluate  for injection / intervention    Will get  film for r/o compression fx.    Plan: XR Thoracic Spine 2 Views, XR Lumbar Spine 2/3         Views, OFFICE/OUTPT VISIT,EST,LEVL IV             (M81.0) Age-related osteoporosis without current pathological fracture  Comment: will get  film to r/o compression fx.   Plan:      (E03.4) Hypothyroidism due to acquired atrophy of thyroid  Comment:  Due to recheck   Plan: TSH with free T4 reflex             (E78.5) Hyperlipidemia LDL goal <130  Comment:  Recheck   Plan: Lipid panel reflex to direct LDL Fasting             (N18.31) Stage 3a chronic kidney disease (H)  Comment:  Stable   Plan: OFFICE/OUTPT VISIT,EST,LEVL IV             (I73.9) Peripheral arterial disease (H)  Comment:  No symptoms   Plan:      (I25.119) Coronary artery disease involving native coronary artery of native heart with angina pectoris (H)  Comment:  No symptoms     Plan:      (Z86.2) History of anemia  Comment:  Normal Hgb this year.    Plan: CBC with platelets                COUNSELING:  Reviewed preventive health counseling, as reflected in patient instructions       Regular exercise       Healthy diet/nutrition        She reports that she quit smoking about 48 years ago. Her smoking use included cigarettes. She has never used smokeless tobacco.      Appropriate preventive services were discussed with this patient, including applicable screening as appropriate for cardiovascular disease, diabetes, osteopenia/osteoporosis, and glaucoma.  As appropriate for age/gender, discussed screening for colorectal cancer, prostate cancer, breast cancer, and cervical cancer. Checklist reviewing preventive services available has been given to the patient.    Reviewed patients plan of care and provided an AVS. The Basic Care Plan (routine screening as documented in Health Maintenance) for Ingrid meets the Care Plan requirement. This Care Plan has been established and reviewed with the Patient.          Radha  HAY Sheets MD  [facility]    Identified Health Risks:  Answers for HPI/ROS submitted by the patient on 2/17/2023  If you checked off any problems, how difficult have these problems made it for you to do your work, take care of things at home, or get along with other people?: Somewhat difficult  PHQ9 TOTAL SCORE: 17

## 2023-02-17 NOTE — RESULT ENCOUNTER NOTE
Ingrid Culver    Here is your chest xray report.  I did look at them, I feel there is some chronic scarring/lung collapse at the Left lower base for years.  In part this is due to the chest deformity (caused by scoliosis).  This is why I want you to do lung exercised three times per day:  expand them (as discussed)    Sincerely,       JOSE ANGEL MUSA M.D.

## 2023-02-17 NOTE — PATIENT INSTRUCTIONS
Consider Certification (tel visit with Dr Huitron)      Pulmonary exercises three times per day (flutter and loud singing)      Pain team will call    a  will call you to coordinate .... If you don t hear from a representative within 2 business days, please call [phone].

## 2023-02-20 NOTE — RESULT ENCOUNTER NOTE
Ingrid Mcdermottative joint arthritis changes are severe and evident.  The scoliosis is present as expected.    No unexpected developments such as compression fractures of vertebrate.     Sincerely,       JOSE ANGEL MUSA M.D.

## 2023-02-20 NOTE — RESULT ENCOUNTER NOTE
Ingrid Culver    Cholesterol, thyroid, electrolytes, kidney function and blood count all look GREAT.     No medication changes are needed:   continue current management     It was nice to see you Ingrid!!       Best,       JOSE ANGEL MUSA M.D.

## 2023-02-28 ENCOUNTER — TRANSFERRED RECORDS (OUTPATIENT)
Dept: MULTI SPECIALTY CLINIC | Facility: CLINIC | Age: 88
End: 2023-02-28

## 2023-02-28 ENCOUNTER — OFFICE VISIT (OUTPATIENT)
Dept: OPHTHALMOLOGY | Facility: CLINIC | Age: 88
End: 2023-02-28
Payer: COMMERCIAL

## 2023-02-28 DIAGNOSIS — Z96.1 PSEUDOPHAKIA: Primary | ICD-10-CM

## 2023-02-28 DIAGNOSIS — H04.123 DRY EYES, BILATERAL: ICD-10-CM

## 2023-02-28 DIAGNOSIS — H50.00 ESOTROPIA: ICD-10-CM

## 2023-02-28 LAB — RETINOPATHY: NORMAL

## 2023-02-28 PROCEDURE — 99024 POSTOP FOLLOW-UP VISIT: CPT | Performed by: STUDENT IN AN ORGANIZED HEALTH CARE EDUCATION/TRAINING PROGRAM

## 2023-02-28 ASSESSMENT — SLIT LAMP EXAM - LIDS
COMMENTS: LEVATOR DEHISCENSE
COMMENTS: LEVATOR DEHISCENSE

## 2023-02-28 ASSESSMENT — VISUAL ACUITY
OS_CC: 20/30
OD_PH_CC: 20/40
METHOD: SNELLEN - LINEAR
OD_CC: 20/50
CORRECTION_TYPE: GLASSES
OD_CC+: -2

## 2023-02-28 ASSESSMENT — REFRACTION_WEARINGRX
OD_SPHERE: -1.50
OD_ADD: +3.00
OD_HBASE: OUT
OD_AXIS: 155
OD_CYLINDER: +0.50
OD_HPRISM: 9.5
OS_AXIS: 180
OS_HBASE: OUT
OD_VPRISM: 2.5
OS_HPRISM: 9.5
OS_CYLINDER: +1.25
OS_ADD: +3.00
OS_SPHERE: -1.00
OD_VBASE: UP

## 2023-02-28 ASSESSMENT — REFRACTION_MANIFEST
OS_SPHERE: -1.50
OS_CYLINDER: +1.75
OD_AXIS: 150
OD_ADD: +3.00
OS_AXIS: 180
OD_SPHERE: -2.00
OS_ADD: +3.00
OD_CYLINDER: +0.75

## 2023-02-28 ASSESSMENT — TONOMETRY
OD_IOP_MMHG: 20
OS_IOP_MMHG: 19
IOP_METHOD: TONOPEN

## 2023-02-28 ASSESSMENT — EXTERNAL EXAM - RIGHT EYE: OD_EXAM: NORMAL

## 2023-02-28 ASSESSMENT — REFRACTION_FINALRX
OS_HBASE: OUT
OS_HPRISM: 11.5
OD_HBASE: OUT
OD_VPRISM: 2.5
OD_HPRISM: 11.5

## 2023-02-28 ASSESSMENT — EXTERNAL EXAM - LEFT EYE: OS_EXAM: NORMAL

## 2023-02-28 NOTE — PROGRESS NOTES
Current Eye Medications:  None in last few days, was using systane.     Subjective:  S/P Yag Cap left eye 02/7/2023. Vision is more blurry both eyes, since last seen. Used to be able to see little print on TV, but not anymore. Double vision is almost worse then it was before, had for a long time. No eye pain or discomfort in either eye.      Objective:  See Ophthalmology Exam.       Assessment:  Ingrid Culver is a 89 year old female who presents with:   Encounter Diagnoses   Name Primary?     Pseudophakia OU s/p YAG cap OU Well healed after YAG capsulotomies both eyes. Recommend updating glasses and using artificial tear use as below (recommended trying another brand if she was unhappy with Systane).     Esotropia chronic c prism      Dry eyes, bilateral        Plan:  Glasses prescription given    Resume artificial tear use - use 3-4 times per day for the next week, then can decrease to twice a day in both eyes     Porfirio Rodriguez MD  [phone]

## 2023-02-28 NOTE — PATIENT INSTRUCTIONS
Glasses prescription given    Resume artificial tear use - use 3-4 times per day for the next week, then can decrease to twice a day in both eyes     Porfirio Rodriguez MD  [phone]

## 2023-02-28 NOTE — LETTER
2/28/2023         RE: Ingrid Culver  [address]  [facility] [address]        Dear Colleague,    Thank you for referring your patient, Ingrid Culver, to the [facility]. Please see a copy of my visit note below.     Current Eye Medications:  None in last few days, was using systane.     Subjective:  S/P Yag Cap left eye 02/7/2023. Vision is more blurry both eyes, since last seen. Used to be able to see little print on TV, but not anymore. Double vision is almost worse then it was before, had for a long time. No eye pain or discomfort in either eye.      Objective:  See Ophthalmology Exam.       Assessment:  Ingrid Culver is a 89 year old female who presents with:   Encounter Diagnoses   Name Primary?     Pseudophakia OU s/p YAG cap OU Well healed after YAG capsulotomies both eyes. Recommend updating glasses and using artificial tear use as below (recommended trying another brand if she was unhappy with Systane).     Esotropia chronic c prism      Dry eyes, bilateral        Plan:  Glasses prescription given    Resume artificial tear use - use 3-4 times per day for the next week, then can decrease to twice a day in both eyes     Porfirio Rodriguez MD  [phone]          Again, thank you for allowing me to participate in the care of your patient.        Sincerely,        Porfirio Rodriguez MD

## 2023-03-06 ASSESSMENT — ENCOUNTER SYMPTOMS
COUGH: 1
LIGHT-HEADEDNESS: 1
POSTURAL DYSPNEA: 0
SLEEP DISTURBANCES DUE TO BREATHING: 0
DISTURBANCES IN COORDINATION: 0
SHORTNESS OF BREATH: 1
EXERCISE INTOLERANCE: 1
DIZZINESS: 1
SYNCOPE: 0
DYSPNEA ON EXERTION: 1
MUSCLE WEAKNESS: 1
SPEECH CHANGE: 0
WEAKNESS: 1
EYE IRRITATION: 1
SEIZURES: 0
DOUBLE VISION: 1
MYALGIAS: 1
JOINT SWELLING: 1
MEMORY LOSS: 0
LOSS OF CONSCIOUSNESS: 0
TINGLING: 0
DEPRESSION: 1
BACK PAIN: 1
EYE REDNESS: 0
WHEEZING: 0
HYPOTENSION: 1
ORTHOPNEA: 0
HYPERTENSION: 1
NERVOUS/ANXIOUS: 1
STIFFNESS: 1
HEMOPTYSIS: 0
COUGH DISTURBING SLEEP: 0
ARTHRALGIAS: 1
INSOMNIA: 0
PALPITATIONS: 0
DECREASED CONCENTRATION: 0
EYE PAIN: 0
NUMBNESS: 0
MUSCLE CRAMPS: 1
HEADACHES: 0
EYE WATERING: 0
PARALYSIS: 0
TREMORS: 0
SPUTUM PRODUCTION: 1
NECK PAIN: 0
LEG PAIN: 0
PANIC: 0
SNORES LOUDLY: 0

## 2023-03-08 ENCOUNTER — OFFICE VISIT (OUTPATIENT)
Dept: ANESTHESIOLOGY | Facility: CLINIC | Age: 88
End: 2023-03-08
Payer: COMMERCIAL

## 2023-03-08 ENCOUNTER — TELEPHONE (OUTPATIENT)
Dept: PALLIATIVE MEDICINE | Facility: CLINIC | Age: 88
End: 2023-03-08

## 2023-03-08 VITALS
OXYGEN SATURATION: 98 % | HEIGHT: 60 IN | SYSTOLIC BLOOD PRESSURE: 137 MMHG | HEART RATE: 64 BPM | BODY MASS INDEX: 22.97 KG/M2 | WEIGHT: 117 LBS | DIASTOLIC BLOOD PRESSURE: 77 MMHG

## 2023-03-08 DIAGNOSIS — M41.25 OTHER IDIOPATHIC SCOLIOSIS, THORACOLUMBAR REGION: ICD-10-CM

## 2023-03-08 PROCEDURE — 99204 OFFICE O/P NEW MOD 45 MIN: CPT | Mod: 24 | Performed by: ANESTHESIOLOGY

## 2023-03-08 ASSESSMENT — PAIN SCALES - GENERAL: PAINLEVEL: EXTREME PAIN (9)

## 2023-03-08 NOTE — LETTER
3/8/2023       RE: Ingrid Culver  [address]  [place] [place] [address]     Dear Colleague,    Thank you for referring your patient, Ingrid Culver, to the [facility] [place] at [facility]. Please see a copy of my visit note below.                          [facility] Consultation    Date of visit: 3/8/2023    Reason for consultation:    Ingrid Culver is a 89 year old female who is seen in consultation today at the request of her provider, Radha Sheets MD.    Primary Care Provider is Radha Sheets.  Pain medications are being prescribed by PCP.    Please see the [facility] [place] health questionnaire which the patient completed and reviewed with me in detail.    Chief Complaint:    Chief Complaint   Patient presents with     Consult     Consult Lower Back, Hips, Ribs pain       Pain history:  Ingrid Culver is a 89 year old female who first started having problems with pain in the back. She has gotten T12-L1 epidural injection 1 year ago, which helped somewhat, but did not return for the second injection d/t insurance issues. The pain radiates around the abdomen bilaterally. She is sitting around more so that the pain doesn't aggravete, thus the patient and the family member would like to take care of the pain for her to be more active again. She was hoping to see Dr. Jeff but the pain got worse over the past couple of month, so she decided to come to this clinic as she wanted to be seen sooner. The pain that she describes now is the same as was previously treated with the T12-L1 epidural steroid injection. However, she notes the pain has returned since the injection and has become worse, significantly limiting her function. She has thoracolumbare scoliosis that is causing her current symptoms. She has discussed potential surgery for correcting  this. However, she does not want to have surgery at this point.       Pain rating: intensity ranges from 5/10 to 10/10, and Averages 2/10 on a 0-10 scale.  Aggravating factors include: standing  Relieving factors include: resting  Any bowel or bladder incontinence: the ptn takes colace and miralax    Current treatments include:  Tylenol     Previous medication treatments included:  None     Other treatments have included:  Ingrid Culver has been seen at a pain clinic in the past.  Dr. Jeff, who did T12-L1 epidural injection  PT: N  Chiropractor: Y, not for the past 3 months  Acupuncture: N  TENs Unit: Y, used by chiropractor  Injections: Y, T12-L1 epidural injection    Past Medical History:  Past Medical History:   Diagnosis Date     Allergies     idiopathic, anaphyllactic rx's     Bronchiectasis     cough on inhalers recurrent infections     Cataract      Cerebral infarction (H)      COPD (chronic obstructive pulmonary disease) (H)      Disorders of lipoid metabolism     hx of on low fat diet     Esotropia chronic c prism 11/6/2013     Heart disease      Hypertension goal BP (blood pressure) < 150/90      Impingement syndrome of right shoulder 11/28/2022     Knee pain      Other bone/cartilage disease      Post-Nasal Drainage      Unspecified asthma(493.90)     Asthma     Unspecified hypothyroidism      Uterine fibroid     10/08 U/s shows 4X2.9X3/3     Patient Active Problem List    Diagnosis Date Noted     Stage 3a chronic kidney disease (H) 02/17/2023     Priority: Medium     Impingement syndrome of right shoulder 11/28/2022     Priority: Medium     Age-related osteoporosis without current pathological fracture 11/19/2021     Priority: Medium     COPD (chronic obstructive pulmonary disease) (H)      Priority: Medium     Peripheral arterial disease (H) 11/10/2020     Priority: Medium     Coronary artery disease involving native coronary artery of native heart with angina pectoris (H)      Priority: Medium      Replacing diagnoses that were inactivated after the 10/1/2021 regulatory import.       Hypothyroidism due to acquired atrophy of thyroid 01/17/2017     Priority: Medium     Hypertension goal BP (blood pressure) < 150/90 01/02/2017     Priority: Medium     Ganglion cyst of finger of right hand 08/16/2016     Priority: Medium     Other idiopathic scoliosis, thoracolumbar region 12/16/2015     Priority: Medium     Dry eyes, bilateral 11/10/2015     Priority: Medium     Vitreous syneresis 11/06/2013     Priority: Medium     PVD (posterior vitreous detachment) 11/06/2013     Priority: Medium     Pseudophakia OU 11/06/2013     Priority: Medium     Esotropia chronic c prism 11/06/2013     Priority: Medium     Bronchiectasis (H)      Priority: Medium     cough on inhalers recurrent infections  Problem list name updated by automated process. Provider to review and confirm  [employer] Update utility       Cerebral infarction (H)      Priority: Medium     Trigger thumb - right 12/18/2012     Priority: Medium     Trigger finger, acquired - right ring 07/24/2012     Priority: Medium     Insomnia 11/28/2011     Priority: Medium     Hyperlipidemia LDL goal <130 11/22/2010     Priority: Medium     SHOULDER IMPINGEMENT - left 04/27/2010     Priority: Medium       Past Surgical History:  Past Surgical History:   Procedure Laterality Date     APPENDECTOMY       BREAST BIOPSY, RT/LT      Breat Biopsy RT/LT     CARDIAC SURGERY  12/03/2017    STINT     CATARACT IOL, RT/LT       COLONOSCOPY  06/00   And 2008    diverticulosis     COLONOSCOPY  01/13/2012     ENDOSCOPY  01/13/2012     EXTRACAPSULAR CATARACT EXTRATION WITH INTRAOCULAR LENS IMPLANT  01/2000    both eyes     FLEXIBLE SIGMOIDOSCOPY  06/95,08/98     JOINT REPLACEMTN, KNEE RT/LT  1992    right     YAG CAPSULOTOMY OD (RIGHT EYE) Right 01/31/2023    Dr. Rodriguez     YAG CAPSULOTOMY OS (LEFT EYE) Left 02/07/2023    Dr. Rodriguez     ZZ TOTAL KNEE ARTHROPLASTY  06/25/2003    left poly  exchange     Medications:  Current Outpatient Medications   Medication Sig Dispense Refill     Acetaminophen (TYLENOL PO) Take 500 mg by mouth 2 times daily       aspirin 81 MG tablet Take by mouth daily       azithromycin (ZITHROMAX) 250 MG tablet 1 tablet every M,W,F       Docusate Sodium (COLACE PO) Take by mouth 2 times daily       ipratropium - albuterol 0.5 mg/2.5 mg/3 mL (DUONEB) 0.5-2.5 (3) MG/3ML neb solution Take 1 vial by nebulization every 6 hours as needed for shortness of breath / dyspnea or wheezing       isosorbide dinitrate (ISORDIL) 30 MG tablet Take 30 mg by mouth daily       LANsoprazole (PREVACID) 15 MG DR capsule Take 15 mg by mouth       levothyroxine (SYNTHROID/LEVOTHROID) 25 MCG tablet Take 1 tablet (25 mcg) by mouth daily 90 tablet 3     metoprolol tartrate (LOPRESSOR) 50 MG tablet Take 0.5 tablets (25 mg) by mouth 2 times daily Take one tablet (50 mg) each morning, and take 1/2 tablet (25 mg) at night (Patient taking differently: Take 25 mg by mouth 2 times daily 1/2 a pill BID) 90 tablet 3     Nitroglycerin (NITROTAB SL) Place 0.4 mg under the tongue       polyethylene glycol (MIRALAX/GLYCOLAX) Packet Take 1 packet by mouth daily       psyllium (METAMUCIL) WAFR Take 2 Wafers by mouth daily       rosuvastatin (CRESTOR) 10 MG tablet Take 1 tablet (10 mg) by mouth daily 90 tablet 3     Allergies:     Allergies   Allergen Reactions     Zocor [Hmg-Coa-R Inhibitors]      Social History:  Home situation: home, lives with son and daughters help  Occupation/Schooling: retired   Tobacco use: N  Alcohol use: N  Drug use: N  History of chemical dependency treatment: N    Family history:  Family History   Problem Relation Age of Onset     Breast Cancer Mother      Arthritis Mother      Thyroid Disease Mother      Cardiovascular Father      Asthma Maternal Grandmother      Cancer Brother      Genetic Disorder Brother      Other Cancer Brother         Esophageal     Cancer Son         tonsil and tongue,  "bladder     Alcohol/Drug Son      Other Cancer Son      Heart Disease Daughter      Asthma Daughter      Genetic Disorder Brother      Other Cancer Brother      Glaucoma No family hx of      Macular Degeneration No family hx of        Review of Systems:    POSTIVE IN BOLD  GENERAL: fever/chills, fatigue, general unwell feeling, weight gain/loss.  HEAD/EYES:  headache, dizziness, or vision changes.    EARS/NOSE/THROAT:  Nosebleeds, hearing loss, sinus infection, earache, tinnitus.  IMMUNE:  Allergies, cancer, immune deficiency, or infections.  SKIN:  Urticaria, rash, hives  HEME/Lymphatic:   anemia, easy bruising, easy bleeding.  RESPIRATORY:  cough, wheezing, or shortness of breath  CARDIOVASCULAR/Circulation:  Extremity edema, syncope, hypertension, tachycardia, or angina.  GASTROINTESTINAL:  abdominal pain, nausea/emesis, diarrhea, constipation,  hematochezia, or melena.  ENDOCRINE:  Diabetes, steroid use,  thyroid disease or osteoporosis.  MUSCULOSKELETAL: neck pain, back pain, arthralgia, arthritis, or gout. L hip pain that radiates to lateral thigh but does not go beyond knee. Shoulder pain  GENITOURINARY:  frequency, urgency, dysuria, difficulty voiding, hematuria or incontinence.  NEUROLOGIC:  weakness, numbness, paresthesias, seizure, tremor, stroke or memory loss.  PSYCHIATRIC:  depression, anxiety, stress, suicidal thoughts or mood swings.     Physical Exam:  Vitals:    03/08/23 0704   BP: 137/77   BP Location: Right arm   Patient Position: Chair   Cuff Size: Adult Regular   Pulse: 64   SpO2: 98%   Weight: 53.1 kg (117 lb)   Height: 1.511 m (4' 11.5\")     Exam:  Constitutional: healthy, alert and no distress  Head: normocephalic. Atraumatic.   Eyes: no redness or jaundice noted   ENT: oropharnx normal.  MMM.  Neck supple.    Cardiovascular: RRR no m/g/r   Respiratory: clear   Gastrointestinal: soft, non-tender, normoactive bowel sounds   : deferred  Skin: no suspicious lesions or rashes  Psychiatric: " "mentation appears normal and affect normal/bright    Musculoskeletal exam:  Gait/Station/Posture: slightly hunched forward  Cervical spine: full ROM  Thoracic spine:  scoliosis    Lumbar spine: full ROM    Myofascial tenderness:  N      Neurologic exam:  CN:  Cranial nerves 2-12 are normal  Motor:  5/5 UE and LE strength  Sensory:  (upper and lower extremities):   Light touch: normal    Vibration: normal    Allodynia: absent    Dysethesia: absent    Hyperalgesia: absent     Diagnostic tests:  MRI of T and L spine was completed on 12/15/21 showing:  \"IMPRESSION:  1. Multilevel degenerative changes, as described.  2. Scoliosis and mild multilevel degenerative spondylolisthesis.  3. No high-grade spinal canal stenosis.  4. Moderate left T12-L1 neural foraminal narrowing. Mild/moderate  right T3-T4 neural foraminal narrowing. Lesser degrees of neural  foraminal stenosis elsewhere.\"    Personally reviewed imaging XR and MA    Other testing (labs, diagnostics) reviewed:  Labs  EKG    [place] Prescription Monitoring Program reviewed NA    Outside records reviewed:Y      Assessment:  Thoracolumbar Scoliosis  Lumbar Spondylosis  Thoracolumbar Radiculopathy      Ingrid Culver is a 89 year old female who presents with the complaints of mid back pain that she's been treated in the past with T12-L1 epidural injection. She presented today with the same symptoms prior to the previous injection, with more intense severity over the past couple of months. Her history, imaging, and exam are consistent with the above diagnoses and we will plan on scheduling her for a repeat epidural steroid injection    Plan:  Diagnosis reviewed, treatment option addressed, and risk/benefits discussed.  Self-care instructions given.  I am recommending a multidisciplinary treatment plan to help this patient better manage her pain.      1. Physical Therapy: referred   2. TENS Unit: ordered  3. Pain Psychologist to address issues of relaxation, behavioral " change, coping style, and other factors important to improvement: N  4. Diagnostic Studies: N  5. Medication Management: N  6. Further procedures recommended: thoracolumbar epidural injection  7. Recommendations/follow-up for PCP:  N  8. Follow up: thoracolumbar epidural injection, and f/u 6 weeks after the procedure    Total time spent was 50 minutes, and more than 50% of face to face time was spent in counseling and/or coordination of care regarding principles of multidisciplinary care, medication management, and thoracolumbar epidural injection.    I saw and examined the patient with the Pain Fellow/Resident. I have reviewed and agree with the resident's note and plan of care and made changes and corrections directly to the body of the note.    TIME SPENT:  BY FELLOW/RESIDENT ALONE 20 MIN  BY MYSELF AND FELLOW/RESIDENT TOGETHER 30 MIN    This time includes time for chart review, preparation, and documentation.     Malu Rojas MD  Pain Medicine, Department of Anesthesiology  , [facility]

## 2023-03-08 NOTE — PROGRESS NOTES
[facility] Consultation    Date of visit: 3/8/2023    Reason for consultation:    Ingrid Culver is a 89 year old female who is seen in consultation today at the request of her provider, Radha Sheets MD.    Primary Care Provider is Radha Sheets.  Pain medications are being prescribed by PCP.    Please see the [facility] health questionnaire which the patient completed and reviewed with me in detail.    Chief Complaint:    Chief Complaint   Patient presents with     Consult     Consult Lower Back, Hips, Ribs pain       Pain history:  Ingrid Culver is a 89 year old female who first started having problems with pain in the back. She has gotten T12-L1 epidural injection 1 year ago, which helped somewhat, but did not return for the second injection d/t insurance issues. The pain radiates around the abdomen bilaterally. She is sitting around more so that the pain doesn't aggravete, thus the patient and the family member would like to take care of the pain for her to be more active again. She was hoping to see Dr. Jeff but the pain got worse over the past couple of month, so she decided to come to this clinic as she wanted to be seen sooner. The pain that she describes now is the same as was previously treated with the T12-L1 epidural steroid injection. However, she notes the pain has returned since the injection and has become worse, significantly limiting her function. She has thoracolumbare scoliosis that is causing her current symptoms. She has discussed potential surgery for correcting this. However, she does not want to have surgery at this point.       Pain rating: intensity ranges from 5/10 to 10/10, and Averages 2/10 on a 0-10 scale.  Aggravating factors include: standing  Relieving factors include: resting  Any bowel or bladder incontinence: the ptn takes colace and miralax    Current treatments include:  Tylenol     Previous  medication treatments included:  None     Other treatments have included:  Ingrid Culver has been seen at a pain clinic in the past.  Dr. Jeff, who did T12-L1 epidural injection  PT: N  Chiropractor: Y, not for the past 3 months  Acupuncture: N  TENs Unit: Y, used by chiropractor  Injections: Y, T12-L1 epidural injection    Past Medical History:  Past Medical History:   Diagnosis Date     Allergies     idiopathic, anaphyllactic rx's     Bronchiectasis     cough on inhalers recurrent infections     Cataract      Cerebral infarction (H)      COPD (chronic obstructive pulmonary disease) (H)      Disorders of lipoid metabolism     hx of on low fat diet     Esotropia chronic c prism 11/6/2013     Heart disease      Hypertension goal BP (blood pressure) < 150/90      Impingement syndrome of right shoulder 11/28/2022     Knee pain      Other bone/cartilage disease      Post-Nasal Drainage      Unspecified asthma(493.90)     Asthma     Unspecified hypothyroidism      Uterine fibroid     10/08 U/s shows 4X2.9X3/3     Patient Active Problem List    Diagnosis Date Noted     Stage 3a chronic kidney disease (H) 02/17/2023     Priority: Medium     Impingement syndrome of right shoulder 11/28/2022     Priority: Medium     Age-related osteoporosis without current pathological fracture 11/19/2021     Priority: Medium     COPD (chronic obstructive pulmonary disease) (H)      Priority: Medium     Peripheral arterial disease (H) 11/10/2020     Priority: Medium     Coronary artery disease involving native coronary artery of native heart with angina pectoris (H)      Priority: Medium     Replacing diagnoses that were inactivated after the 10/1/2021 regulatory import.       Hypothyroidism due to acquired atrophy of thyroid 01/17/2017     Priority: Medium     Hypertension goal BP (blood pressure) < 150/90 01/02/2017     Priority: Medium     Ganglion cyst of finger of right hand 08/16/2016     Priority: Medium     Other idiopathic  scoliosis, thoracolumbar region 12/16/2015     Priority: Medium     Dry eyes, bilateral 11/10/2015     Priority: Medium     Vitreous syneresis 11/06/2013     Priority: Medium     PVD (posterior vitreous detachment) 11/06/2013     Priority: Medium     Pseudophakia OU 11/06/2013     Priority: Medium     Esotropia chronic c prism 11/06/2013     Priority: Medium     Bronchiectasis (H)      Priority: Medium     cough on inhalers recurrent infections  Problem list name updated by automated process. Provider to review and confirm  [employer] Update utility       Cerebral infarction (H)      Priority: Medium     Trigger thumb - right 12/18/2012     Priority: Medium     Trigger finger, acquired - right ring 07/24/2012     Priority: Medium     Insomnia 11/28/2011     Priority: Medium     Hyperlipidemia LDL goal <130 11/22/2010     Priority: Medium     SHOULDER IMPINGEMENT - left 04/27/2010     Priority: Medium       Past Surgical History:  Past Surgical History:   Procedure Laterality Date     APPENDECTOMY       BREAST BIOPSY, RT/LT      Breat Biopsy RT/LT     CARDIAC SURGERY  12/03/2017    [facility]     CATARACT IOL, RT/LT       COLONOSCOPY  06/00   And 2008    diverticulosis     COLONOSCOPY  01/13/2012     ENDOSCOPY  01/13/2012     EXTRACAPSULAR CATARACT EXTRATION WITH INTRAOCULAR LENS IMPLANT  01/2000    both eyes     FLEXIBLE SIGMOIDOSCOPY  06/95,08/98     JOINT REPLACEMTN, KNEE RT/LT  1992    right     YAG CAPSULOTOMY OD (RIGHT EYE) Right 01/31/2023    Dr. Rodriguez     YAG CAPSULOTOMY OS (LEFT EYE) Left 02/07/2023    Dr. Rodriguez     [facility] TOTAL KNEE ARTHROPLASTY  06/25/2003    left poly exchange     Medications:  Current Outpatient Medications   Medication Sig Dispense Refill     Acetaminophen (TYLENOL PO) Take 500 mg by mouth 2 times daily       aspirin 81 MG tablet Take by mouth daily       azithromycin (ZITHROMAX) 250 MG tablet 1 tablet every M,W,F       Docusate Sodium (COLACE PO) Take by mouth 2 times daily       ipratropium -  albuterol 0.5 mg/2.5 mg/3 mL (DUONEB) 0.5-2.5 (3) MG/3ML neb solution Take 1 vial by nebulization every 6 hours as needed for shortness of breath / dyspnea or wheezing       isosorbide dinitrate (ISORDIL) 30 MG tablet Take 30 mg by mouth daily       LANsoprazole (PREVACID) 15 MG DR capsule Take 15 mg by mouth       levothyroxine (SYNTHROID/LEVOTHROID) 25 MCG tablet Take 1 tablet (25 mcg) by mouth daily 90 tablet 3     metoprolol tartrate (LOPRESSOR) 50 MG tablet Take 0.5 tablets (25 mg) by mouth 2 times daily Take one tablet (50 mg) each morning, and take 1/2 tablet (25 mg) at night (Patient taking differently: Take 25 mg by mouth 2 times daily 1/2 a pill BID) 90 tablet 3     Nitroglycerin (NITROTAB SL) Place 0.4 mg under the tongue       polyethylene glycol (MIRALAX/GLYCOLAX) Packet Take 1 packet by mouth daily       psyllium (METAMUCIL) WAFR Take 2 Wafers by mouth daily       rosuvastatin (CRESTOR) 10 MG tablet Take 1 tablet (10 mg) by mouth daily 90 tablet 3     Allergies:     Allergies   Allergen Reactions     Zocor [Hmg-Coa-R Inhibitors]      Social History:  Home situation: home, lives with son and daughters help  Occupation/Schooling: retired   Tobacco use: N  Alcohol use: N  Drug use: N  History of chemical dependency treatment: N    Family history:  Family History   Problem Relation Age of Onset     Breast Cancer Mother      Arthritis Mother      Thyroid Disease Mother      Cardiovascular Father      Asthma Maternal Grandmother      Cancer Brother      Genetic Disorder Brother      Other Cancer Brother         Esophageal     Cancer Son         tonsil and tongue, bladder     Alcohol/Drug Son      Other Cancer Son      Heart Disease Daughter      Asthma Daughter      Genetic Disorder Brother      Other Cancer Brother      Glaucoma No family hx of      Macular Degeneration No family hx of        Review of Systems:    POSTIVE IN BOLD  GENERAL: fever/chills, fatigue, general unwell feeling, weight  "gain/loss.  HEAD/EYES:  headache, dizziness, or vision changes.    EARS/NOSE/THROAT:  Nosebleeds, hearing loss, sinus infection, earache, tinnitus.  IMMUNE:  Allergies, cancer, immune deficiency, or infections.  SKIN:  Urticaria, rash, hives  HEME/Lymphatic:   anemia, easy bruising, easy bleeding.  RESPIRATORY:  cough, wheezing, or shortness of breath  CARDIOVASCULAR/Circulation:  Extremity edema, syncope, hypertension, tachycardia, or angina.  GASTROINTESTINAL:  abdominal pain, nausea/emesis, diarrhea, constipation,  hematochezia, or melena.  ENDOCRINE:  Diabetes, steroid use,  thyroid disease or osteoporosis.  MUSCULOSKELETAL: neck pain, back pain, arthralgia, arthritis, or gout. L hip pain that radiates to lateral thigh but does not go beyond knee. Shoulder pain  GENITOURINARY:  frequency, urgency, dysuria, difficulty voiding, hematuria or incontinence.  NEUROLOGIC:  weakness, numbness, paresthesias, seizure, tremor, stroke or memory loss.  PSYCHIATRIC:  depression, anxiety, stress, suicidal thoughts or mood swings.     Physical Exam:  Vitals:    03/08/23 0704   BP: 137/77   BP Location: Right arm   Patient Position: Chair   Cuff Size: Adult Regular   Pulse: 64   SpO2: 98%   Weight: 53.1 kg (117 lb)   Height: 1.511 m (4' 11.5\")     Exam:  Constitutional: healthy, alert and no distress  Head: normocephalic. Atraumatic.   Eyes: no redness or jaundice noted   ENT: oropharnx normal.  MMM.  Neck supple.    Cardiovascular: RRR no m/g/r   Respiratory: clear   Gastrointestinal: soft, non-tender, normoactive bowel sounds   : deferred  Skin: no suspicious lesions or rashes  Psychiatric: mentation appears normal and affect normal/bright    Musculoskeletal exam:  Gait/Station/Posture: slightly hunched forward  Cervical spine: full ROM  Thoracic spine:  scoliosis    Lumbar spine: full ROM    Myofascial tenderness:  N      Neurologic exam:  CN:  Cranial nerves 2-12 are normal  Motor:  5/5 UE and LE strength  Sensory:  (upper " "and lower extremities):   Light touch: normal    Vibration: normal    Allodynia: absent    Dysethesia: absent    Hyperalgesia: absent     Diagnostic tests:  MRI of T and L spine was completed on 12/15/21 showing:  \"IMPRESSION:  1. Multilevel degenerative changes, as described.  2. Scoliosis and mild multilevel degenerative spondylolisthesis.  3. No high-grade spinal canal stenosis.  4. Moderate left T12-L1 neural foraminal narrowing. Mild/moderate  right T3-T4 neural foraminal narrowing. Lesser degrees of neural  foraminal stenosis elsewhere.\"    Personally reviewed imaging XR and MA    Other testing (labs, diagnostics) reviewed:  Labs  EKG    [place] Prescription Monitoring Program reviewed NA    Outside records reviewed:Y      Assessment:  Thoracolumbar Scoliosis  Lumbar Spondylosis  Thoracolumbar Radiculopathy      Ingrid Culver is a 89 year old female who presents with the complaints of mid back pain that she's been treated in the past with T12-L1 epidural injection. She presented today with the same symptoms prior to the previous injection, with more intense severity over the past couple of months. Her history, imaging, and exam are consistent with the above diagnoses and we will plan on scheduling her for a repeat epidural steroid injection    Plan:  Diagnosis reviewed, treatment option addressed, and risk/benefits discussed.  Self-care instructions given.  I am recommending a multidisciplinary treatment plan to help this patient better manage her pain.      1. Physical Therapy: referred   2. TENS Unit: ordered  3. Pain Psychologist to address issues of relaxation, behavioral change, coping style, and other factors important to improvement: N  4. Diagnostic Studies: N  5. Medication Management: N  6. Further procedures recommended: thoracolumbar epidural injection  7. Recommendations/follow-up for PCP:  N  8. Follow up: thoracolumbar epidural injection, and f/u 6 weeks after the procedure    Total time spent " was 50 minutes, and more than 50% of face to face time was spent in counseling and/or coordination of care regarding principles of multidisciplinary care, medication management, and thoracolumbar epidural injection.    I saw and examined the patient with the Pain Fellow/Resident. I have reviewed and agree with the resident's note and plan of care and made changes and corrections directly to the body of the note.    TIME SPENT:  BY FELLOW/RESIDENT ALONE 20 MIN  BY MYSELF AND FELLOW/RESIDENT TOGETHER 30 MIN    This time includes time for chart review, preparation, and documentation.     Malu Rojas MD  Pain Medicine, Department of Anesthesiology  , [facility]

## 2023-03-08 NOTE — NURSING NOTE
Patient presents with:  Consult: Consult Lower Back, Hips, Ribs pain      Extreme Pain (9)     Pain Medications     Analgesics Other Refills Start End     Acetaminophen (TYLENOL PO)          Sig - Route: Take 500 mg by mouth 2 times daily - Oral    Class: Historical    Salicylates Refills Start End     aspirin 81 MG tablet          Sig - Route: Take by mouth daily - Oral    Class: Historical          What medications are you using for pain? Tylenol    (New patients only) Have you been seen by another pain clinic/ provider? yes    (Return Patients only) What refills are you needing today? No    Expectation If there is some kind of solution to help with her pain

## 2023-03-08 NOTE — NURSING NOTE
RN read through the instructions with the patient for the recommended procedure: Thoracolumbar epidural steroid injection  Patient verbalized understanding to holding appropriate medication per protocol and was agreeable to NPO policy and needing a .    Anticoagulant: None reported    Recommended Follow Up: 6 weeks after procedure    Breanna Russell RN

## 2023-03-08 NOTE — PATIENT INSTRUCTIONS
Referrals:    Physical Therapy Referral placed-   If you have not heard from the scheduling office within 2 business days, please call [phone] for all locations       Procedures:    Call to schedule your procedure: [phone] option #2    Thoracolumbar Epidural Steroid Injection    Your pre-procedure instructions are below, please call our clinic if you have any questions.      Treatment planning:    TENS Unit ordered.    [facility]:        [facility] [place]  [address], [address]  [place] [address] [phone]          [facility] [place]  [address] [place] [address] [phone]        [facility] [place]  [address] [place] [address] [phone]        [facility] - [place], [place] [id number] [phone]        [facility] [place] [address] [phone]        [facility] - [place], [place] [id number] [phone]       Recommended Follow up:      Follow up 6 weeks after procedure.          Please call [phone] to schedule your clinic appointment if you don't already have an appointment scheduled.      Procedure Information related to COVID-19     Please call [phone] option #2 to schedule, reschedule, or cancel your procedure appointment.   Phones are answered Monday - Friday from 08:00 - 4:30pm.  Leave a voicemail with your name, birth date, and phone number if no one is available to take your call.        You no longer need to test for COVID- 19 prior to your procedure/surgery, unless your physician specifically requests that you test. If you experience COVID symptoms or have tested positive for COVID-19 within 14 days of your scheduled surgery or procedure, please update our  office right away and your procedure may have to be postponed.       The procedure center staff will call you several days before the procedure to review important information that you will need to know for the day of the procedure.     Please contact the clinic if you have further questions about this information [phone].        Information related to Scheduling and Pre-Procedure Instructions:    If you must reschedule your procedure more than two times, you must follow up in clinic before rescheduling again.    Preparing for your procedure    CAUTION - FAILURE TO FOLLOW THESE PRE-PROCEDURE INSTRUCTIONS WILL RESULT IN YOUR PROCEDURE BEING RESCHEDULED.    Your Procedure: Thoracolumbar epidural steroid injection      You must have a  take you home after your procedure. Transportation by taxi or para-transit is okay as long as you have a responsible adult accompany you. You must provide your 's full name and contact number at time of check in.     Fasting Protocol  Please have nothing to eat or drink 1 hour prior to arrival.     Medications If you take any medications, DO NOT STOP. Take your medications as usual the day of your procedure with a sip of water AT LEAST 2 HOURS PRIOR TO ARRIVAL.    Antibiotics If you are currently taking antibiotics, you must complete the entire dose 7 days prior to your scheduled procedure. You must be clear of any signs or symptoms of infection. If you begin antibiotics, please contact our clinic for instructions.     Fever, Chills, or Rash If you experience a fever of higher than 100 degrees, chills, rash, or open wounds during the one week before your procedure, please call the clinic to see if you may proceed with your procedure.      Medication Hold List  **Patients under Cardiology/Neurology care should consult their provider prior to the pain procedure to verify pre-procedure medication instructions. The information below contains general  guidelines.**      Blood Thinners If you are taking daily ASPIRIN, PLAVIX, OR OTHER BLOOD THINNERS SUCH AS COUMADIN/WARFARIN, we will need your prescribing doctor to sign a release permitting you to stop these medications. Once approved by your prescribing doctor - STOP ALL BLOOD THINNERS BASED ON THE TIME TABLE BELOW PRIOR TO YOUR PROCEDURE. If you have been instructed to stop WARFARIN(COUMADIN), you must have an INR lab drawn the day before your procedure. . Your INR must be within normal limits before we can perform your injection. MEDICATIONS CAN BE RESTARTED AFTER YOUR PROCEDURE.    14 DAY HOLD  Ticlid (ticlopidine)    10 DAY HOLD  Effient (Prasugel)    3 DAY HOLD  Xarelto (rivaroxaban) 7 DAY HOLD  Anacin, Bufferin, Ecotrin, Excedrin, Aggrenox (Aspirin)  Brilinta (ticagrelor)  Coumadin (Warfarin)  Pradexa (Dabigatran)  Elmiron (Pentosan)  Plavix (Clopidogrel Bisulfate)  Pletal (Cilostazol)    24 HOUR HOLD  Lovenox (enoxaparin)  Agrylin (Anagrelide)        Non-steroidal Anti-inflammatories (NSAIDs) DO NOT TAKE any non-steroidal anti-inflammatory medications (NSAIDs) listed on the table below. MEDICATIONS CAN BE RESTARTED AFTER YOUR PROCEDURE. Celebrex is OK to take and does not need to be discontinued.     Medications to stop:  3 DAY HOLD  Advil, Motrin (Ibuprofen)  Arthrotec (diciofenac sodium/misoprostol)  Clinoril (Sulindac)  Indocin (Indomethacin)  Lodine (Etodolac)  Toradol (Ketorolac)  Vicoprofen (Hydrocodone and Ibuprofen)  Voltaren (Diclofenac)  Apixaban (Eliquis)    14 DAY HOLD  Daypro (Oxaprozin)  Feldene (Piroxicam)   7 DAY HOLD  Aleve (Naproxen sodium)  Darvon compound (contains aspirin)  Naprosyn (Naproxen)  Norgesic Forte (contains aspirin)  Mobic (Meloxicam)  Oruvall (Ketoprofen)  Percodan (contains aspirin)  Relafen (Nabumetone)  Salsalate  Trilisate  Vitamin E (more than 400 mg per day)  Any medication containing aspirin                To speak with a nurse, schedule/reschedule/cancel a clinic  appointment, or request a medication refill call: [phone]    You can also reach us by [employer]: [email or web address]

## 2023-03-08 NOTE — TELEPHONE ENCOUNTER
Writer attempted to reach the patient to schedule pain procedure. Left message with callback number [phone].    Rafael Shields on 3/8/2023 at 11:22 AM

## 2023-03-10 ENCOUNTER — TELEPHONE (OUTPATIENT)
Dept: PALLIATIVE MEDICINE | Facility: CLINIC | Age: 88
End: 2023-03-10
Payer: COMMERCIAL

## 2023-03-10 NOTE — TELEPHONE ENCOUNTER
Patient is scheduled for procedure with Dr. Rojas     Spoke with: Patient    Date of Procedure: 3/30     Location: [facility]     Informed patient they will need an adult  yes     Additional comments: Spoke with patient, they are aware of above date and time   Patient is aware pre-op RN will call 2-3 days prior to procedure with arrival time and instructions    Anna C. Schoenecker on 3/10/2023 at 12:00 PM

## 2023-03-30 ENCOUNTER — ANCILLARY PROCEDURE (OUTPATIENT)
Dept: RADIOLOGY | Facility: AMBULATORY SURGERY CENTER | Age: 88
End: 2023-03-30
Attending: ANESTHESIOLOGY
Payer: COMMERCIAL

## 2023-03-30 ENCOUNTER — HOSPITAL ENCOUNTER (OUTPATIENT)
Facility: AMBULATORY SURGERY CENTER | Age: 88
Discharge: HOME | End: 2023-03-30
Attending: ANESTHESIOLOGY | Admitting: ANESTHESIOLOGY
Payer: COMMERCIAL

## 2023-03-30 VITALS
HEART RATE: 78 BPM | WEIGHT: 117 LBS | SYSTOLIC BLOOD PRESSURE: 153 MMHG | TEMPERATURE: 96.7 F | HEIGHT: 59 IN | BODY MASS INDEX: 23.59 KG/M2 | RESPIRATION RATE: 19 BRPM | OXYGEN SATURATION: 99 % | DIASTOLIC BLOOD PRESSURE: 84 MMHG

## 2023-03-30 DIAGNOSIS — M41.25 OTHER IDIOPATHIC SCOLIOSIS, THORACOLUMBAR REGION: ICD-10-CM

## 2023-03-30 PROCEDURE — 62323 NJX INTERLAMINAR LMBR/SAC: CPT

## 2023-03-30 PROCEDURE — 62323 NJX INTERLAMINAR LMBR/SAC: CPT | Performed by: ANESTHESIOLOGY

## 2023-03-30 RX ORDER — IOPAMIDOL 408 MG/ML
INJECTION, SOLUTION INTRATHECAL PRN
Status: DISCONTINUED | OUTPATIENT
Start: 2023-03-30 | End: 2023-03-30 | Stop reason: HOSPADM

## 2023-03-30 RX ORDER — BUPIVACAINE HYDROCHLORIDE 2.5 MG/ML
INJECTION, SOLUTION EPIDURAL; INFILTRATION; INTRACAUDAL PRN
Status: DISCONTINUED | OUTPATIENT
Start: 2023-03-30 | End: 2023-03-30 | Stop reason: HOSPADM

## 2023-03-30 RX ORDER — METHYLPREDNISOLONE ACETATE 40 MG/ML
INJECTION, SUSPENSION INTRA-ARTICULAR; INTRALESIONAL; INTRAMUSCULAR; SOFT TISSUE PRN
Status: DISCONTINUED | OUTPATIENT
Start: 2023-03-30 | End: 2023-03-30 | Stop reason: HOSPADM

## 2023-03-30 RX ORDER — LIDOCAINE HYDROCHLORIDE 10 MG/ML
INJECTION, SOLUTION EPIDURAL; INFILTRATION; INTRACAUDAL; PERINEURAL PRN
Status: DISCONTINUED | OUTPATIENT
Start: 2023-03-30 | End: 2023-03-30 | Stop reason: HOSPADM

## 2023-03-30 NOTE — H&P
ABBREVIATED H&P [facility]      Patient Name: Ingrid Culver   MRN: [id number]   YOB: 1932     1. Reason for Procedure:  Procedure Summary     Date: 03/30/23 Room / Location: [facility] PROCEDURE ROOM 06 / [facility]    Anesthesia Start:  Anesthesia Stop:     Procedure: Thoracolumbar Epidural Steroid Injection (Spine) Diagnosis:       Other idiopathic scoliosis, thoracolumbar region      (Other idiopathic scoliosis, thoracolumbar region [M41.25])    Providers: Malu Rojas MD Responsible Provider:     Anesthesia Type: Not recorded ASA Status: Not recorded          2. History:   Past Medical History:   Diagnosis Date     Allergies     idiopathic, anaphyllactic rx's     Bronchiectasis     cough on inhalers recurrent infections     Cataract      Cerebral infarction (H)      COPD (chronic obstructive pulmonary disease) (H)      Disorders of lipoid metabolism     hx of on low fat diet     Esotropia chronic c prism 11/6/2013     Heart disease      Hypertension goal BP (blood pressure) < 150/90      Impingement syndrome of right shoulder 11/28/2022     Knee pain      Other bone/cartilage disease      Post-Nasal Drainage      Unspecified asthma(493.90)     Asthma     Unspecified hypothyroidism      Uterine fibroid     10/08 U/s shows 4X2.9X3/3       Comorbidities: None    Any history of sleep apnea? No    Any history of problems with sedation? No    3. Physical:    General: Normal  Skin:  Normal.  Respiratory: Clear to auscultation bilateral, no wheezing  Cardio:  Regular rate and rhythm  Abdomen: Soft, nontender, nondistended, no palpable masses.  Musculoskeletal: Normal  Neuro: Sensory exam normal, motor exam 5/5, bilateral upper and lower extremities      4. Current Medications (if not in Epic):   Current Outpatient Medications   Medication Sig Dispense Refill     Acetaminophen (TYLENOL PO) Take 500 mg by mouth 2 times daily        aspirin 81 MG tablet Take by mouth daily       azithromycin (ZITHROMAX) 250 MG tablet 1 tablet every M,W,F       Docusate Sodium (COLACE PO) Take by mouth 2 times daily       ipratropium - albuterol 0.5 mg/2.5 mg/3 mL (DUONEB) 0.5-2.5 (3) MG/3ML neb solution Take 1 vial by nebulization every 6 hours as needed for shortness of breath / dyspnea or wheezing       isosorbide dinitrate (ISORDIL) 30 MG tablet Take 30 mg by mouth daily       LANsoprazole (PREVACID) 15 MG DR capsule Take 15 mg by mouth       levothyroxine (SYNTHROID/LEVOTHROID) 25 MCG tablet Take 1 tablet (25 mcg) by mouth daily 90 tablet 3     metoprolol tartrate (LOPRESSOR) 50 MG tablet Take 0.5 tablets (25 mg) by mouth 2 times daily Take one tablet (50 mg) each morning, and take 1/2 tablet (25 mg) at night (Patient taking differently: Take 25 mg by mouth 2 times daily 1/2 a pill BID) 90 tablet 3     Nitroglycerin (NITROTAB SL) Place 0.4 mg under the tongue       polyethylene glycol (MIRALAX/GLYCOLAX) Packet Take 1 packet by mouth daily       psyllium (METAMUCIL) WAFR Take 2 Wafers by mouth daily       rosuvastatin (CRESTOR) 10 MG tablet Take 1 tablet (10 mg) by mouth daily 90 tablet 3        5. Allergies and Reactions:  is allergic to zocor [hmg-coa-r inhibitors].

## 2023-03-30 NOTE — DISCHARGE INSTRUCTIONS
"PAIN INJECTION HOME CARE INSTRUCTIONS  Activity  -You may resume most normal activity levels with the exception of strenuous activity. It may help to move in ways that hurt before the injection, to see if the pain is still there, but do not overdo it.     -DO NOT remove bandaid for 24 hours  -DO NOT shower for 24 hours      Pain  -You may feel immediate pain relief and numbness for a period of time after the injection. This may indicate the medication has reached the right spot.  -Your pain may return after this short pain-free period, or may even be a little worse for a day or two. It may be caused by needle irritation or by the medication itself. The medications usually take two or three days to start working, but can take as long as a week.    -You may use an ice pack for 20 minutes every 2 hours for the first 24 hours  -You may use a heating pad after the first 24 hours  -You may use Tylenol (acetaminophen) every 4 hours or other pain medicines as directed by your physician    DID YOU RECEIVE STEROIDS TODAY?  {YES / NO:[id number]::\"Yes\"}    Common side effects of steroids:  Not everyone will experience corticosteroid side effects. If side effects are experienced, they will gradually subside in the 7-10 day period following an injection. Most common side effects include:  -Flushed face and/or chest  -Feeling of warmth, particularly in the face but could be an overall feeling of warmth  -Increased blood sugar in diabetic patients  -Menstrual irregularities my occur. If taking hormone-based birth control an alternate method of birth control is recommended  -Sleep disturbances and/or mood swings are possible  -Leg cramps    PLEASE KEEP TRACK OF YOUR SYMPTOMS AND NOTE ANY CHANGES FOR YOUR DOCTOR.       Please contact us if you have:  -Severe pain  -Fever more than 101.5 degrees Fahrenheit  -Signs of infection at the injection site (redness, swelling, or drainage)      FOR PAIN CENTER PATIENTS:  If you have questions, " please contact the Pain Clinic at [phone] Option 1 between the hours of 7:00 am and 3:00 pm Monday through Friday. After office hours you can contact the on call provider by dialing [phone]. If you need immediate attention, we recommend that you go to a hospital emergency room or dial [phone]. If you need to Fax information, the number is [phone].

## 2023-03-30 NOTE — OP NOTE
Patient: Ingrid Culver Age: 89 year old   MRN: [id number] Attending: Dr. Rojas     Date of Visit: March 30, 2023      PAIN MEDICINE CLINIC PROCEDURE NOTE    ATTENDING CLINICIAN:    Malu Rojas MD    ASSISTANT CLINICIAN:      PREPROCEDURE DIAGNOSES:  1.  Lumbar radiculopathy  2.  Chronic low back pain       PROCEDURE(S) PERFORMED:  1.  Interlaminar lumbar epidural steroid injection   2.  Fluoroscopic guidance for the above-named procedure(s)      ANESTHESIA:  Local.    INDICATIONS:  Ingrid Culver is a 89 year old female with a history of  chronic low back pain secondary to bilateral Thoracolumbar radiculopathy .  The patient stated that the patient was in their usual state of health and denied recent anticoagulant use or recent infections.  Therefore, the plan is to perform above mentioned procedures.     Procedure Details:  The patient was met in the procedure room, where the patient was identified by name, medical record number and date of birth.  All of the patient s last minute questions were answered. Written informed consent was obtained and saved in the electronic medical record, after the risks, benefits, and alternatives were discussed with the patient.      A formal time-out procedure was performed, as per protocol, including patient name, title of procedure, and site of procedure, and all in the room concurred.  Routine monitors were applied.      The patient was placed in the prone position on the procedure room table.  All pressure points were checked and comfortably padded.  Routine monitors were placed.  Vital signs were stable.    A chlorhexidine prep was completed followed by sterile draping per standard procedure.     The AP fluoroscopic view was optimized for approach at  T12-L1 interspace.  The skin over the interspace was infiltrated with 4-5 mL of 1% Lidocaine using a 25 gauge, 1.5 inch needle.  A 18-gauge 3-1/2 inch Tuohy needle was advanced under fluoroscopic guidance with  right paramedial approach until it touched lamina. Mutiple AP and lateral fluoroscopic images at this time  are taken as Tuohy needle was advanced to the epidural space. The epidural space was identified, without evidence of blood, cerebrospinal fluid, or parasthesia throughout. Needle tip placement within the epidural space was further confirmed with 1-2 mL of nonionic contrast agent, with the epidural space visualized in the AP and lateral fluoroscopic view(s) with appropriate spread of the agent with fat vacuolization and no intravascular or intrathecal spread noted. Next, 5 mL of a treatment solution containing 2 mL of preservative free 0.25% bupivacaine, 1 mL of Depo-Medrol 40 mg/mL and 2 mL preservative free normal saline was administered slowly. The needle was withdrawn.      Light pressure was held at the puncture site(s) to prevent ecchymosis and oozing.  The patient's skin was cleansed, and hemostasis was confirmed.  Band-aids were applied to the needle injection site(s).      Condition:    The patient remained awake and alert throughout the procedure.  The patient tolerated the procedure well and was monitored for approximately 15 minutes afterward in the post procedure area.  There were no immediate post procedure complications noted.  The patient was then discharged to home as per protocol.      Pre-procedure pain score: 8/10  Post-procedure pain score: 1/10

## 2023-04-07 ENCOUNTER — THERAPY VISIT (OUTPATIENT)
Dept: PHYSICAL THERAPY | Facility: CLINIC | Age: 88
End: 2023-04-07
Attending: ANESTHESIOLOGY
Payer: COMMERCIAL

## 2023-04-07 DIAGNOSIS — M41.25 OTHER IDIOPATHIC SCOLIOSIS, THORACOLUMBAR REGION: ICD-10-CM

## 2023-04-07 DIAGNOSIS — G89.29 CHRONIC RIGHT-SIDED LOW BACK PAIN WITHOUT SCIATICA: ICD-10-CM

## 2023-04-07 DIAGNOSIS — M54.50 CHRONIC RIGHT-SIDED LOW BACK PAIN WITHOUT SCIATICA: ICD-10-CM

## 2023-04-07 PROCEDURE — 97161 PT EVAL LOW COMPLEX 20 MIN: CPT | Mod: GP | Performed by: PHYSICAL THERAPIST

## 2023-04-07 PROCEDURE — 97110 THERAPEUTIC EXERCISES: CPT | Mod: GP | Performed by: PHYSICAL THERAPIST

## 2023-04-07 NOTE — PROGRESS NOTES
[facility]    OUTPATIENT Physical Therapy ORTHOPEDIC EVALUATION  PLAN OF TREATMENT FOR OUTPATIENT REHABILITATION  (COMPLETE FOR INITIAL CLAIMS ONLY)  Patient's Last Name, First Name, M.I.  YOB: 1932  Gene Culverripio RECIO    Provider s Name:  [facility]   Medical Record No.  [id number]   Start of Care Date:  04/07/23   Onset Date:   03/08/23 (MD order)   Treatment Diagnosis:  chronic low back pain with mobility deficits and idiopathic scoliosis Medical Diagnosis:  Other idiopathic scoliosis, thoracolumbar region       Goals:     04/07/23 0500   Body Part   Goals listed below are for Back Pain   Goal #1   Goal #1 ambulation   Previous Functional Level No restrictions   Performance Level WNL, no pain   Current Functional Level Minutes patient can walk   Performance Level 5 mins, pain   STG Target Performance Minutes patient will be able to walk   Performance Level 15 mins, no pain   Rationale for safe household ambulation   Due Date 04/28/23    LTG Target Performance Hours patient will be able to walk   Performance Level 1 hour, no pain   Rationale for safe community ambulation   Due Date 07/06/23         Therapy Frequency:  2-3x/month  Predicted Duration of Therapy Intervention:  10 visits    NICOLE GUSTAFSON, PT                 I CERTIFY THE NEED FOR THESE SERVICES FURNISHED UNDER        THIS PLAN OF TREATMENT AND WHILE UNDER MY CARE     (Physician attestation of this document indicates review and certification of the therapy plan).                     Certification Date From:  04/07/23   Certification Date To:  07/06/23    Referring Provider:  Malu Rojas    Initial Assessment        See Epic Evaluation SOC Date: 04/07/23

## 2023-04-07 NOTE — PROGRESS NOTES
[facility] Physical Therapy Initial Evaluation  4/7/2023     Patient's Name: Ingrid Culver  Referring Provider: Malu Rojas  Visit Diagnosis:   1. Other idiopathic scoliosis, thoracolumbar region      Payor: [employer] / Plan: [employer] MEDICARE ADVANTAGE / Product Type: Medicare /     Precautions/Ailyn/MD instructions: 04/03/23 evaluate and treat    Therapist ASSESSMENT  Ingrid Culver is a 89 year old female patient presenting to Physical Therapy with idiopathic scoliosis and pain of thoracolumbar region.   Key history/exam findings:  1. Idiopathic scoliosis (R) and lumbar hypomobility  Signs and symptoms are consistent with chronic low back pain with mobility deficits and idiopathic scoliosis.   These impairments limit their ability to stand and ambulate.   Skilled PT services are necessary in order to reduce impairments and improve independent function.      SUBJECTIVE   Ingrid Culver is a 89 year old female who presents to outpatient physical therapy for evaluation. Her symptoms consist of:  1. Idiopathic scoliosis and pain of thoracolumbar region    Patient Comments (HPI): She reports that her symptoms began many years ago gradually. Symptoms are located low back R side and around to anterior hip. They are worse when standing and ambulating. She rates pain as 3/10 on average (at worst 7/10, at best 0/10). Overall, she reports her status remains unchanged.    She denies red flags, including Sudden changes in bowel/bladder function, Saddle anaesthesia, Sudden changes in balance, Sudden and/or progressive weakness and Bilateral LE paresthesias.  She reports the following red flags: none.    Aggravating Factors: standing (10-15 mins) and walking (household distances)   Relieving Factors: rest and laying down     Previous Treatments: Injection (thoracolumbar epidural steroid injection with only temporary relief)    General health as reported by patient: good.    PMH/Review of Systems: I briefly  reviewed the review of systems as noted on the health history form.  I am only responding to those symptoms which are directly relevant the specific indication for my consultation. I recommended the patient follow up with their primary care referring provider to pursue any other symptoms which may be of concern.     Surgical history/trauma:  See EMR. She denies any significant current illness or recent hospital admissions. She denies any regional implanted devices.  Imaging:  Results for orders placed or performed in visit on 03/30/23   XR Surgery NYDIA L/T 5 Min Fluoro w Stills    Narrative    This exam was marked as non-reportable because it will not be read by a   radiologist or a [place] non-radiologist provider.           Medications: See EMR    PERTINENT MEDICAL / SURGICAL HISTORY:   Patient Active Problem List   Diagnosis     SHOULDER IMPINGEMENT - left     Hyperlipidemia LDL goal <130     Insomnia     Trigger finger, acquired - right ring     Trigger thumb - right     Bronchiectasis (H)     Cerebral infarction (H)     Vitreous syneresis     PVD (posterior vitreous detachment)     Pseudophakia OU     Esotropia chronic c prism     Dry eyes, bilateral     Other idiopathic scoliosis, thoracolumbar region     Ganglion cyst of finger of right hand     Hypertension goal BP (blood pressure) < 150/90     Hypothyroidism due to acquired atrophy of thyroid     Coronary artery disease involving native coronary artery of native heart with angina pectoris (H)     Peripheral arterial disease (H)     COPD (chronic obstructive pulmonary disease) (H)     Age-related osteoporosis without current pathological fracture     Impingement syndrome of right shoulder     Stage 3a chronic kidney disease (H)     Past Surgical History:   Procedure Laterality Date     APPENDECTOMY       BREAST BIOPSY, RT/LT      Breat Biopsy RT/LT     CARDIAC SURGERY  12/03/2017    STINT     CATARACT IOL, RT/LT       COLONOSCOPY  06/00   And 2008     diverticulosis     COLONOSCOPY  01/13/2012     ENDOSCOPY  01/13/2012     EXTRACAPSULAR CATARACT EXTRATION WITH INTRAOCULAR LENS IMPLANT  01/2000    both eyes     FLEXIBLE SIGMOIDOSCOPY  06/95,08/98     INJECT EPIDURAL LUMBAR N/A 3/30/2023    Procedure: Thoracolumbar Epidural Steroid Injection;  Surgeon: Malu Rojas MD;  Location: [facility] OR     JOINT REPLACEMTN, KNEE RT/LT  1992    right     YAG CAPSULOTOMY OD (RIGHT EYE) Right 01/31/2023    Dr. Rodriguez     YAG CAPSULOTOMY OS (LEFT EYE) Left 02/07/2023    Dr. Rodriguez     [facility] TOTAL KNEE ARTHROPLASTY  06/25/2003    left poly exchange       Occupation: Retired   Social history/Living Environment: Lives with her son   Current exercise regimen/Recreational activities: Sedentary, enjoys walking outside in the summer  Possible barriers impacting outcomes: none    Patient Self-identified Goal: Ingrid Culver would like to decrease pain so that she can negotiate flight of stairs.      OBJECTIVE  Observation: scoliosis right  Gait: normal, no asymmetries or obvious deviations  Transfers: normal, no assist required  Screening (regional interdependence): Hip screen negative (ROM, overpressure, SL stance, COREEN) - noncontributory  Knee screen negative (ROM, overpressure, heel/toe walk, SL squat) - noncontributory  Range of Motion: affected side: right > left  Lumbar AROM full and painFREE  Neuro Screen:    Myotomes: L3-S1 intact  Strength: hip abductors, adductors, internal rotators, external rotators 5/5  Functional movement: Squat: to parallel with BUE support  Segmental Assessment: HYPOmobile to central posterior to anterior springing at lumbar spine      ASSESSMENT/PLAN  Patient is a 89 year old female with lumbar complaints.    Patient has the following significant findings with corresponding treatment plan.                Diagnosis 1:    chronic low back pain with mobility deficits and idiopathic scoliosis  Pain -  hot/cold therapy, manual therapy and  education  Decreased ROM/flexibility - manual therapy and therapeutic exercise  Decreased joint mobility - manual therapy and therapeutic exercise  Decreased strength - therapeutic exercise and therapeutic activities  Impaired muscle performance - neuro re-education  Decreased function - therapeutic activities  Impaired posture - neuro re-education    Therapy Evaluation Codes:   Cumulative Therapy Evaluation is: Low complexity.   Previous and current functional limitations:  (See Goal Flow Sheet for this information)    Short term and Long term goals: (See Goal Flow Sheet for this information)     Communication ability:  Patient appears to be able to clearly communicate and understand verbal and written communication and follow directions correctly.    Treatment Explanation - The following has been discussed with the patient:   RX ordered/plan of care  Anticipated outcomes  Possible risks and side effects    This patient would benefit from PT intervention to resume normal activities.   Rehab potential is good.    Frequency:  2-3 X a month, once daily  Duration:  for 10 visits  Discharge Plan:  Achieve all LTG.  Independent in home treatment program.  Reach maximal therapeutic benefit.    Please refer to the daily flowsheet for treatment today, total treatment time and time spent performing 1:1 timed codes.     *Text entry may be via Dragon Dictation software in real-time. Efforts are made to edit for clarity.     Bety Gibbons, PT, DPT, OCS  [facility], [place]

## 2023-04-13 ENCOUNTER — THERAPY VISIT (OUTPATIENT)
Dept: PHYSICAL THERAPY | Facility: CLINIC | Age: 88
End: 2023-04-13
Attending: ANESTHESIOLOGY
Payer: COMMERCIAL

## 2023-04-13 DIAGNOSIS — G89.29 CHRONIC RIGHT-SIDED LOW BACK PAIN WITHOUT SCIATICA: ICD-10-CM

## 2023-04-13 DIAGNOSIS — M54.50 CHRONIC RIGHT-SIDED LOW BACK PAIN WITHOUT SCIATICA: ICD-10-CM

## 2023-04-13 DIAGNOSIS — M41.25 OTHER IDIOPATHIC SCOLIOSIS, THORACOLUMBAR REGION: Primary | ICD-10-CM

## 2023-04-13 PROCEDURE — 97110 THERAPEUTIC EXERCISES: CPT | Mod: GP | Performed by: PHYSICAL THERAPIST

## 2023-05-30 ENCOUNTER — TELEPHONE (OUTPATIENT)
Dept: PALLIATIVE MEDICINE | Facility: CLINIC | Age: 88
End: 2023-05-30

## 2023-05-30 NOTE — TELEPHONE ENCOUNTER
[facility] Call Center    Phone Message    May a detailed message be left on voicemail: yes     Reason for Call: Other: Pt on the line with her daughter. Daughter requesting clarification from  about whether or not her mom can get another injection like the one she received 3/30. Please call back daughter to advise on scheduling next procedure.      Action Taken: Message routed to:  [facility] ([facility]    Travel Screening: Not Applicable

## 2023-05-31 ENCOUNTER — TELEPHONE (OUTPATIENT)
Dept: ANESTHESIOLOGY | Facility: CLINIC | Age: 88
End: 2023-05-31
Payer: COMMERCIAL

## 2023-05-31 NOTE — TELEPHONE ENCOUNTER
Called daughter and set next appointment for 7/12 at 10:30 If need be Daughter oscar can call back and reschedule if something comes up in her schedule at [phone]

## 2023-07-12 ENCOUNTER — OFFICE VISIT (OUTPATIENT)
Dept: ANESTHESIOLOGY | Facility: CLINIC | Age: 88
End: 2023-07-12
Payer: COMMERCIAL

## 2023-07-12 ENCOUNTER — TELEPHONE (OUTPATIENT)
Dept: PALLIATIVE MEDICINE | Facility: CLINIC | Age: 88
End: 2023-07-12

## 2023-07-12 VITALS
OXYGEN SATURATION: 95 % | BODY MASS INDEX: 22.97 KG/M2 | WEIGHT: 117 LBS | HEIGHT: 60 IN | DIASTOLIC BLOOD PRESSURE: 58 MMHG | HEART RATE: 62 BPM | SYSTOLIC BLOOD PRESSURE: 103 MMHG

## 2023-07-12 DIAGNOSIS — M54.16 LUMBAR RADICULOPATHY: Primary | ICD-10-CM

## 2023-07-12 DIAGNOSIS — M41.25 OTHER IDIOPATHIC SCOLIOSIS, THORACOLUMBAR REGION: ICD-10-CM

## 2023-07-12 PROCEDURE — 99214 OFFICE O/P EST MOD 30 MIN: CPT | Performed by: ANESTHESIOLOGY

## 2023-07-12 ASSESSMENT — PAIN SCALES - GENERAL: PAINLEVEL: SEVERE PAIN (7)

## 2023-07-12 NOTE — TELEPHONE ENCOUNTER
Patient is scheduled for surgery with Dr. Rojas    Spoke with: patient    Date of Surgery: 08/18/23    Location: [facility] [place]    Informed patient they will need an adult  yes    Additional comments: patient is aware of date and time of the procedure.        Lani Ordaz MA on 7/12/2023 at 11:42 AM

## 2023-07-12 NOTE — TELEPHONE ENCOUNTER
RN reviewed patient chart. Pre procedure instructions were reviewed with patient.    Consuelo Esposito RNCC

## 2023-07-12 NOTE — NURSING NOTE
Patient presents with:  Follow Up: Follow-up Post-op Injection Lower back pain      Severe Pain (7)     Pain Medications     Analgesics Other Refills Start End     Acetaminophen (TYLENOL PO)          Sig - Route: Take 500 mg by mouth 2 times daily - Oral    Class: Historical    Salicylates Refills Start End     aspirin 81 MG tablet          Sig - Route: Take by mouth daily - Oral    Class: Historical          What medications are you using for pain? Tylenol    (New patients only) Have you been seen by another pain clinic/ provider? no    (Return Patients only) What refills are you needing today? no

## 2023-07-12 NOTE — LETTER
7/12/2023       RE: Ingrid Culver  [address]  [facility] [facility] [address]     Dear Colleague,    Thank you for referring your patient, Ingrid Culver, to the [facility] [place] at [facility]. Please see a copy of my visit note below.    [facility]    Date of visit: 7/12/2023    Chief complaint:   Chief Complaint   Patient presents with    Follow Up     Follow-up Post-op Injection Lower back pain       Interval history:  Ingrid Culver was last seen by me on 3/8/2023.      Recommendations/plan at the last visit included:  Physical Therapy: referred   TENS Unit: ordered  Pain Psychologist to address issues of relaxation, behavioral change, coping style, and other factors important to improvement: N  Diagnostic Studies: N  Medication Management: N  Further procedures recommended: thoracolumbar epidural injection  Recommendations/follow-up for PCP:  N  Follow up: thoracolumbar epidural injection, and f/u 6 weeks after the procedure    Since her last visit, Ingrid Culver reports:    T12 - L1 VIVIENNE on 3/30/23  Worked well but the pain feels slightly in different location  Technically challenging VIVIENNE, especially with worsening scoliosis. Discussed alternate approach, including Caudal.     Pain scores:  Pain intensity on average is 7 on a scale of 0-10.       Medications:  Current Outpatient Medications   Medication Sig Dispense Refill    Acetaminophen (TYLENOL PO) Take 500 mg by mouth 2 times daily      aspirin 81 MG tablet Take by mouth daily      azithromycin (ZITHROMAX) 250 MG tablet 1 tablet every M,W,F      Docusate Sodium (COLACE PO) Take by mouth 2 times daily      ipratropium - albuterol 0.5 mg/2.5 mg/3 mL (DUONEB) 0.5-2.5 (3) MG/3ML neb solution Take 1 vial by nebulization every 6 hours as needed for shortness of breath / dyspnea or wheezing      isosorbide dinitrate (ISORDIL)  "30 MG tablet Take 30 mg by mouth daily      LANsoprazole (PREVACID) 15 MG DR capsule Take 15 mg by mouth      levothyroxine (SYNTHROID/LEVOTHROID) 25 MCG tablet Take 1 tablet (25 mcg) by mouth daily 90 tablet 3    metoprolol tartrate (LOPRESSOR) 50 MG tablet Take 0.5 tablets (25 mg) by mouth 2 times daily Take one tablet (50 mg) each morning, and take 1/2 tablet (25 mg) at night (Patient taking differently: Take 25 mg by mouth 2 times daily 1/2 a pill BID) 90 tablet 3    Nitroglycerin (NITROTAB SL) Place 0.4 mg under the tongue      polyethylene glycol (MIRALAX/GLYCOLAX) Packet Take 1 packet by mouth daily      psyllium (METAMUCIL) WAFR Take 2 Wafers by mouth daily      rosuvastatin (CRESTOR) 10 MG tablet Take 1 tablet (10 mg) by mouth daily 90 tablet 3       Medical History: any changes in medical history since they were last seen? No    Review of Systems:  The 14 system ROS was reviewed from the intake questionnaire, and is positive for: back pain  Any bowel or bladder problems: denies  Mood: stable    Physical Exam:  Blood pressure 103/58, pulse 62, height 1.511 m (4' 11.5\"), weight 53.1 kg (117 lb), SpO2 95 %.  General: AAOx3, NAD  Gait: Antalgic  MSK exam: deferred for conversation    Assessment:   Lumbar Radiculopathy  Scoliosis    Ingrid Culver is a 89 year old female who is seen at the pain clinic for follow up to discuss next steps in her treatment.  She did benefit from her VIVIENNE. We discussed a trial of a caudal VIVIENNE to see if this may target her pain at a better level.     Plan:  Physical Therapy:  Continue daily HEPs  Clinical Health Psychologist to address issues of relaxation, behavioral change, coping style, and other factors important to improvement.  Not at this tiem  Diagnostic Studies:  none  Medication Management:  none  Further procedures recommended: Caudal VIVIENNE  Recommendations to PCP: none  Follow up: 6 weeks after procedure          Again, thank you for allowing me to participate in the " care of your patient.      Sincerely,    Malu Rojas MD

## 2023-07-12 NOTE — PROGRESS NOTES
[facility]    Date of visit: 7/12/2023    Chief complaint:   Chief Complaint   Patient presents with     Follow Up     Follow-up Post-op Injection Lower back pain       Interval history:  Ingrid Culver was last seen by me on 3/8/2023.      Recommendations/plan at the last visit included:  1. Physical Therapy: referred   2. TENS Unit: ordered  3. Pain Psychologist to address issues of relaxation, behavioral change, coping style, and other factors important to improvement: N  4. Diagnostic Studies: N  5. Medication Management: N  6. Further procedures recommended: thoracolumbar epidural injection  7. Recommendations/follow-up for PCP:  N  8. Follow up: thoracolumbar epidural injection, and f/u 6 weeks after the procedure    Since her last visit, Ingrid Culver reports:    T12 - L1 VIVIENNE on 3/30/23  Worked well but the pain feels slightly in different location  Technically challenging VIVIENNE, especially with worsening scoliosis. Discussed alternate approach, including Caudal.     Pain scores:  Pain intensity on average is 7 on a scale of 0-10.       Medications:  Current Outpatient Medications   Medication Sig Dispense Refill     Acetaminophen (TYLENOL PO) Take 500 mg by mouth 2 times daily       aspirin 81 MG tablet Take by mouth daily       azithromycin (ZITHROMAX) 250 MG tablet 1 tablet every M,W,F       Docusate Sodium (COLACE PO) Take by mouth 2 times daily       ipratropium - albuterol 0.5 mg/2.5 mg/3 mL (DUONEB) 0.5-2.5 (3) MG/3ML neb solution Take 1 vial by nebulization every 6 hours as needed for shortness of breath / dyspnea or wheezing       isosorbide dinitrate (ISORDIL) 30 MG tablet Take 30 mg by mouth daily       LANsoprazole (PREVACID) 15 MG DR capsule Take 15 mg by mouth       levothyroxine (SYNTHROID/LEVOTHROID) 25 MCG tablet Take 1 tablet (25 mcg) by mouth daily 90 tablet 3     metoprolol tartrate (LOPRESSOR) 50 MG tablet Take 0.5 tablets (25 mg) by mouth 2 times daily Take one  "tablet (50 mg) each morning, and take 1/2 tablet (25 mg) at night (Patient taking differently: Take 25 mg by mouth 2 times daily 1/2 a pill BID) 90 tablet 3     Nitroglycerin (NITROTAB SL) Place 0.4 mg under the tongue       polyethylene glycol (MIRALAX/GLYCOLAX) Packet Take 1 packet by mouth daily       psyllium (METAMUCIL) WAFR Take 2 Wafers by mouth daily       rosuvastatin (CRESTOR) 10 MG tablet Take 1 tablet (10 mg) by mouth daily 90 tablet 3       Medical History: any changes in medical history since they were last seen? No    Review of Systems:  The 14 system ROS was reviewed from the intake questionnaire, and is positive for: back pain  Any bowel or bladder problems: denies  Mood: stable    Physical Exam:  Blood pressure 103/58, pulse 62, height 1.511 m (4' 11.5\"), weight 53.1 kg (117 lb), SpO2 95 %.  General: AAOx3, NAD  Gait: Antalgic  MSK exam: deferred for conversation    Assessment:   Lumbar Radiculopathy  Scoliosis    Ingrid Culver is a 89 year old female who is seen at the pain clinic for follow up to discuss next steps in her treatment.  She did benefit from her VIVIENNE. We discussed a trial of a caudal VIVIENNE to see if this may target her pain at a better level.     Plan:  1. Physical Therapy:  Continue daily HEPs  2. Clinical Health Psychologist to address issues of relaxation, behavioral change, coping style, and other factors important to improvement.  Not at this tiem  3. Diagnostic Studies:  none  4. Medication Management:  none  5. Further procedures recommended: Caudal VIVIENNE  6. Recommendations to PCP: none  7. Follow up: 6 weeks after procedure    Malu Rojas MD    Pain Medicine  Department of Anesthesiology  [facility]            "

## 2023-07-12 NOTE — PATIENT INSTRUCTIONS
Procedures:    Call to schedule your procedure: [phone] option #2  Caudal Epidural Injection    Your pre-procedure instructions are below, please call our clinic if you have any questions.      Recommended Follow up:      Follow up 4 weeks after injection.       To speak with a nurse, schedule/reschedule/cancel a clinic appointment, or request a medication refill call: [phone].    You can also reach us by [place]: [email or web address]      Procedure Information related to COVID-19     Please call [phone] option #2 to schedule, reschedule, or cancel your procedure appointment.   Phones are answered Monday - Friday from 08:00 - 4:30pm.  Leave a voicemail with your name, birth date, and phone number if no one is available to take your call.        You no longer need to test for COVID- 19 prior to your procedure/surgery, unless your physician specifically requests that you test. If you experience COVID symptoms or have tested positive for COVID-19 within 14 days of your scheduled surgery or procedure, please update our office right away and your procedure may have to be postponed.       The procedure center staff will call you several days before the procedure to review important information that you will need to know for the day of the procedure.     Please contact the clinic if you have further questions about this information [phone].        Information related to Scheduling and Pre-Procedure Instructions:    If you must reschedule your procedure more than two times, you must follow up in clinic before rescheduling again.    Preparing for your procedure    CAUTION - FAILURE TO FOLLOW THESE PRE-PROCEDURE INSTRUCTIONS WILL RESULT IN YOUR PROCEDURE BEING RESCHEDULED.    Your Procedure: Caudal Epidural Injection        You must have a  take you home after your procedure. Transportation by taxi or para-transit is okay as long as you have a responsible adult accompany you.  You must provide your 's full name and contact number at time of check in.     Fasting Protocol Please have nothing to eat or drink 1 hour prior to arrival.     Medications If you take any medications, DO NOT STOP. Take your medications as usual the day of your procedure with a sip of water AT LEAST 2 HOURS PRIOR TO ARRIVAL.    Antibiotics If you are currently taking antibiotics, you must complete the entire dose 7 days prior to your scheduled procedure. You must be clear of any signs or symptoms of infection. If you begin antibiotics, please contact our clinic for instructions.     Fever, Chills, or Rash If you experience a fever of higher than 100 degrees, chills, rash, or open wounds during the one week before your procedure, please call the clinic to see if you may proceed with your procedure.      Medication Hold List  **Patients under Cardiology/Neurology care should consult their provider prior to the pain procedure to verify pre-procedure medication instructions. The information below contains general guidelines.**        Blood Thinners If you are taking daily ASPIRIN, PLAVIX, OR OTHER BLOOD THINNERS SUCH AS COUMADIN/WARFARIN, we will need your prescribing doctor to sign a release permitting you to stop these medications. Once approved by your prescribing doctor - STOP ALL BLOOD THINNERS BASED ON THE TIME TABLE BELOW PRIOR TO YOUR PROCEDURE. If you have been instructed to stop WARFARIN(COUMADIN), you must have an INR lab drawn the day before your procedure. . Your INR must be within normal limits before we can perform your injection. MEDICATIONS CAN BE RESTARTED AFTER YOUR PROCEDURE.    14 DAY HOLD  Ticlid (ticlopidine)    10 DAY HOLD  Effient (Prasugel)    3 DAY HOLD  Xarelto (rivaroxaban) 7 DAY HOLD  Anacin, Bufferin, Ecotrin, Excedrin, Aggrenox (Aspirin)  Brilinta (ticagrelor)  Coumadin (Warfarin)  Pradexa (Dabigatran)  Elmiron (Pentosan)  Plavix (Clopidogrel Bisulfate)  Pletal (Cilostazol)    24  HOUR HOLD  Lovenox (enoxaparin)  Agrylin (Anagrelide)        Non-steroidal Anti-inflammatories (NSAIDs) DO NOT TAKE any non-steroidal anti-inflammatory medications (NSAIDs) listed on the table below. MEDICATIONS CAN BE RESTARTED AFTER YOUR PROCEDURE. Celebrex is OK to take and does not need to be discontinued.     Medications to stop:  3 DAY HOLD  Advil, Motrin (Ibuprofen)  Arthrotec (diciofenac sodium/misoprostol)  Clinoril (Sulindac)  Indocin (Indomethacin)  Lodine (Etodolac)  Toradol (Ketorolac)  Vicoprofen (Hydrocodone and Ibuprofen)  Voltaren (Diclofenac)  Apixaban (Eliquis)    14 DAY HOLD  Daypro (Oxaprozin)  Feldene (Piroxicam)   7 DAY HOLD  Aleve (Naproxen sodium)  Darvon compound (contains aspirin)  Naprosyn (Naproxen)  Norgesic Forte (contains aspirin)  Mobic (Meloxicam)  Oruvall (Ketoprofen)  Percodan (contains aspirin)  Relafen (Nabumetone)  Salsalate  Trilisate  Vitamin E (more than 400 mg per day)  Any medication containing aspirin                To speak with a nurse, schedule/reschedule/cancel a clinic appointment, or request a medication refill call: [phone]    You can also reach us by [place] [employer]: [email or web address]

## 2023-08-18 ENCOUNTER — ANCILLARY PROCEDURE (OUTPATIENT)
Dept: RADIOLOGY | Facility: AMBULATORY SURGERY CENTER | Age: 88
End: 2023-08-18
Attending: ANESTHESIOLOGY
Payer: COMMERCIAL

## 2023-08-18 ENCOUNTER — HOSPITAL ENCOUNTER (OUTPATIENT)
Facility: AMBULATORY SURGERY CENTER | Age: 88
Discharge: HOME | End: 2023-08-18
Attending: ANESTHESIOLOGY | Admitting: ANESTHESIOLOGY
Payer: COMMERCIAL

## 2023-08-18 VITALS
SYSTOLIC BLOOD PRESSURE: 153 MMHG | WEIGHT: 117 LBS | HEIGHT: 59 IN | RESPIRATION RATE: 16 BRPM | TEMPERATURE: 97.6 F | OXYGEN SATURATION: 99 % | DIASTOLIC BLOOD PRESSURE: 86 MMHG | BODY MASS INDEX: 23.59 KG/M2 | HEART RATE: 76 BPM

## 2023-08-18 DIAGNOSIS — M41.25 OTHER IDIOPATHIC SCOLIOSIS, THORACOLUMBAR REGION: ICD-10-CM

## 2023-08-18 PROCEDURE — 62323 NJX INTERLAMINAR LMBR/SAC: CPT

## 2023-08-18 RX ORDER — IOPAMIDOL 408 MG/ML
INJECTION, SOLUTION INTRATHECAL DAILY PRN
Status: DISCONTINUED | OUTPATIENT
Start: 2023-08-18 | End: 2023-08-18 | Stop reason: HOSPADM

## 2023-08-18 RX ORDER — BUPIVACAINE HYDROCHLORIDE 2.5 MG/ML
INJECTION, SOLUTION EPIDURAL; INFILTRATION; INTRACAUDAL DAILY PRN
Status: DISCONTINUED | OUTPATIENT
Start: 2023-08-18 | End: 2023-08-18 | Stop reason: HOSPADM

## 2023-08-18 RX ORDER — LIDOCAINE HYDROCHLORIDE 10 MG/ML
INJECTION, SOLUTION EPIDURAL; INFILTRATION; INTRACAUDAL; PERINEURAL DAILY PRN
Status: DISCONTINUED | OUTPATIENT
Start: 2023-08-18 | End: 2023-08-18 | Stop reason: HOSPADM

## 2023-08-18 RX ORDER — METHYLPREDNISOLONE ACETATE 40 MG/ML
INJECTION, SUSPENSION INTRA-ARTICULAR; INTRALESIONAL; INTRAMUSCULAR; SOFT TISSUE DAILY PRN
Status: DISCONTINUED | OUTPATIENT
Start: 2023-08-18 | End: 2023-08-18 | Stop reason: HOSPADM

## 2023-08-18 NOTE — DISCHARGE INSTRUCTIONS
Caudal Epidural Pain Injection  This procedure can be used to treat a variety of conditions that result in lower back and lower extremity pain.    Activity  -You may resume most normal activity levels with the exception of strenuous activity. It is important for us to know if your pain with normal activity is relieved after this injection.  -DO NOT shower for 24 hours  -DO NOT remove bandaid for 24 hours    Pain  -You may experience soreness at the injection site for one or two days  -You may use an ice pack for 20 minutes every 2 hours for the first 24 hours  -You may use a heating pad after the first 24 hours  -You may use Tylenol (acetaminophen) every 4 hours or other pain medicines as     directed by your physician    You may experience numbness radiating into your legs or arms (depending on the procedure location). This numbness may last several hours. Until sensation returns to normal; please use caution in walking, climbing stairs, and stepping out of your vehicle, etc.    DID YOU RECEIVE STEROIDS TODAY?  Yes    Common side effects of steroids:  Not everyone will experience corticosteroid side effects. If side effects are experienced, they will gradually subside in the 7-10 day period following an injection. Most common side effects include:  -Flushed face and/or chest  -Feeling of warmth, particularly in the face but could be an overall feeling of warmth  -Increased blood sugar in diabetic patients  -Menstrual irregularities my occur. If taking hormone-based birth control an alternate method of birth control is recommended  -Sleep disturbances and/or mood swings are possible  -Leg cramps      PLEASE KEEP TRACK OF YOUR SYMPTOMS AND NOTE YOUR IMPROVEMENT FOR YOUR DOCTOR.     Please contact us if you have:  -Severe pain  -Fever more than 101.5 degrees Fahrenheit  -Signs of infection at the injection site (redness, swelling, or drainage)    FOR PAIN CENTER PATIENTS:  If you have questions, please contact the  Pain Clinic at [phone] Option #1 between the hours of 7:00 am and 3:00 pm Monday through Friday. After office hours you can contact the on call provider by dialing [phone]. If you need immediate attention, we recommend that you go to a hospital emergency room or dial [phone].      FOR PM&R PATIENTS:  For patients seen by the PM and R service, please call [phone]. (Monday through Friday 8a-5p.  After business hours and weekends call [phone] and ask for the PM and R doctor on call). If you need to fax a pain diary to PM&R the fax number is [phone]. If you are unable to fax, uploading to [employer] is encouraged, then send to provider. If you have procedure scheduling questions please call [phone] Option #2.

## 2023-08-24 NOTE — OP NOTE
CUADAL EPIDURAL STEROID INJECTION    PROCEDURE:  1) Caudal epidural steroid injection  2) Fluoroscopic needle guidance    REASON FOR PROCEDURE:Chronic low back pain secondary to lumbar radiculopathy and scoliosis    PHYSICIAN: Malu Rojas MD  ASSISTANT: Rene Hanson MD, Resident    MEDICATIONS INJECTED: A 8 mL solution containing 40 mg depomedrol, 3mL 0.25% bupivacaine and 4mL preservative free normal saline.  LOCAL ANESTHETIC INJECTED: 1 mL of 1% lidocaine per site  CONTRAST: Isovue, 2mL used.    SEDATION MEDICATIONS: None  ESTIMATED BLOOD LOSS: None  COMPLICATIONS: None      Pre-procedure pain score: 10/10  Post-procedure pain score: 1/10    TECHNIQUE: Time-out was taken to identify the correct patient, procedure and side prior to starting the procedure. Lying in the prone position, the patient was prepped and draped in sterile fashion using chloroprep and sterile towels  Pressure points were checked and padded for patient comfort.  Appropriate landmarks were determined using a lateral fluoroscopic image.  Local anesthetic was given by raising a wheal and going down to the hub of a 25-gauge 1.25-inch needle. A 22-gauge, 3.5-inch Quincke needle was introduced through the sacral hiatus. The needle was advanced cephalad to just caudal to the inferior sacroiliac joint line. Isovue was injected to confirm placement in the appropriate epidural space, and to show that there was no run-off. The medication was then injected slowly. The needle was withdrawn after flushing with local anesthetic.    The procedure was completed without complications and was tolerated well. The patient was monitored after the procedure.  The patient (or responsible party) was given post-procedure and discharge instructions to follow at home. The patient was  discharged in stable condition. A follow up appointment was made.    Malu Rojas MD    [facility]  Pain Management  Department of Anesthesiology

## 2023-08-24 NOTE — H&P
ABBREVIATED H&P [facility]      Patient Name: Ingrid Culver   MRN: [id number]   YOB: 1932     1. Reason for Procedure:  Procedure Summary       Date: 08/18/23 Room / Location: [facility] PROCEDURE ROOM 06 / [facility]    Anesthesia Start:  Anesthesia Stop:     Procedure: Caudal Epidural Steroid Injection (Spine) Diagnosis:       Lumbar radiculopathy      Other idiopathic scoliosis, thoracolumbar region      (Lumbar radiculopathy [M54.16])      (Other idiopathic scoliosis, thoracolumbar region [M41.25])    Providers: Malu Rojas MD Responsible Provider:     Anesthesia Type: Not recorded ASA Status: Not recorded            2. History:   Past Medical History:   Diagnosis Date    Allergies     idiopathic, anaphyllactic rx's    Bronchiectasis     cough on inhalers recurrent infections    Cataract     Cerebral infarction (H)     COPD (chronic obstructive pulmonary disease) (H)     Disorders of lipoid metabolism     hx of on low fat diet    Esotropia chronic c prism 11/6/2013    Heart disease     Hypertension goal BP (blood pressure) < 150/90     Impingement syndrome of right shoulder 11/28/2022    Knee pain     Other bone/cartilage disease     Post-Nasal Drainage     Unspecified asthma(493.90)     Asthma    Unspecified hypothyroidism     Uterine fibroid     10/08 U/s shows 4X2.9X3/3       Comorbidities: None    Any history of sleep apnea? No    Any history of problems with sedation? No    3. Physical:    General: Normal  Skin:  Normal.  Respiratory: Clear to auscultation bilateral, no wheezing  Cardio:  Regular rate and rhythm  Abdomen: Soft, nontender, nondistended, no palpable masses.  Musculoskeletal: Normal  Neuro: Sensory exam normal, motor exam 5/5, bilateral upper and lower extremities       4. Current Medications (if not in Epic):   Current Outpatient Medications   Medication Sig Dispense Refill    Acetaminophen (TYLENOL  PO) Take 500 mg by mouth 2 times daily      aspirin 81 MG tablet Take by mouth daily      azithromycin (ZITHROMAX) 250 MG tablet 1 tablet every M,W,F      Docusate Sodium (COLACE PO) Take by mouth 2 times daily      ipratropium - albuterol 0.5 mg/2.5 mg/3 mL (DUONEB) 0.5-2.5 (3) MG/3ML neb solution Take 1 vial by nebulization every 6 hours as needed for shortness of breath / dyspnea or wheezing      isosorbide dinitrate (ISORDIL) 30 MG tablet Take 30 mg by mouth daily      LANsoprazole (PREVACID) 15 MG DR capsule Take 15 mg by mouth      levothyroxine (SYNTHROID/LEVOTHROID) 25 MCG tablet Take 1 tablet (25 mcg) by mouth daily 90 tablet 3    metoprolol tartrate (LOPRESSOR) 50 MG tablet Take 0.5 tablets (25 mg) by mouth 2 times daily Take one tablet (50 mg) each morning, and take 1/2 tablet (25 mg) at night (Patient taking differently: Take 25 mg by mouth 2 times daily 1/2 a pill BID) 90 tablet 3    Nitroglycerin (NITROTAB SL) Place 0.4 mg under the tongue      polyethylene glycol (MIRALAX/GLYCOLAX) Packet Take 1 packet by mouth daily      psyllium (METAMUCIL) WAFR Take 2 Wafers by mouth daily      rosuvastatin (CRESTOR) 10 MG tablet Take 1 tablet (10 mg) by mouth daily 90 tablet 3        5. Allergies and Reactions:  is allergic to zocor [statins].

## 2023-08-25 PROBLEM — G89.29 CHRONIC RIGHT-SIDED LOW BACK PAIN: Status: RESOLVED | Noted: 2023-04-07 | Resolved: 2023-08-25

## 2023-08-25 PROBLEM — M54.50 CHRONIC RIGHT-SIDED LOW BACK PAIN: Status: RESOLVED | Noted: 2023-04-07 | Resolved: 2023-08-25

## 2023-08-25 NOTE — PROGRESS NOTES
04/13/23 0500   Appointment Info   Total/Authorized Visits 10   Other pertinent information NEXT: Vitals, HEP, Nustep, LE and core strength   Progress Note/Certification   Therapy Frequency 2-3x/month   Therapeutic Procedure/Exercise   PTRx Ther Proc 1 Supine Lumbar Hip Roll   PTRx Ther Proc 1 - Details x10   PTRx Ther Proc 2 Double Knee to Chest   PTRx Ther Proc 2 - Details x10 with 10 sec hold   PTRx Ther Proc 3 Seated Hip Flexion With Theraband   PTRx Ther Proc 3 - Details x10 no theraband - Progressed to Standing   PTRx Ther Proc 4 Sit to Stand   PTRx Ther Proc 4 - Details 2x10 no UE support   PTRx Ther Proc 5 Standing Hip Flexion   PTRx Ther Proc 5 - Details x10 rail support         DISCHARGE  Reason for Discharge: No further expectation of progress.    Equipment Issued: none    Discharge Plan: Patient to continue home program.    Referring Provider:  Malu Rojas

## 2023-09-18 ENCOUNTER — TELEPHONE (OUTPATIENT)
Dept: PALLIATIVE MEDICINE | Facility: CLINIC | Age: 88
End: 2023-09-18
Payer: COMMERCIAL

## 2023-09-18 NOTE — TELEPHONE ENCOUNTER
Health Call Center    Phone Message    May a detailed message be left on voicemail: yes     Reason for Call: Medication Refill Request    Has the patient contacted the pharmacy for the refill? Yes   Name of medication being requested: None yet- Pt daughter called to see about getting some type of pain medication. She stated that the Pt did not want it before but that her pain has gotten so bad that she can barely walk so she would like to try some medication now. Daughter stated she cannot take oxycodone because it makes her sick.   Provider who prescribed the medication: Malu Rojas  Pharmacy: [facility]  [address], [place] [address]    Date medication is needed: As soon as possible  Nothing has been prescribed yet, would be the first time. Please call back to advise.     Action Taken: Message routed to:  Cl[facility] ([facility]): [facility]    Travel Screening: Not Applicable

## 2023-09-19 NOTE — TELEPHONE ENCOUNTER
RN called patient's daughter Loni back. Phone call answered and call ended. Writer attempted call to patient's main number and left voicemail informing our providers do not prescribe medications outside of a clinic appointment. Writer advised for patient to schedule a follow up appointment to discuss. Left call back number.    Consuelo Esposito RNCC

## 2023-09-22 ENCOUNTER — TELEPHONE (OUTPATIENT)
Dept: FAMILY MEDICINE | Facility: CLINIC | Age: 88
End: 2023-09-22
Payer: COMMERCIAL

## 2023-09-22 ENCOUNTER — TELEPHONE (OUTPATIENT)
Dept: NURSING | Facility: CLINIC | Age: 88
End: 2023-09-22
Payer: COMMERCIAL

## 2023-09-22 NOTE — TELEPHONE ENCOUNTER
RN COVID HUB,    Patient and patient daughter called requesting treatment. COVID positive home test. Denying shortness of breath, denying chest pain.     Thanks,  TYLER Woods  [facility]

## 2023-09-22 NOTE — TELEPHONE ENCOUNTER
Pt's daughter, Laura, calling wondering about COVID tx for her mother. Pt tested positive for COVID today after symptoms developed today. Explained the process of getting antiviral tx. Daughter did not want to wait so is planning on taking pt to . No CTC on file so generic information was provided only.    Gina Londono RN  [facility] Nurse Advisor   9/22/2023  5:18 PM

## 2023-09-25 NOTE — CONFIDENTIAL NOTE
Called pt 3x - no answer.   Left VM to call back if still interested in Paxlovid.    Mickie Spicer RN  [facility]

## 2023-10-02 ENCOUNTER — E-VISIT (OUTPATIENT)
Dept: FAMILY MEDICINE | Facility: CLINIC | Age: 88
End: 2023-10-02
Payer: COMMERCIAL

## 2023-10-02 DIAGNOSIS — R11.0 NAUSEA: Primary | ICD-10-CM

## 2023-10-02 PROCEDURE — 99421 OL DIG E/M SVC 5-10 MIN: CPT | Performed by: FAMILY MEDICINE

## 2023-10-02 RX ORDER — ONDANSETRON 4 MG/1
4 TABLET, FILM COATED ORAL EVERY 8 HOURS PRN
Qty: 20 TABLET | Refills: 1 | Status: SHIPPED | OUTPATIENT
Start: 2023-10-02 | End: 2023-12-07

## 2023-10-11 ENCOUNTER — OFFICE VISIT (OUTPATIENT)
Dept: ANESTHESIOLOGY | Facility: CLINIC | Age: 88
End: 2023-10-11
Payer: COMMERCIAL

## 2023-10-11 VITALS
WEIGHT: 117 LBS | HEIGHT: 59 IN | HEART RATE: 63 BPM | BODY MASS INDEX: 23.59 KG/M2 | OXYGEN SATURATION: 96 % | SYSTOLIC BLOOD PRESSURE: 127 MMHG | DIASTOLIC BLOOD PRESSURE: 64 MMHG

## 2023-10-11 DIAGNOSIS — M79.18 MYOFASCIAL PAIN: ICD-10-CM

## 2023-10-11 DIAGNOSIS — M41.25 OTHER IDIOPATHIC SCOLIOSIS, THORACOLUMBAR REGION: ICD-10-CM

## 2023-10-11 DIAGNOSIS — M54.16 LUMBAR RADICULOPATHY: Primary | ICD-10-CM

## 2023-10-11 PROCEDURE — 99213 OFFICE O/P EST LOW 20 MIN: CPT | Performed by: ANESTHESIOLOGY

## 2023-10-11 ASSESSMENT — PAIN SCALES - PAIN ENJOYMENT GENERAL ACTIVITY SCALE (PEG)
INTERFERED_ENJOYMENT_LIFE: 10 - COMPLETELY INTERFERES
INTERFERED_GENERAL_ACTIVITY: 10 - COMPLETELY INTERFERES
AVG_PAIN_PASTWEEK: 9
PEG_TOTALSCORE: 9.67

## 2023-10-11 ASSESSMENT — PAIN SCALES - GENERAL: PAINLEVEL: EXTREME PAIN (9)

## 2023-10-11 NOTE — LETTER
10/11/2023       RE: Ingrid Culver  [address] [facility] [address]       Dear Colleague,    Thank you for referring your patient, Ingrid Culver, to the [facility]NAG[facility] [place] at [facility]. Please see a copy of my visit note below.    [facility]    Date of visit: 10/11/2023    Chief complaint:   Chief Complaint   Patient presents with    Follow Up     Follow-up Lower back, Neck, Shoulder pain       Interval history:  Ingrid Culver was last seen by me on 7/12/2023.      Recommendations/plan at the last visit included:  Physical Therapy:  Continue daily HEPs  Clinical Health Psychologist to address issues of relaxation, behavioral change, coping style, and other factors important to improvement.  Not at this tiem  Diagnostic Studies:  none  Medication Management:  none  Further procedures recommended: Caudal VIVIENNE  Recommendations to PCP: none  Follow up: 6 weeks after procedure    Since her last visit, Ingrid Culver reports:    Underwent caudal VIVIENNE on 8/18/2023  Only mildly helpful   Will hold off on future injections at this time as only lasts for up to 2 weeks  No pain with sitting  Pain with standing/walking for prolonged time    Pain scores:  Pain intensity on average is 9 on a scale of 0-10.         Medications:  Current Outpatient Medications   Medication Sig Dispense Refill    Acetaminophen (TYLENOL PO) Take 500 mg by mouth 2 times daily      aspirin 81 MG tablet Take by mouth daily      azithromycin (ZITHROMAX) 250 MG tablet 1 tablet every M,W,F      Docusate Sodium (COLACE PO) Take by mouth 2 times daily      ipratropium - albuterol 0.5 mg/2.5 mg/3 mL (DUONEB) 0.5-2.5 (3) MG/3ML neb solution Take 1 vial by nebulization every 6 hours as needed for shortness of breath / dyspnea or wheezing      isosorbide dinitrate (ISORDIL) 30 MG tablet Take 30 mg by mouth daily       "LANsoprazole (PREVACID) 15 MG DR capsule Take 15 mg by mouth      levothyroxine (SYNTHROID/LEVOTHROID) 25 MCG tablet Take 1 tablet (25 mcg) by mouth daily 90 tablet 3    metoprolol tartrate (LOPRESSOR) 50 MG tablet Take 0.5 tablets (25 mg) by mouth 2 times daily Take one tablet (50 mg) each morning, and take 1/2 tablet (25 mg) at night (Patient taking differently: Take 25 mg by mouth 2 times daily 1/2 a pill BID) 90 tablet 3    Nitroglycerin (NITROTAB SL) Place 0.4 mg under the tongue      ondansetron (ZOFRAN) 4 MG tablet Take 1 tablet (4 mg) by mouth every 8 hours as needed for nausea 20 tablet 1    polyethylene glycol (MIRALAX/GLYCOLAX) Packet Take 1 packet by mouth daily      psyllium (METAMUCIL) WAFR Take 2 Wafers by mouth daily      rosuvastatin (CRESTOR) 10 MG tablet Take 1 tablet (10 mg) by mouth daily 90 tablet 3       Medical History: any changes in medical history since they were last seen? No    Review of Systems:  The 14 system ROS was reviewed from the intake questionnaire, and is positive for: back pain  Any bowel or bladder problems: denies  Mood: stable    Physical Exam:  Blood pressure 127/64, pulse 63, height 1.499 m (4' 11\"), weight 53.1 kg (117 lb), SpO2 96%.  General: AAOX3, NAD  Gait: Antalgic  MSK exam: 5/5 BLE strength, sensation intact    Assessment:   Lumbosacral radiculopathy  Severe scoliosis    Ingrid Culver is a 89 year old female who is seen at the pain clinic for follow up to discuss therapeutic options.  She will hold off of future injections at this time. We discussed conservative therapy including safe doses of acetaminophen (1g QID), diclofenac gel up to 4 times daily, and lidocaine and CBD patches/ointment    Plan:  Physical Therapy:  continue daily HEPs as tolerated  Clinical Health Psychologist to address issues of relaxation, behavioral change, coping style, and other factors important to improvement.  Not indicated  Diagnostic Studies:  none  Medication Management:  " Acetaminophen, Diclofenac gel, Lidocaine patches, CBD cream  Further procedures recommended: none  Recommendations to PCP: none  Follow up: as needed        Again, thank you for allowing me to participate in the care of your patient.      Sincerely,    Malu Rojas MD

## 2023-10-11 NOTE — PATIENT INSTRUCTIONS
Medications:    Recommend 1000 mg of Tylenol up to 4 times daily.    Continue use CBD cream/oil.    Lidocaine patches from over the counter. Wear 12 hrs on and 12 hours off. May use more than one patch at once.    Diclofenac Gel from over the counter.        Recommended Follow up:      Follow up as needed.       To speak with a nurse, schedule/reschedule/cancel a clinic appointment, or request a medication refill call: [phone]    You can also reach us by [employer]: [email or web address]

## 2023-10-11 NOTE — PROGRESS NOTES
[facility]    Date of visit: 10/11/2023    Chief complaint:   Chief Complaint   Patient presents with    Follow Up     Follow-up Lower back, Neck, Shoulder pain       Interval history:  Ingrid Culver was last seen by me on 7/12/2023.      Recommendations/plan at the last visit included:  Physical Therapy:  Continue daily HEPs  Clinical Health Psychologist to address issues of relaxation, behavioral change, coping style, and other factors important to improvement.  Not at this tiem  Diagnostic Studies:  none  Medication Management:  none  Further procedures recommended: Caudal VIVIENNE  Recommendations to PCP: none  Follow up: 6 weeks after procedure    Since her last visit, Ingrid Culver reports:    Underwent caudal VIVIENNE on 8/18/2023  Only mildly helpful   Will hold off on future injections at this time as only lasts for up to 2 weeks  No pain with sitting  Pain with standing/walking for prolonged time    Pain scores:  Pain intensity on average is 9 on a scale of 0-10.         Medications:  Current Outpatient Medications   Medication Sig Dispense Refill    Acetaminophen (TYLENOL PO) Take 500 mg by mouth 2 times daily      aspirin 81 MG tablet Take by mouth daily      azithromycin (ZITHROMAX) 250 MG tablet 1 tablet every M,W,F      Docusate Sodium (COLACE PO) Take by mouth 2 times daily      ipratropium - albuterol 0.5 mg/2.5 mg/3 mL (DUONEB) 0.5-2.5 (3) MG/3ML neb solution Take 1 vial by nebulization every 6 hours as needed for shortness of breath / dyspnea or wheezing      isosorbide dinitrate (ISORDIL) 30 MG tablet Take 30 mg by mouth daily      LANsoprazole (PREVACID) 15 MG DR capsule Take 15 mg by mouth      levothyroxine (SYNTHROID/LEVOTHROID) 25 MCG tablet Take 1 tablet (25 mcg) by mouth daily 90 tablet 3    metoprolol tartrate (LOPRESSOR) 50 MG tablet Take 0.5 tablets (25 mg) by mouth 2 times daily Take one tablet (50 mg) each morning, and take 1/2 tablet (25 mg) at night (Patient  "taking differently: Take 25 mg by mouth 2 times daily 1/2 a pill BID) 90 tablet 3    Nitroglycerin (NITROTAB SL) Place 0.4 mg under the tongue      ondansetron (ZOFRAN) 4 MG tablet Take 1 tablet (4 mg) by mouth every 8 hours as needed for nausea 20 tablet 1    polyethylene glycol (MIRALAX/GLYCOLAX) Packet Take 1 packet by mouth daily      psyllium (METAMUCIL) WAFR Take 2 Wafers by mouth daily      rosuvastatin (CRESTOR) 10 MG tablet Take 1 tablet (10 mg) by mouth daily 90 tablet 3       Medical History: any changes in medical history since they were last seen? No    Review of Systems:  The 14 system ROS was reviewed from the intake questionnaire, and is positive for: back pain  Any bowel or bladder problems: denies  Mood: stable    Physical Exam:  Blood pressure 127/64, pulse 63, height 1.499 m (4' 11\"), weight 53.1 kg (117 lb), SpO2 96%.  General: AAOX3, NAD  Gait: Antalgic  MSK exam: 5/5 BLE strength, sensation intact    Assessment:   Lumbosacral radiculopathy  Severe scoliosis    Ingrid Culver is a 89 year old female who is seen at the pain clinic for follow up to discuss therapeutic options.  She will hold off of future injections at this time. We discussed conservative therapy including safe doses of acetaminophen (1g QID), diclofenac gel up to 4 times daily, and lidocaine and CBD patches/ointment    Plan:  Physical Therapy:  continue daily HEPs as tolerated  Clinical Health Psychologist to address issues of relaxation, behavioral change, coping style, and other factors important to improvement.  Not indicated  Diagnostic Studies:  none  Medication Management:  Acetaminophen, Diclofenac gel, Lidocaine patches, CBD cream  Further procedures recommended: none  Recommendations to PCP: none  Follow up: as needed    Malu Rojas MD    Pain Medicine  Department of Anesthesiology  [facility]            "

## 2023-10-11 NOTE — NURSING NOTE
RN reviewed AVS with patient. Patient to contact clinic if any questions/concerns. Patient verbalized understanding.    Breanna Russell RN

## 2023-10-11 NOTE — NURSING NOTE
Patient presents with:  Follow Up: Follow-up Lower back, Neck, Shoulder pain      Extreme Pain (9)     Pain Medications       Analgesics Other Refills Start End     Acetaminophen (TYLENOL PO)          Sig - Route: Take 500 mg by mouth 2 times daily - Oral    Class: Historical      Salicylates Refills Start End     aspirin 81 MG tablet          Sig - Route: Take by mouth daily - Oral    Class: Historical            What medications are you using for pain? Tylenol    (New patients only) Have you been seen by another pain clinic/ provider? no    (Return Patients only) What refills are you needing today? no

## 2023-12-07 ENCOUNTER — MYC REFILL (OUTPATIENT)
Dept: FAMILY MEDICINE | Facility: CLINIC | Age: 88
End: 2023-12-07
Payer: COMMERCIAL

## 2023-12-07 DIAGNOSIS — R11.0 NAUSEA: ICD-10-CM

## 2023-12-07 RX ORDER — ONDANSETRON 4 MG/1
4 TABLET, FILM COATED ORAL EVERY 8 HOURS PRN
Qty: 20 TABLET | Refills: 0 | Status: SHIPPED | OUTPATIENT
Start: 2023-12-07 | End: 2024-01-19

## 2023-12-21 ENCOUNTER — VIRTUAL VISIT (OUTPATIENT)
Dept: INTERNAL MEDICINE | Facility: CLINIC | Age: 88
End: 2023-12-21
Payer: COMMERCIAL

## 2023-12-21 DIAGNOSIS — M81.0 AGE-RELATED OSTEOPOROSIS WITHOUT CURRENT PATHOLOGICAL FRACTURE: ICD-10-CM

## 2023-12-21 DIAGNOSIS — Z79.899 MEDICAL MARIJUANA USE: Primary | ICD-10-CM

## 2023-12-21 DIAGNOSIS — M54.16 LUMBAR RADICULOPATHY: ICD-10-CM

## 2023-12-21 PROCEDURE — 99213 OFFICE O/P EST LOW 20 MIN: CPT | Mod: 95 | Performed by: INTERNAL MEDICINE

## 2023-12-21 NOTE — PROGRESS NOTES
Ingrid is a 89 year old who is being evaluated via a billable video visit.      How would you like to obtain your AVS? MyChart  If the video visit is dropped, the invitation should be resent by: Text to cell phone: [phone]  Will anyone else be joining your video visit? No      5:38 PM   6:00 PM     Assessment & Plan     Medical marijuana use  I have been asked to see this patient who was a longterm primary care patient with Dr. Sheets from [facility] . She has chronic pain from severe scolioses and invasive therapy with shots to her back from the chronic pain clinic were entirely ineffective . She's absolutely not a good opioid pain medication candidate with her advanced age and she herself is opposed to this idea. What really pushed the idea of a Medical Marijuana certification was the fact that she's used an over the counter nonprescription low potency CBD creams as effective and daughter is under the impression that a cream with a much higher  concentration of CBD is available through the Office of Medical Cannabis ad she is right ! We reviewed this compound and that it is still technically described as an experimental therapy but they are good with that I agreed to provide Medical Marijuana certification today      Lumbar radiculopathy  As detailed above     Age-related osteoporosis without current pathological fracture  A repeat DEXA scan is not ordered. I am listed as primary health care provider but have never met this patient at a face to face encounter and so a follow up complete physical exam is recommended within nothing more then 3-6 months       Nam Huitron MD  [facility]    Earl Quintero is a 89 year old, presenting for the following health issues:  certification (Medical cannabis /)         No data to display                History of Present Illness       Reason for visit:  To discuss medical marijuana option for chronic back  pain    She eats 2-3 servings of fruits and vegetables daily.She consumes 1 sweetened beverage(s) daily.She exercises with enough effort to increase her heart rate 9 or less minutes per day.  She exercises with enough effort to increase her heart rate 3 or less days per week.   She is taking medications regularly.     Deals with chronic pain , has seen chronic pain clinics , pain from scolioses and she's is desperate at this point. Patient is against any sort of chronic opioid pain medication , she has tried a CBD creams with limited success    [email or web address]       Review of Systems   Constitutional, HEENT, cardiovascular, pulmonary, gi and gu systems are negative, except as otherwise noted.      Objective           Vitals:  No vitals were obtained today due to virtual visit.    Physical Exam   GENERAL: Healthy, alert and no distress  EYES: Eyes grossly normal to inspection.  No discharge or erythema, or obvious scleral/conjunctival abnormalities.  RESP: No audible wheeze, cough, or visible cyanosis.  No visible retractions or increased work of breathing.    SKIN: Visible skin clear. No significant rash, abnormal pigmentation or lesions.  NEURO: Cranial nerves grossly intact.  Mentation and speech appropriate for age.  PSYCH: Mentation appears normal, affect normal/bright, judgement and insight intact, normal speech and appearance well-groomed.        Video-Visit Details    Type of service:  Video Visit   Video Start Time: this was not a video office visit but rather, was a telephone MD visit   Video End Time:     Originating Location (pt. Location): Home    Distant Location (provider location):  On-site  Platform used for Video Visit: Unable to complete video visit and we had a telephone MD visit        Detail Level: Simple Additional Notes: Patient consent was obtained to proceed with the visit and recommended plan of care after discussion of all risks and benefits, including the risks of COVID-19 exposure.

## 2024-01-19 DIAGNOSIS — R11.0 NAUSEA: ICD-10-CM

## 2024-01-19 RX ORDER — ONDANSETRON 4 MG/1
4 TABLET, FILM COATED ORAL EVERY 8 HOURS PRN
Qty: 20 TABLET | Refills: 0 | Status: SHIPPED | OUTPATIENT
Start: 2024-01-19 | End: 2024-03-01

## 2024-02-22 ENCOUNTER — OFFICE VISIT (OUTPATIENT)
Dept: INTERNAL MEDICINE | Facility: CLINIC | Age: 89
End: 2024-02-22
Payer: COMMERCIAL

## 2024-02-22 VITALS
TEMPERATURE: 97.9 F | BODY MASS INDEX: 22.38 KG/M2 | RESPIRATION RATE: 14 BRPM | HEART RATE: 61 BPM | WEIGHT: 111 LBS | HEIGHT: 59 IN | DIASTOLIC BLOOD PRESSURE: 83 MMHG | SYSTOLIC BLOOD PRESSURE: 158 MMHG | OXYGEN SATURATION: 96 %

## 2024-02-22 DIAGNOSIS — M81.0 AGE-RELATED OSTEOPOROSIS WITHOUT CURRENT PATHOLOGICAL FRACTURE: ICD-10-CM

## 2024-02-22 DIAGNOSIS — Z23 NEED FOR SHINGLES VACCINE: ICD-10-CM

## 2024-02-22 DIAGNOSIS — Z29.11 NEED FOR VACCINATION AGAINST RESPIRATORY SYNCYTIAL VIRUS: ICD-10-CM

## 2024-02-22 DIAGNOSIS — I73.9 PERIPHERAL ARTERIAL DISEASE (H): ICD-10-CM

## 2024-02-22 DIAGNOSIS — J43.9 PULMONARY EMPHYSEMA, UNSPECIFIED EMPHYSEMA TYPE (H): ICD-10-CM

## 2024-02-22 DIAGNOSIS — H90.6 MIXED CONDUCTIVE AND SENSORINEURAL HEARING LOSS OF BOTH EARS: ICD-10-CM

## 2024-02-22 DIAGNOSIS — Z23 NEEDS FLU SHOT: ICD-10-CM

## 2024-02-22 DIAGNOSIS — N18.31 STAGE 3A CHRONIC KIDNEY DISEASE (H): ICD-10-CM

## 2024-02-22 DIAGNOSIS — E03.4 HYPOTHYROIDISM DUE TO ACQUIRED ATROPHY OF THYROID: ICD-10-CM

## 2024-02-22 DIAGNOSIS — Z00.00 WELLNESS EXAMINATION: Primary | ICD-10-CM

## 2024-02-22 DIAGNOSIS — I25.119 CORONARY ARTERY DISEASE INVOLVING NATIVE CORONARY ARTERY OF NATIVE HEART WITH ANGINA PECTORIS (H): ICD-10-CM

## 2024-02-22 DIAGNOSIS — R53.81 PHYSICAL DECONDITIONING: ICD-10-CM

## 2024-02-22 DIAGNOSIS — J47.9 BRONCHIECTASIS WITHOUT COMPLICATION (H): ICD-10-CM

## 2024-02-22 LAB — HGB BLD-MCNC: 11.8 G/DL (ref 11.7–15.7)

## 2024-02-22 PROCEDURE — 84443 ASSAY THYROID STIM HORMONE: CPT | Performed by: INTERNAL MEDICINE

## 2024-02-22 PROCEDURE — 82570 ASSAY OF URINE CREATININE: CPT | Performed by: INTERNAL MEDICINE

## 2024-02-22 PROCEDURE — 99397 PER PM REEVAL EST PAT 65+ YR: CPT | Mod: 25 | Performed by: INTERNAL MEDICINE

## 2024-02-22 PROCEDURE — 82043 UR ALBUMIN QUANTITATIVE: CPT | Performed by: INTERNAL MEDICINE

## 2024-02-22 PROCEDURE — 80061 LIPID PANEL: CPT | Performed by: INTERNAL MEDICINE

## 2024-02-22 PROCEDURE — 85018 HEMOGLOBIN: CPT | Performed by: INTERNAL MEDICINE

## 2024-02-22 PROCEDURE — 80048 BASIC METABOLIC PNL TOTAL CA: CPT | Performed by: INTERNAL MEDICINE

## 2024-02-22 PROCEDURE — 90686 IIV4 VACC NO PRSV 0.5 ML IM: CPT | Performed by: INTERNAL MEDICINE

## 2024-02-22 PROCEDURE — G0008 ADMIN INFLUENZA VIRUS VAC: HCPCS | Performed by: INTERNAL MEDICINE

## 2024-02-22 PROCEDURE — 36415 COLL VENOUS BLD VENIPUNCTURE: CPT | Performed by: INTERNAL MEDICINE

## 2024-02-22 PROCEDURE — 99214 OFFICE O/P EST MOD 30 MIN: CPT | Mod: 25 | Performed by: INTERNAL MEDICINE

## 2024-02-22 RX ORDER — RESPIRATORY SYNCYTIAL VIRUS VACCINE 120MCG/0.5
0.5 KIT INTRAMUSCULAR ONCE
Qty: 1 EACH | Refills: 0 | Status: CANCELLED | OUTPATIENT
Start: 2024-02-22 | End: 2024-02-22

## 2024-02-22 SDOH — HEALTH STABILITY: PHYSICAL HEALTH: ON AVERAGE, HOW MANY DAYS PER WEEK DO YOU ENGAGE IN MODERATE TO STRENUOUS EXERCISE (LIKE A BRISK WALK)?: 0 DAYS

## 2024-02-22 SDOH — HEALTH STABILITY: PHYSICAL HEALTH: ON AVERAGE, HOW MANY MINUTES DO YOU ENGAGE IN EXERCISE AT THIS LEVEL?: 0 MIN

## 2024-02-22 ASSESSMENT — SOCIAL DETERMINANTS OF HEALTH (SDOH): HOW OFTEN DO YOU GET TOGETHER WITH FRIENDS OR RELATIVES?: MORE THAN THREE TIMES A WEEK

## 2024-02-22 ASSESSMENT — PATIENT HEALTH QUESTIONNAIRE - PHQ9
10. IF YOU CHECKED OFF ANY PROBLEMS, HOW DIFFICULT HAVE THESE PROBLEMS MADE IT FOR YOU TO DO YOUR WORK, TAKE CARE OF THINGS AT HOME, OR GET ALONG WITH OTHER PEOPLE: VERY DIFFICULT
SUM OF ALL RESPONSES TO PHQ QUESTIONS 1-9: 21
SUM OF ALL RESPONSES TO PHQ QUESTIONS 1-9: 21

## 2024-02-22 NOTE — COMMUNITY RESOURCES LIST (ENGLISH)
02/22/2024    [facility] [employer]  N/A  For questions about this resource list or additional care needs, please contact your primary care clinic or care manager.  Phone: [phone]   Email: N/A   Address: [address] [place] [id number]   Hours: N/A        Exercise and Recreation       Gym or workout facility  1  [place] [employer] [facility] Recreation [place] Distance: 3.8 miles      In-Person   [address] [place] [address]  Language: English, Divehi  Hours: Mon - Fri 3:00 PM - 9:00 PM , Sat 12:00 PM - 6:00 PM  Fees: Free, Self Pay   Phone: [phone]) [phone] Ext.7934 Email: [email or web address] Website: [email or web address]     2  [employer] [facility] Distance: 4.14 miles      In-Person   [address] [place] [address]  Language: English  Hours: Mon - Sun Open 24 Hours  Fees: Insurance, Self Pay, Sliding Fee   Phone: [phone]) [phone] Email: [email or web address] Website: [email or web address]          Important Numbers & Websites       Emergency Services   911  [facility]   311  Poison Control   (482) 875-7885  Suicide Prevention Lifeline   (615) 564-7193 (TALK)  Child Abuse Hotline   (926) 896-5488 (4-A-Child)  Sexual Assault Hotline   (645) 223-7595 (HOPE)  National Runaway Safeline   (975) 347-1725 (RUNAWAY)  All-Options Talkline   (756) 617-1057  Substance Abuse Referral   (623) 633-3100 (HELP)

## 2024-02-22 NOTE — PROGRESS NOTES
Preventive Care Visit  [facility] Huitron MD, Internal Medicine  Feb 22, 2024    Assessment & Plan     Wellness examination  Routine screening issues, see orders section of this encounter   - REVIEW OF HEALTH MAINTENANCE PROTOCOL ORDERS    Need for shingles vaccine  Declines     Need for vaccination against respiratory syncytial virus  Declines     Stage 3a chronic kidney disease (H)  Problem is stable and ongoing monitoring    - Albumin Random Urine Quantitative with Creat Ratio; Future  - BASIC METABOLIC PANEL; Future  - Hemoglobin; Future  - Albumin Random Urine Quantitative with Creat Ratio  - BASIC METABOLIC PANEL  - Hemoglobin    Age-related osteoporosis without current pathological fracture  Due for this test / procedure / healthcare maintenance with further follow up depending on how things go with  test results   - DEXA HIP/PELVIS/SPINE - Future; Future    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 She sees a cardiologist regularly with [facility]. She's been taking metoprolol and Imdur (isosorbide mononitrate) and has now chronic lightheadedness and low blood pressure reading that are alarming. A 89 years old woman with blood pressure reading often below 100 systolic blood pressure and is so fearful of chronic sense of impending loss of consciousness so much that she is afraid to walk, she is not tolerating the cardiac medications namely her metoprolol and Imdur (isosorbide mononitrate) . Today I recommended she stop taking her Imdur (isosorbide mononitrate) and I agreed to notify her cardiologist [ Dr. Chantal Emery with [facility]  ] about this situation   - Lipid panel reflex to direct LDL Non-fasting; Future  - Lipid panel reflex to direct LDL Non-fasting    Hypothyroidism due to acquired atrophy of thyroid  Due for recheck, follow up as  indicated on results   - TSH WITH FREE T4 REFLEX; Future  - TSH WITH FREE T4 REFLEX    Physical deconditioning  This patient is a good candidate for home health care with an eye towards gait safety and strengthening referral   - Home Care Referral    Mixed conductive and sensorineural hearing loss of both ears  Referred to audiologic consultation at her request  - Adult Audiology  Referral; Future    Needs flu shot  To be administered   - REVIEW OF HEALTH MAINTENANCE PROTOCOL ORDERS    Pulmonary emphysema, unspecified emphysema type (H)  Problem is stable and ongoing monitoring      Bronchiectasis without complication (H)  Problem is stable and ongoing monitoring      Peripheral arterial disease (H24)  Problem is stable and ongoing monitoring      Patient lives in an independent autonomous existence  and family's goal is to keep her there as much as possible and as long as possible     Patient has been advised of split billing requirements and indicates understanding: Yes  Review of the result(s) of each unique test - today's tests  Prescription drug management  54 minutes spent by me on the date of the encounter doing chart review, history and exam, documentation and further activities per the note    Walks better using a walker  Used to do a lot of volunteering   Used to meet with a group of women and can't do that, can't drive anymore   Had Coronavirus (COVID-19) last September and seemingly since then she has shown a greater sense of declines, has been generalized weakness even just with standing  Wt Readings from Last 5 Encounters:   02/22/24 50.3 kg (111 lb)   10/11/23 53.1 kg (117 lb)   08/18/23 53.1 kg (117 lb)   07/12/23 53.1 kg (117 lb)   03/30/23 53.1 kg (117 lb)     Unintentional weight loss   Has a chronic nausea after eating  She takes the Ondansetron (ZOFRAN) approximately 1=2 times per day , daughter questions to what extent her medications might be a contributing factor to her nausea      Daughter had patient switch her medications as follows. Takes levothyroxine on emptry stomach, then eats and takes the rest of her medication     She's been taking nausea started since the Coronavirus (COVID-19) ,     Depression Screening Follow Up        2/22/2024     8:59 AM   PHQ   PHQ-9 Total Score 21   Q9: Thoughts of better off dead/self-harm past 2 weeks Not at all         2/22/2024     8:59 AM   Last PHQ-9   1.  Little interest or pleasure in doing things 3   2.  Feeling down, depressed, or hopeless 3   3.  Trouble falling or staying asleep, or sleeping too much 3   4.  Feeling tired or having little energy 3   5.  Poor appetite or overeating 3   6.  Feeling bad about yourself 3   7.  Trouble concentrating 0   8.  Moving slowly or restless 3   Q9: Thoughts of better off dead/self-harm past 2 weeks 0   PHQ-9 Total Score 21       Follow Up Actions Taken  Crisis resource information provided in After Visit Summary     Counseling  Appropriate preventive services were discussed with this patient, including applicable screening as appropriate for fall prevention, nutrition, physical activity, Tobacco-use cessation, weight loss and cognition.  Checklist reviewing preventive services available has been given to the patient.  Reviewed patient's diet, addressing concerns and/or questions.   The patient was instructed to see the dentist every 6 months.   Discussed possible causes of fatigue. Updated plan of care.  Patient reported difficulty with activities of daily living were addressed today.The patient was provided with written information regarding signs of hearing loss.   Information on urinary incontinence and treatment options given to patient.   The patient's PHQ-9 score is consistent with severe depression. She was provided with information regarding depression.         Earl Quintero is a 89 year old, presenting for the following:  Physical        2/22/2024    10:36 AM   Additional Questions    Roomed by Ania CHEN         Via the Health Maintenance questionnaire, the patient has reported the following services have been completed -Eye Exam, this information has been sent to the abstraction team.  Health Care Directive  Patient has a Health Care Directive on file  Advance care planning document is on file and is current.    HPI    Sleeps ok  Today is janelle first face to face encounter with this patient. Here  wdau   Her low back pain is mostly with standing  Has been attending the [facility] and has tried 2 different injections, this pain goes as far back as roughly scolioses and other issues, used to see Dr. Sheets from [facility]     Has some visual concerns and is working with Dr. Porfirio Rodriguez, ophthalmologist with [facility]     Discuss dizziness and back pain         2/22/2024   General Health   How would you rate your overall physical health? (!) FAIR   Feel stress (tense, anxious, or unable to sleep) To some extent   (!) STRESS CONCERN      2/22/2024   Nutrition   Diet: Regular (no restrictions)         2/22/2024   Exercise   Days per week of moderate/strenous exercise 0 days   Average minutes spent exercising at this level 0 min   (!) EXERCISE CONCERN      2/22/2024   Social Factors   Frequency of gathering with friends or relatives More than three times a week   Worry food won't last until get money to buy more No   Food not last or not have enough money for food? No   Do you have housing?  Yes   Are you worried about losing your housing? No   Lack of transportation? No   Unable to get utilities (heat,electricity)? No     Loss of balance - we discussed the possibilities of physical therapy and possibly a gait safety and strengthening referral   Was in the hospital recently, and was called by [employer]        2/22/2024   Fall Risk   Fallen 2 or more times in the past year? No    No   Trouble with walking or balance? Yes    Yes            2024   Activities of Daily Living- Home Safety   Needs help with the following daily activites Transportation    Shopping    Preparing meals    Housework    Bathing    Laundry   Safety concerns in the home None of the above         2024   Dental   Dentist two times every year? (!) NO         2024   Hearing Screening   Hearing concerns? (!) I NEED TO ASK PEOPLE TO SPEAK UP OR REPEAT THEMSELVES.    (!) TROUBLE UNDESTANDING A SPEAKER IN A PUBLIC MEETING OR [employer] SERVICE.    (!) TROUBLE UNDERSTANDING SOFT OR WHISPERED SPEECH.         2024   Driving Risk Screening   Patient/family members have concerns about driving No         2024   General Alertness/Fatigue Screening   Have you been more tired than usual lately? (!) YES         2024   Urinary Incontinence Screening   Bothered by leaking urine in past 6 months Yes         2024   TB Screening   Were you born outside of [place]?  No         Today's PHQ-9 Score:       2024     8:59 AM   PHQ-9 SCORE   PHQ-9 Total Score MyChart 21 (Severe depression)   PHQ-9 Total Score 21         2024   Substance Use   Alcohol more than 3/day or more than 7/wk Not Applicable   Do you have a current opioid prescription? No   How severe/bad is pain from 1 to 10? 8/10   Do you use any other substances recreationally? No     Social History     Tobacco Use    Smoking status: Former     Packs/day: 0.00     Years: 0.00     Additional pack years: 0.00     Total pack years: 0.00     Types: Cigarettes     Quit date: 1974     Years since quittin.3    Smokeless tobacco: Never   Vaping Use    Vaping Use: Never used   Substance Use Topics    Alcohol use: Yes     Comment: On special occassion    Drug use: No            No data to display                 No further mammograms to be done          Reviewed and updated as needed this visit by Provider                    Past Medical History:   Diagnosis Date    Allergies     idiopathic,  anaphyllactic rx's    Bronchiectasis     cough on inhalers recurrent infections    Cataract     Cerebral infarction (H)     COPD (chronic obstructive pulmonary disease) (H)     Disorders of lipoid metabolism     hx of on low fat diet    Esotropia chronic c prism 11/6/2013    Heart disease     Hypertension goal BP (blood pressure) < 150/90     Impingement syndrome of right shoulder 11/28/2022    Knee pain     Other bone/cartilage disease     Post-Nasal Drainage     Unspecified asthma(493.90)     Asthma    Unspecified hypothyroidism     Uterine fibroid     10/08 U/s shows 4X2.9X3/3     Past Surgical History:   Procedure Laterality Date    APPENDECTOMY      BREAST BIOPSY, RT/LT      Breat Biopsy RT/LT    CARDIAC SURGERY  12/03/2017    [facility]    CATARACT IOL, RT/LT      COLONOSCOPY  06/00   And 2008    diverticulosis    COLONOSCOPY  01/13/2012    ENDOSCOPY  01/13/2012    EXTRACAPSULAR CATARACT EXTRATION WITH INTRAOCULAR LENS IMPLANT  01/2000    both eyes    FLEXIBLE SIGMOIDOSCOPY  06/95,08/98    INJECT EPIDURAL LUMBAR N/A 3/30/2023    Procedure: Thoracolumbar Epidural Steroid Injection;  Surgeon: Malu Rojas MD;  Location: [facility] OR    INJECT EPIDURAL LUMBAR / SACRAL SINGLE N/A 8/18/2023    Procedure: Caudal Epidural Steroid Injection;  Surgeon: Malu Rojas MD;  Location: [facility] OR    JOINT REPLACEMTN, KNEE RT/LT  1992    right    YAG CAPSULOTOMY OD (RIGHT EYE) Right 01/31/2023    Dr. Rodrigeuz    YAG CAPSULOTOMY OS (LEFT EYE) Left 02/07/2023    Dr. Rodriguez    Z TOTAL KNEE ARTHROPLASTY  06/25/2003    left poly exchange     Lab work is in process  Labs reviewed in EPIC  BP Readings from Last 3 Encounters:   02/22/24 (!) 158/83   10/11/23 127/64   08/18/23 (!) 153/86    Wt Readings from Last 3 Encounters:   02/22/24 50.3 kg (111 lb)   10/11/23 53.1 kg (117 lb)   08/18/23 53.1 kg (117 lb)                  Patient Active Problem List   Diagnosis    SHOULDER IMPINGEMENT - left    Hyperlipidemia LDL goal <130    Insomnia     Trigger finger, acquired - right ring    Trigger thumb - right    Bronchiectasis (H)    Cerebral infarction (H)    Vitreous syneresis    PVD (posterior vitreous detachment)    Pseudophakia OU    Esotropia chronic c prism    Dry eyes, bilateral    Other idiopathic scoliosis, thoracolumbar region    Ganglion cyst of finger of right hand    Hypertension goal BP (blood pressure) < 150/90    Hypothyroidism due to acquired atrophy of thyroid    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)    Peripheral arterial disease (H24)    COPD (chronic obstructive pulmonary disease) (H)    Age-related osteoporosis without current pathological fracture    Impingement syndrome of right shoulder    Stage 3a chronic kidney disease (H)    Lumbar radiculopathy    Medical marijuana use     Past Surgical History:   Procedure Laterality Date    APPENDECTOMY      BREAST BIOPSY, RT/LT      Breat Biopsy RT/LT    CARDIAC SURGERY  12/03/2017    [facility]    CATARACT IOL, RT/LT      COLONOSCOPY  06/00   And 2008    diverticulosis    COLONOSCOPY  01/13/2012    ENDOSCOPY  01/13/2012    EXTRACAPSULAR CATARACT EXTRATION WITH INTRAOCULAR LENS IMPLANT  01/2000    both eyes    FLEXIBLE SIGMOIDOSCOPY  06/95,08/98    INJECT EPIDURAL LUMBAR N/A 3/30/2023    Procedure: Thoracolumbar Epidural Steroid Injection;  Surgeon: Malu Rojas MD;  Location: [facility] OR    INJECT EPIDURAL LUMBAR / SACRAL SINGLE N/A 8/18/2023    Procedure: Caudal Epidural Steroid Injection;  Surgeon: Malu Rojas MD;  Location: [facility] OR    JOINT REPLACEMTN, KNEE RT/LT  1992    right    YAG CAPSULOTOMY OD (RIGHT EYE) Right 01/31/2023    Dr. Rodriguez    YAG CAPSULOTOMY OS (LEFT EYE) Left 02/07/2023    Dr. Rodriguez    [facility] TOTAL KNEE ARTHROPLASTY  06/25/2003    left poly exchange       Social History     Tobacco Use    Smoking status: Former     Packs/day: 0.00     Years: 0.00     Additional pack years: 0.00     Total pack years: 0.00      Types: Cigarettes     Quit date: 1974     Years since quittin.3    Smokeless tobacco: Never   Substance Use Topics    Alcohol use: Yes     Comment: On special occassion     Family History   Problem Relation Age of Onset    Breast Cancer Mother     Arthritis Mother     Thyroid Disease Mother     Cardiovascular Father     Asthma Maternal Grandmother     Cancer Brother     Genetic Disorder Brother     Other Cancer Brother         Esophageal    Cancer Son         tonsil and tongue, bladder    Alcohol/Drug Son     Other Cancer Son     Heart Disease Daughter     Asthma Daughter     Genetic Disorder Brother     Other Cancer Brother     Glaucoma No family hx of     Macular Degeneration No family hx of          Current Outpatient Medications   Medication Sig Dispense Refill    Acetaminophen (TYLENOL PO) Take 500 mg by mouth 2 times daily      aspirin 81 MG tablet Take by mouth daily      azithromycin (ZITHROMAX) 250 MG tablet 1 tablet every ,W,      Docusate Sodium (COLACE PO) Take by mouth 2 times daily      ipratropium - albuterol 0.5 mg/2.5 mg/3 mL (DUONEB) 0.5-2.5 (3) MG/3ML neb solution Take 1 vial by nebulization every 6 hours as needed for shortness of breath / dyspnea or wheezing      isosorbide dinitrate (ISORDIL) 30 MG tablet Take 30 mg by mouth daily      LANsoprazole (PREVACID) 15 MG DR capsule Take 15 mg by mouth      levothyroxine (SYNTHROID/LEVOTHROID) 25 MCG tablet Take 1 tablet (25 mcg) by mouth daily 90 tablet 3    metoprolol tartrate (LOPRESSOR) 50 MG tablet Take 0.5 tablets (25 mg) by mouth 2 times daily Take one tablet (50 mg) each morning, and take 1/2 tablet (25 mg) at night (Patient taking differently: Take 25 mg by mouth 2 times daily 1/2 a pill BID) 90 tablet 3    NEW MED CBD gummies      Nitroglycerin (NITROTAB SL) Place 0.4 mg under the tongue      ondansetron (ZOFRAN) 4 MG tablet TAKE ONE TABLET BY MOUTH EVERY 8 HOURS AS NEEDED FOR NAUSEA 20 tablet 0    polyethylene glycol  (MIRALAX/GLYCOLAX) Packet Take 1 packet by mouth daily      psyllium (METAMUCIL) WAFR Take 2 Wafers by mouth daily      rosuvastatin (CRESTOR) 10 MG tablet Take 1 tablet (10 mg) by mouth daily 90 tablet 3     Allergies   Allergen Reactions    Zocor [Statins]      Recent Labs   Lab Test 02/17/23  1236 11/19/21  0836 08/21/20  0915 05/31/19  1305 12/02/17  0000 06/22/17  0753 04/11/16  0000 12/16/15  1051   LDL 56 74 69 75   < >  --    < > 91   HDL 55 56 56 61   < >  --    < > 63   TRIG 125 112 128 103   < >  --    < > 156*   ALT  --   --   --   --   --  22  --  32   CR 0.74 0.93 0.93 0.88   < >  --    < > 0.83   GFRESTIMATED 76 55* 55* 59*   < >  --    < > 65   GFRESTBLACK  --   --  64 69   < >  --    < > 79   POTASSIUM 4.9 4.9 4.5 4.8   < >  --    < > 4.9   TSH 1.95 3.02 2.75 1.79   < >  --    < > 2.04    < > = values in this interval not displayed.      Current providers sharing in care for this patient include:  Patient Care Team:  Nam Huitron MD as PCP - General (Internal Medicine)  Porfirio Rodriguez MD as Assigned Surgical Provider  Yunior Quiroga MD as Assigned Musculoskeletal Provider  Malu Rojas MD as MD (Anesthesiology)  Yunior Sampson MD as Assigned PCP    The following health maintenance items are reviewed in Epic and correct as of today:  Health Maintenance   Topic Date Due    MICROALBUMIN  Never done    RSV VACCINE (Pregnancy & 60+) (1 - 1-dose 60+ series) Never done    ZOSTER IMMUNIZATION (3 of 3) 10/16/2020    INFLUENZA VACCINE (1) 09/01/2023    COVID-19 Vaccine (5 - 2023-24 season) 09/01/2023    DEXA  10/02/2023    EYE EXAM  01/26/2024    BMP  02/17/2024    LIPID  02/17/2024    ANNUAL REVIEW OF HM ORDERS  02/17/2024    MEDICARE ANNUAL WELLNESS VISIT  02/17/2024    TSH W/FREE T4 REFLEX  02/17/2024    HEMOGLOBIN  02/17/2024    FALL RISK ASSESSMENT  02/22/2025    ADVANCE CARE PLANNING  02/17/2028    DTAP/TDAP/TD IMMUNIZATION (4 - Td or Tdap) 04/17/2028    SPIROMETRY  Completed    COPD  "ACTION PLAN  Completed    PHQ-2 (once per calendar year)  Completed    Pneumococcal Vaccine: 65+ Years  Completed    URINALYSIS  Completed    IPV IMMUNIZATION  Aged Out    HPV IMMUNIZATION  Aged Out    MENINGITIS IMMUNIZATION  Aged Out    RSV MONOCLONAL ANTIBODY  Aged Out    MAMMO SCREENING  Discontinued     .  Health Maintenance Due   Topic Date Due    MICROALBUMIN  Never done    RSV VACCINE (Pregnancy & 60+) (1 - 1-dose 60+ series) Never done    ZOSTER IMMUNIZATION (3 of 3) 10/16/2020    INFLUENZA VACCINE (1) 09/01/2023    COVID-19 Vaccine (5 - 2023-24 season) 09/01/2023    DEXA  10/02/2023    EYE EXAM  01/26/2024    BMP  02/17/2024    LIPID  02/17/2024    ANNUAL REVIEW OF HM ORDERS  02/17/2024    MEDICARE ANNUAL WELLNESS VISIT  02/17/2024    TSH W/FREE T4 REFLEX  02/17/2024    HEMOGLOBIN  02/17/2024      Dr. Chantal Emery with [facility]  and Bernabe Stokes, nurse practitioner with [facility] [place] - notify them of the need to cut out Isordil (Isosorbide Dinitrate) and this can be modified down the road       Review of Systems  Constitutional, HEENT, cardiovascular, pulmonary, gi and gu systems are negative, except as otherwise noted.     Objective    Exam  BP (!) 158/83 (BP Location: Left arm, Patient Position: Sitting, Cuff Size: Adult Small)   Pulse 61   Temp 97.9  F (36.6  C) (Temporal)   Resp 14   Ht 1.499 m (4' 11\")   Wt 50.3 kg (111 lb)   SpO2 96%   BMI 22.42 kg/m     Estimated body mass index is 23.63 kg/m  as calculated from the following:    Height as of 10/11/23: 1.499 m (4' 11\").    Weight as of 10/11/23: 53.1 kg (117 lb).    Physical Exam  GENERAL: alert and no distress, appears her stated age , talkative and appropriate. Normal grooming and affect   EYES: Eyes grossly normal to inspection, PERRL and conjunctivae and sclerae normal  HENT: ear canals and TM's normal, nose and mouth without ulcers or lesions  NECK: no adenopathy, no asymmetry, masses, or " scars  RESP: lungs clear to auscultation - no rales, rhonchi or wheezes  CV: regular rate and rhythm, normal S1 S2, no S3 or S4, no murmur, click or rub, no peripheral edema  ABDOMEN: soft, nontender, no hepatosplenomegaly, no masses and bowel sounds normal  MS: no gross musculoskeletal defects noted, no edema  SKIN: no suspicious lesions or rashes  NEURO: Normal strength and tone, mentation intact and speech normal  PSYCH: mentation appears normal, affect normal/bright         No data to display                     Signed Electronically by: Nam Huitron MD    Answers submitted by the patient for this visit:  Patient Health Questionnaire (Submitted on 2/22/2024)  If you checked off any problems, how difficult have these problems made it for you to do your work, take care of things at home, or get along with other people?: Very difficult  PHQ9 TOTAL SCORE: 21

## 2024-02-22 NOTE — LETTER
February 23, 2024    Ingridpio Culver  [address] [facility] [address]          Dear ,    We are writing to inform you of your test results.  All of these tests are within acceptable limits , things look good !       Resulted Orders   Albumin Random Urine Quantitative with Creat Ratio   Result Value Ref Range    Creatinine Urine mg/dL 70.9 mg/dL      Comment:      The reference ranges have not been established in urine creatinine. The results should be integrated into the clinical context for interpretation.    Albumin Urine mg/L <12.0 mg/L      Comment:      The reference ranges have not been established in urine albumin. The results should be integrated into the clinical context for interpretation.    Albumin Urine mg/g Cr        Comment:      Unable to calculate, urine albumin and/or urine creatinine is outside detectable limits.  Microalbuminuria is defined as an albumin:creatinine ratio of 17 to 299 for males and 25 to 299 for females. A ratio of albumin:creatinine of 300 or higher is indicative of overt proteinuria.  Due to biologic variability, positive results should be confirmed by a second, first-morning random or 24-hour timed urine specimen. If there is discrepancy, a third specimen is recommended. When 2 out of 3 results are in the microalbuminuria range, this is evidence for incipient nephropathy and warrants increased efforts at glucose control, blood pressure control, and institution of therapy with an angiotensin-converting-enzyme (ACE) inhibitor (if the patient can tolerate it).     BASIC METABOLIC PANEL   Result Value Ref Range    Sodium 138 135 - 145 mmol/L      Comment:      Reference intervals for this test were updated on 09/26/2023 to more accurately reflect our healthy population. There may be differences in the flagging of prior results with similar values performed with this method. Interpretation of those prior results can be made in the context of the updated  reference intervals.     Potassium 4.9 3.4 - 5.3 mmol/L    Chloride 102 98 - 107 mmol/L    Carbon Dioxide (CO2) 28 22 - 29 mmol/L    Anion Gap 8 7 - 15 mmol/L    Urea Nitrogen 30.2 (H) 8.0 - 23.0 mg/dL    Creatinine 0.87 0.51 - 0.95 mg/dL    GFR Estimate 63 >60 mL/min/1.73m2    Calcium 9.5 8.2 - 9.6 mg/dL    Glucose 79 70 - 99 mg/dL   Lipid panel reflex to direct LDL Non-fasting   Result Value Ref Range    Cholesterol 136 <200 mg/dL    Triglycerides 111 <150 mg/dL    Direct Measure HDL 48 (L) >=50 mg/dL    LDL Cholesterol Calculated 66 <=100 mg/dL    Non HDL Cholesterol 88 <130 mg/dL    Patient Fasting > 8hrs? No     Narrative    Cholesterol  Desirable:  <200 mg/dL    Triglycerides  Normal:  Less than 150 mg/dL  Borderline High:  150-199 mg/dL  High:  200-499 mg/dL  Very High:  Greater than or equal to 500 mg/dL    Direct Measure HDL  Female:  Greater than or equal to 50 mg/dL   Male:  Greater than or equal to 40 mg/dL    LDL Cholesterol  Desirable:  <100mg/dL  Above Desirable:  100-129 mg/dL   Borderline High:  130-159 mg/dL   High:  160-189 mg/dL   Very High:  >= 190 mg/dL    Non HDL Cholesterol  Desirable:  130 mg/dL  Above Desirable:  130-159 mg/dL  Borderline High:  160-189 mg/dL  High:  190-219 mg/dL  Very High:  Greater than or equal to 220 mg/dL   TSH WITH FREE T4 REFLEX   Result Value Ref Range    TSH 1.31 0.30 - 4.20 uIU/mL   Hemoglobin   Result Value Ref Range    Hemoglobin 11.8 11.7 - 15.7 g/dL       If you have any questions or concerns, please call the clinic at the number listed above.       Sincerely,      Nam Huitron MD

## 2024-02-22 NOTE — Clinical Note
Please draft a letter addressed to Dr. Chantal Emery with [facility]    Something like this  Jeremías Rizzo. I saw this patient for a checkup and am really concerned with her low blood pressure readings and lightheadedness and fears of sense of impending loss of consciousness   She's been taking metoprolol and Imdur (isosorbide mononitrate) and has now chronic lightheadedness and low blood pressure reading that are alarming. A 89 years old woman with blood pressure reading often below 100 systolic blood pressure and is so fearful of chronic sense of impending loss of consciousness so much that she is afraid to walk, she is not tolerating the cardiac medications namely her metoprolol and Imdur (isosorbide mononitrate) . Today I recommended she stop taking her Imdur (isosorbide mononitrate) and I agreed to notify her cardiologist [ Dr. Chantal Emery with [facility]  ] about this situation   Just alerting you to this situation and that I put Imdur (isosorbide mononitrate) on hold

## 2024-02-22 NOTE — LETTER
[facility]  [address]  [place] [address]  [phone]          February 22, 2024    Barbi Emery MD  [facility]  [address]  [place]  [id number]    Re:  Ingrid Culver    YOB: 1932        Dear Dr. Emery:                                                                                                                 Hi.  I saw this patient for a checkup and am really concerned with her low blood pressure readings and lightheadedness and fears of sense of impending loss of consciousness.     She's been taking metoprolol and Imdur (isosorbide mononitrate) and has now chronic lightheadedness and low blood pressure readings that are alarming. A 89 year old woman with blood pressure reading often below 100 systolic blood pressure, and is so fearful of chronic sense of impending loss of consciousness, so much that she is afraid to walk.  She is not tolerating the cardiac medications namely her metoprolol and Imdur (isosorbide mononitrate) .     Today I recommended she stop taking her Imdur (isosorbide mononitrate) and I agreed to notify your about this situation.     Just alerting you to this situation and that I put Imdur (isosorbide mononitrate) on hold.    Sincerely,         Nam Huitron MD/joyce

## 2024-02-23 ENCOUNTER — TELEPHONE (OUTPATIENT)
Dept: INTERNAL MEDICINE | Facility: CLINIC | Age: 89
End: 2024-02-23
Payer: COMMERCIAL

## 2024-02-23 DIAGNOSIS — R53.81 PHYSICAL DECONDITIONING: Primary | ICD-10-CM

## 2024-02-23 LAB
ANION GAP SERPL CALCULATED.3IONS-SCNC: 8 MMOL/L (ref 7–15)
BUN SERPL-MCNC: 30.2 MG/DL (ref 8–23)
CALCIUM SERPL-MCNC: 9.5 MG/DL (ref 8.2–9.6)
CHLORIDE SERPL-SCNC: 102 MMOL/L (ref 98–107)
CHOLEST SERPL-MCNC: 136 MG/DL
CREAT SERPL-MCNC: 0.87 MG/DL (ref 0.51–0.95)
CREAT UR-MCNC: 70.9 MG/DL
DEPRECATED HCO3 PLAS-SCNC: 28 MMOL/L (ref 22–29)
EGFRCR SERPLBLD CKD-EPI 2021: 63 ML/MIN/1.73M2
FASTING STATUS PATIENT QL REPORTED: NO
GLUCOSE SERPL-MCNC: 79 MG/DL (ref 70–99)
HDLC SERPL-MCNC: 48 MG/DL
LDLC SERPL CALC-MCNC: 66 MG/DL
MICROALBUMIN UR-MCNC: <12 MG/L
MICROALBUMIN/CREAT UR: NORMAL MG/G{CREAT}
NONHDLC SERPL-MCNC: 88 MG/DL
POTASSIUM SERPL-SCNC: 4.9 MMOL/L (ref 3.4–5.3)
SODIUM SERPL-SCNC: 138 MMOL/L (ref 135–145)
TRIGL SERPL-MCNC: 111 MG/DL
TSH SERPL DL<=0.005 MIU/L-ACNC: 1.31 UIU/ML (ref 0.3–4.2)

## 2024-02-23 NOTE — TELEPHONE ENCOUNTER
Received staff message from [facility].  They are currently at capacity and unable to take this patient    CC referral placed to assists with finding an alternate HC agency    Octavio Bañuelos RN  [facility]

## 2024-02-26 ENCOUNTER — PATIENT OUTREACH (OUTPATIENT)
Dept: CARE COORDINATION | Facility: CLINIC | Age: 89
End: 2024-02-26
Payer: COMMERCIAL

## 2024-02-26 NOTE — PROGRESS NOTES
CHW received call from [facility] and they are able to accept patient.   Orders, demographics, notes and insurance information already faxed.     They have sent this over to their scheduling team and will contact patient to set up initial visit.     GIOVANI Bates [place], [place], [place], [place] and [facility]  [phone]

## 2024-02-26 NOTE — PROGRESS NOTES
Home care order faxed to:  [facility]  [facility]  [facility]  [facility]    Waiting on responses to see who has capacity for patient.     GIOVANI Bates [place], [place] and [facility]  [phone]

## 2024-02-26 NOTE — LETTER
February 26, 2024      Ingrid Culver  [address]  [place] [address]        This is a Home Care Order from PCP. Requesting agency to review and call back with either acceptance or denial for patient.    Documentation of Face to Face and Certification for Home Health Services     I attest that I saw or will see Ingrid Culver on this date:  2/22/2024     This encounter with the patient was in whole, or in part, for medical condition, which is the primary reason for Home Health Care:       Patient Active Problem List   Diagnosis    SHOULDER IMPINGEMENT - left    Hyperlipidemia LDL goal <130    Insomnia    Trigger finger, acquired - right ring    Trigger thumb - right    Bronchiectasis (H)    Cerebral infarction (H)    Vitreous syneresis    PVD (posterior vitreous detachment)    Pseudophakia OU    Esotropia chronic c prism    Dry eyes, bilateral    Other idiopathic scoliosis, thoracolumbar region    Ganglion cyst of finger of right hand    Hypertension goal BP (blood pressure) < 150/90    Hypothyroidism due to acquired atrophy of thyroid    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 absences from home require considerable and taxing effort and are for medical reasons or Mandaeism services or infrequently or of short duration when for other reasons) related  to:Requires assistance of another person or specialized equipment is needed     Based on the above findings, I certify that this patient is confined to the home and needs intermittent skilled nursing care, physical therapy and/or speech therapy. The patient is under my care, and I have initiated the establishment of the plan of care. This patient will be followed by a physician who will periodically review the plan of care.        Physician/Provider to provide follow up care: Provider to follow patient: AALIYAH HUITRON [[id number]]        Please be aware that coverage of these services is subject to the terms and limitations of your health insurance plan.  Call member services at your health plan with any benefit or coverage questions.  _______________________________________________________________________  Authorized by:  Aaliyah Huitron MD       PLEASE EVALUATE AND TREAT (Evaluation timeline is 24 - 48 hrs. Please call if there is need for a variance to this timeline).     Medications:         Current Outpatient Medications   Medication Sig Dispense Refill    Acetaminophen (TYLENOL PO) Take 500 mg by mouth 2 times daily        aspirin 81 MG tablet Take by mouth daily        azithromycin (ZITHROMAX) 250 MG tablet 1 tablet every M,W,F        Docusate Sodium (COLACE PO) Take by mouth 2 times daily        ipratropium - albuterol 0.5 mg/2.5 mg/3 mL (DUONEB) 0.5-2.5 (3) MG/3ML neb solution Take 1 vial by nebulization every 6 hours as needed for shortness of breath / dyspnea or wheezing        isosorbide dinitrate (ISORDIL) 30 MG tablet Take 30 mg by mouth daily        LANsoprazole (PREVACID) 15 MG DR capsule Take 15 mg by mouth        levothyroxine (SYNTHROID/LEVOTHROID) 25 MCG tablet Take 1 tablet (25 mcg) by mouth daily 90 tablet 3    metoprolol tartrate (LOPRESSOR) 50 MG tablet Take 0.5 tablets (25 mg) by mouth 2 times daily Take one tablet (50 mg) each morning, and take 1/2 tablet (25 mg) at night (Patient taking  differently: Take 25 mg by mouth 2 times daily 1/2 a pill BID) 90 tablet 3    NEW MED CBD gummies        Nitroglycerin (NITROTAB SL) Place 0.4 mg under the tongue        ondansetron (ZOFRAN) 4 MG tablet TAKE ONE TABLET BY MOUTH EVERY 8 HOURS AS NEEDED FOR NAUSEA 20 tablet 0    polyethylene glycol (MIRALAX/GLYCOLAX) Packet Take 1 packet by mouth daily        psyllium (METAMUCIL) WAFR Take 2 Wafers by mouth daily        rosuvastatin (CRESTOR) 10 MG tablet Take 1 tablet (10 mg) by mouth daily 90 tablet 3      Problems:      Patient Active Problem List   Diagnosis    SHOULDER IMPINGEMENT - left    Hyperlipidemia LDL goal <130    Insomnia    Trigger finger, acquired - right ring    Trigger thumb - right    Bronchiectasis (H)    Cerebral infarction (H)    Vitreous syneresis    PVD (posterior vitreous detachment)    Pseudophakia OU    Esotropia chronic c prism    Dry eyes, bilateral    Other idiopathic scoliosis, thoracolumbar region    Ganglion cyst of finger of right hand    Hypertension goal BP (blood pressure) < 150/90    Hypothyroidism due to acquired atrophy of thyroid    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)    Peripheral arterial disease (H24)    COPD (chronic obstructive pulmonary disease) (H)    Age-related osteoporosis without current pathological fracture    Impingement syndrome of right shoulder    Stage 3a chronic kidney disease (H)    Lumbar radiculopathy    Medical marijuana use      Diet:  None        Code Status:    Code Status: Full Code     Allergies:  Zocor [statins]             Order  Home Care Referral [[id number]] (Order [id number])  Referral Details    Referred By  Referred To   Nam Huitron MD   [address] [address]   Phone: [phone]   Fax: [phone]    Diagnoses: Physical deconditioning   Order: Home Care Referral    [facility]   [address]   [facility] [address]   Phone:  [phone]   Fax: [phone]           Nam Huitron MD   [address]   [facility] [address]   Phone: [phone]   Fax: [phone]    Diagnoses: Mixed conductive and sensorineural hearing loss of both ears   Order: Adult Audiology  Referral   Reason: Assess and Evaluate       Comment: Please be aware that coverage of these services is subject to the terms and limitations of your health insurance plan.  Call member services at your health plan with any benefit or coverage questions.    [facility] will call you to coordinate your care as prescribed by the provider. If you don t hear from a representative within 2 business days, please call [phone].     Patient Name  Ingrid Culver MRN  [id number] Legal Sex  Female   1932     Patient Demographics    Address  [address] [place] [address] Phone  [phone] (Home) *Preferred*  [phone] (Mobile) E-mail Address  [email or web address]     Order Details    Frequency Duration Priority Order Class   None None Routine: Next available opening  Home Care     Order Providers    Authorizing Provider Encounter Provider   Nam Huitron MD Lubka, Reuben, MD     ABN Associated with this Order    There is no ABN associated with this order.                    Ordering Provider's NPI: [id number]  Nam Huitron    Home Care Referral [[id number]]    Electronically signed by: Nam Huitron MD on 24 1145 Status: Active   Ordering user: Nam Huitron MD 24 1145     Order History  Outpatient  Date/Time Action Taken User Additional Information   24 1145 Sign Nam Huitron MD      Order Questions    Question Answer   Reason for Referral: Physical Therapy   Note: Must select at least one of Skilled Nursing, Physical Therapy, and/or Speech Therapy in addition to any other services.   Physical Therapy Eval and Treat for: Home Safety Assessment    Therapeutic Exercise   Is the patient homebound? Yes   Homebound  Status (describe the functional limitations that support this patient is confined to his/her home. Medicaid recipients are not required to be homebound.): Requires assistance of another person or specialized equipment is needed   I attest that I saw or will see the patient on this date: 2/22/2024   Provider to follow patient JANIYATODAALIYAH            Point of contact:  GIOVANI Bates [place], [place], [place] and [facility]  [phone]

## 2024-02-28 ENCOUNTER — TELEPHONE (OUTPATIENT)
Dept: FAMILY MEDICINE | Facility: CLINIC | Age: 89
End: 2024-02-28
Payer: COMMERCIAL

## 2024-02-28 NOTE — TELEPHONE ENCOUNTER
Home Care is calling regarding an established patient with [facility].       Requesting orders from: Nam Huitron  Provider is following patient: Yes  Is this a 60-day recertification request?  No    Orders Requested    Physical Therapy  Request for delay in care, service is not able to be provided within same scheduled day.   March 1st per patient request       Verbal orders given.  Home Care will send orders for provider to sign.  Confirmed ok to leave a detailed message with call back.  Contact information confirmed and updated as needed.    Shakira Bray RN

## 2024-03-01 ENCOUNTER — MYC REFILL (OUTPATIENT)
Dept: FAMILY MEDICINE | Facility: CLINIC | Age: 89
End: 2024-03-01

## 2024-03-01 ENCOUNTER — OFFICE VISIT (OUTPATIENT)
Dept: OPHTHALMOLOGY | Facility: CLINIC | Age: 89
End: 2024-03-01
Payer: COMMERCIAL

## 2024-03-01 DIAGNOSIS — Z96.1 PSEUDOPHAKIA: Primary | ICD-10-CM

## 2024-03-01 DIAGNOSIS — H50.00 ESOTROPIA: ICD-10-CM

## 2024-03-01 DIAGNOSIS — H43.813 POSTERIOR VITREOUS DETACHMENT OF BOTH EYES: ICD-10-CM

## 2024-03-01 DIAGNOSIS — H52.13 MYOPIA OF BOTH EYES WITH REGULAR ASTIGMATISM AND PRESBYOPIA: ICD-10-CM

## 2024-03-01 DIAGNOSIS — H04.123 DRY EYES, BILATERAL: ICD-10-CM

## 2024-03-01 DIAGNOSIS — R11.0 NAUSEA: ICD-10-CM

## 2024-03-01 DIAGNOSIS — H52.4 MYOPIA OF BOTH EYES WITH REGULAR ASTIGMATISM AND PRESBYOPIA: ICD-10-CM

## 2024-03-01 DIAGNOSIS — H52.223 MYOPIA OF BOTH EYES WITH REGULAR ASTIGMATISM AND PRESBYOPIA: ICD-10-CM

## 2024-03-01 PROCEDURE — 92014 COMPRE OPH EXAM EST PT 1/>: CPT | Performed by: STUDENT IN AN ORGANIZED HEALTH CARE EDUCATION/TRAINING PROGRAM

## 2024-03-01 PROCEDURE — 92015 DETERMINE REFRACTIVE STATE: CPT | Performed by: STUDENT IN AN ORGANIZED HEALTH CARE EDUCATION/TRAINING PROGRAM

## 2024-03-01 RX ORDER — ONDANSETRON 4 MG/1
4 TABLET, FILM COATED ORAL EVERY 8 HOURS PRN
Qty: 20 TABLET | Refills: 1 | Status: SHIPPED | OUTPATIENT
Start: 2024-03-01 | End: 2024-04-29

## 2024-03-01 ASSESSMENT — TONOMETRY
IOP_METHOD: APPLANATION
OS_IOP_MMHG: 20
OD_IOP_MMHG: 19

## 2024-03-01 ASSESSMENT — VISUAL ACUITY
METHOD: SNELLEN - LINEAR
CORRECTION_TYPE: GLASSES
OD_CC+: -2
OS_CC: 20/30
OD_CC: 20/30

## 2024-03-01 ASSESSMENT — REFRACTION_MANIFEST
OD_CYLINDER: +1.25
OS_HPRISM: 12.5
OS_AXIS: 002
OD_SPHERE: -1.75
OD_AXIS: 150
OS_SPHERE: -1.00
OD_HPRISM: 12.5
OD_VPRISM: 2.5
OD_ADD: +3.00
OS_CYLINDER: +1.75
OS_ADD: +3.00

## 2024-03-01 ASSESSMENT — CUP TO DISC RATIO
OS_RATIO: 0.4
OD_RATIO: 0.45

## 2024-03-01 ASSESSMENT — REFRACTION_WEARINGRX
SPECS_TYPE: PAL
OD_SPHERE: -2.00
OD_VBASE: UP
OD_HBASE: OUT
OS_AXIS: 180
OS_HBASE: OUT
OD_VPRISM: 2.5
OS_SPHERE: -1.50
OS_ADD: +3.00
OD_CYLINDER: +0.75
OD_ADD: +3.00
OD_AXIS: 150
OS_HPRISM: 11.5
OD_HPRISM: 11.5
OS_CYLINDER: +1.75

## 2024-03-01 ASSESSMENT — CONF VISUAL FIELD
OD_SUPERIOR_NASAL_RESTRICTION: 0
OS_SUPERIOR_TEMPORAL_RESTRICTION: 0
OD_INFERIOR_TEMPORAL_RESTRICTION: 0
OS_SUPERIOR_NASAL_RESTRICTION: 0
OD_SUPERIOR_TEMPORAL_RESTRICTION: 0
OD_INFERIOR_NASAL_RESTRICTION: 0
OS_INFERIOR_NASAL_RESTRICTION: 0
OS_NORMAL: 1
METHOD: COUNTING FINGERS
OD_NORMAL: 1
OS_INFERIOR_TEMPORAL_RESTRICTION: 0

## 2024-03-01 ASSESSMENT — SLIT LAMP EXAM - LIDS
COMMENTS: LEVATOR DEHISCENSE
COMMENTS: LEVATOR DEHISCENSE

## 2024-03-01 ASSESSMENT — EXTERNAL EXAM - LEFT EYE: OS_EXAM: NORMAL

## 2024-03-01 ASSESSMENT — REFRACTION_FINALRX
OS_HBASE: OUT
OD_HPRISM: 12.5
OS_HPRISM: 12.5
OD_VPRISM: 2.5
OD_HBASE: OUT

## 2024-03-01 ASSESSMENT — EXTERNAL EXAM - RIGHT EYE: OD_EXAM: NORMAL

## 2024-03-01 NOTE — LETTER
"    3/1/2024         RE: Ingrid Culver  [address]  [place] [address]        Dear Colleague,    Thank you for referring your patient, Ingrid Culver, to the [facility]. Please see a copy of my visit note below.     Current Eye Medications:  none     Subjective:  comprehensive eye exam - with daughter Loni.    Pt says vision is getting blurred vision left eye again, harder to see the television x 2 mos. Also noticing intermittent episodes of double vision when watching TV and sitting in the car, has not tried covering an eye to see if it resolves.     Loni notices that glasses are often crooked on patient's face. Upon observing pt - I can see that the bows are not wrapping around her ears will, causing the glasses to move up. Left eye also has \"goopy\" through the night, not using drops. Loni observes that pt has been rubbing her eye a lot.      Objective:  See Ophthalmology Exam.       Assessment:  Ingrid Culver is a 89 year old female who presents with:   Encounter Diagnoses   Name Primary?     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 Avoid generic artificial tears or \"get the red out\" drops).  Use more often if the left eye comes goopy.    Glasses prescription given     Porfirio Rodriguez MD  [phone]     Again, thank you for allowing me to participate in the care of your patient.        Sincerely,        Porfirio Rodriguez MD  "

## 2024-03-01 NOTE — PROGRESS NOTES
" Current Eye Medications:  none     Subjective:  comprehensive eye exam - with daughter Loni.    Pt says vision is getting blurred vision left eye again, harder to see the television x 2 mos. Also noticing intermittent episodes of double vision when watching TV and sitting in the car, has not tried covering an eye to see if it resolves.     Loni notices that glasses are often crooked on patient's face. Upon observing pt - I can see that the bows are not wrapping around her ears will, causing the glasses to move up. Left eye also has \"goopy\" through the night, not using drops. Loni observes that pt has been rubbing her eye a lot.      Objective:  See Ophthalmology Exam.       Assessment:  Ingrid Culver is a 89 year old female who presents with:   Encounter Diagnoses   Name Primary?    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 Avoid generic artificial tears or \"get the red out\" drops).  Use more often if the left eye comes goopy.    Glasses prescription given     Porfirio Rodriguez MD  [phone]     "

## 2024-03-01 NOTE — PATIENT INSTRUCTIONS
"Use artificial tears twice a day (Refresh Optive, Systane Balance, or TheraTears. Avoid generic artificial tears or \"get the red out\" drops).  Use more often if the left eye comes goopy.    Glasses prescription given     Porfirio Rodriguez MD  [phone]   "

## 2024-03-26 ENCOUNTER — TELEPHONE (OUTPATIENT)
Dept: INTERNAL MEDICINE | Facility: CLINIC | Age: 89
End: 2024-03-26
Payer: COMMERCIAL

## 2024-03-26 NOTE — TELEPHONE ENCOUNTER
Reason for Call:  Form, our goal is to have forms completed with 72 hours, however, some forms may require a visit or additional information.    Type of letter, form or note:  Home Health Certification    Who is the form from?: Home care    Where did the form come from: form was faxed in    What clinic location was the form placed at?: [facility]    Where the form was placed: Given to physician    What number is listed as a contact on the form?: [phone]       Call taken on 3/26/2024 at 10:32 AM by Nohemy Cedillo

## 2024-03-26 NOTE — TELEPHONE ENCOUNTER
[facility] received and given to Dr. Huitron to sign when he returns to the office on Thursday, March 28th, 2024. Nohemy Cedillo,

## 2024-03-27 ENCOUNTER — OFFICE VISIT (OUTPATIENT)
Dept: AUDIOLOGY | Facility: CLINIC | Age: 89
End: 2024-03-27
Attending: INTERNAL MEDICINE
Payer: COMMERCIAL

## 2024-03-27 DIAGNOSIS — H90.3 SENSORINEURAL HEARING LOSS, BILATERAL: ICD-10-CM

## 2024-03-27 PROCEDURE — 92550 TYMPANOMETRY & REFLEX THRESH: CPT | Performed by: AUDIOLOGIST

## 2024-03-27 PROCEDURE — 92557 COMPREHENSIVE HEARING TEST: CPT | Performed by: AUDIOLOGIST

## 2024-03-27 NOTE — PROGRESS NOTES
AUDIOLOGY REPORT    SUBJECTIVE:  Ingrid Culver is a 89 year old female who was seen in the Audiology Clinic at the [facility] for audiologic evaluation, referred by Nam Huitron M.D.  The patient reports a history of hearing loss including hearing aid use. The patient denies  bilateral tinnitus, bilateral otalgia, bilateral drainage, bilateral aural fullness, family history of hearing loss, and history of noise exposure.  The patient notes difficulty with communication in a variety of listening situations. She also reports that her current hearing aids don't fit her ears very well.  They were accompanied today by their daughter.    OBJECTIVE:  Abuse Screening:  Do you feel unsafe at home or work/school? No  Do you feel threatened by someone? No  Does anyone try to keep you from having contact with others, or doing things outside of your home? No  Physical signs of abuse present? No     Fall Risk Screen:  1. Have you fallen two or more times in the past year? No  2. Have you fallen and had an injury in the past year? No    Timed Up and Go Score (in seconds): not tested  Is patient a fall risk? No  Referral initiated: No  Fall Risk Assessment Completed by Audiology    Otoscopic exam indicates ears are clear of cerumen bilaterally     Pure Tone Thresholds assessed using conventional audiometry with good  reliability from 250-8000 Hz bilaterally using insert earphones and circumaural headphones     RIGHT:  borderline-normal and mild from 250-1000 Hz sloping to moderate, moderate-severe, and severe sensorineural hearing loss    LEFT:    mild from 250-1000 Hz sloping to moderate, moderate-severe, and severe sensorineural hearing loss    Tympanogram:    RIGHT: normal eardrum mobility    LEFT:   normal eardrum mobility    Reflexes (reported by stimulus ear):  RIGHT: Ipsilateral is present at normal levels  RIGHT: Contralateral is absent at frequencies tested  LEFT:   Ipsilateral is absent at  frequencies tested  LEFT:   Contralateral is present at normal levels      Speech Reception Threshold:    RIGHT: 50 dB HL    LEFT:   50 dB HL    Speech Reception Thresholds are in good agreement with pure tone thresholds.    Word Recognition Score:     RIGHT: 72% at 85 dB HL using NU-6 recorded word list.    LEFT:   80% at 85 dB HL using NU-6 recorded word list.      ASSESSMENT:     ICD-10-CM    1. Sensorineural hearing loss, bilateral  H90.3 Adult Audiology  Referral           Today s results were discussed with the patient in detail.     PLAN:  Patient was counseled regarding hearing loss and impact on communication.  Patient is a good candidate for amplification at this time. Handout on good communication strategies, and hearing aid use was given to patient.It is recommended that the patient look into getting new custom earmolds for her current TruHearing hearing aids or look into custom in the ear instruments.  Please call this clinic with questions regarding these results or recommendations.          Mahad Rojo MA, CCC-A  [place] Licensed Audiologist #[id number]  3/27/2024

## 2024-03-28 ENCOUNTER — TRANSFERRED RECORDS (OUTPATIENT)
Dept: HEALTH INFORMATION MANAGEMENT | Facility: CLINIC | Age: 89
End: 2024-03-28

## 2024-03-28 DIAGNOSIS — Z53.9 DIAGNOSIS NOT YET DEFINED: Primary | ICD-10-CM

## 2024-03-28 PROCEDURE — G0180 MD CERTIFICATION HHA PATIENT: HCPCS | Performed by: INTERNAL MEDICINE

## 2024-04-29 DIAGNOSIS — R11.0 NAUSEA: ICD-10-CM

## 2024-04-29 RX ORDER — ONDANSETRON 4 MG/1
4 TABLET, FILM COATED ORAL EVERY 8 HOURS PRN
Qty: 20 TABLET | Refills: 1 | Status: SHIPPED | OUTPATIENT
Start: 2024-04-29 | End: 2024-07-05

## 2024-07-04 DIAGNOSIS — R11.0 NAUSEA: ICD-10-CM

## 2024-07-04 DIAGNOSIS — E03.4 HYPOTHYROIDISM DUE TO ACQUIRED ATROPHY OF THYROID: ICD-10-CM

## 2024-07-05 RX ORDER — LEVOTHYROXINE SODIUM 25 UG/1
TABLET ORAL
Qty: 90 TABLET | Refills: 1 | Status: SHIPPED | OUTPATIENT
Start: 2024-07-05

## 2024-07-05 RX ORDER — ONDANSETRON 4 MG/1
4 TABLET, FILM COATED ORAL EVERY 8 HOURS PRN
Qty: 20 TABLET | Refills: 0 | Status: SHIPPED | OUTPATIENT
Start: 2024-07-05 | End: 2024-08-23

## 2024-08-22 DIAGNOSIS — R11.0 NAUSEA: ICD-10-CM

## 2024-08-23 RX ORDER — ONDANSETRON 4 MG/1
4 TABLET, FILM COATED ORAL EVERY 8 HOURS PRN
Qty: 20 TABLET | Refills: 0 | Status: SHIPPED | OUTPATIENT
Start: 2024-08-23

## 2024-12-30 ENCOUNTER — TELEPHONE (OUTPATIENT)
Dept: FAMILY MEDICINE | Facility: CLINIC | Age: 89
End: 2024-12-30
Payer: COMMERCIAL

## 2025-01-06 NOTE — TELEPHONE ENCOUNTER
Pt returned call and says she doesn't want any calls from us in regards to any appointments.  Loni, Pt's daughter, is not on a release.  I would have to give at least two pieces of PHI in order for daughter to know what I am talking about to be able to schedule.     Pt denying AWV as of now.     oJaquín Murillo

## 2025-01-10 ENCOUNTER — ANCILLARY PROCEDURE (OUTPATIENT)
Dept: MAMMOGRAPHY | Facility: CLINIC | Age: OVER 89
End: 2025-01-10
Attending: INTERNAL MEDICINE
Payer: COMMERCIAL

## 2025-01-10 DIAGNOSIS — N63.11 MASS OF UPPER OUTER QUADRANT OF RIGHT BREAST: ICD-10-CM

## 2025-01-10 PROCEDURE — 76642 ULTRASOUND BREAST LIMITED: CPT | Mod: RT

## 2025-01-10 PROCEDURE — 77066 DX MAMMO INCL CAD BI: CPT

## 2025-01-13 ENCOUNTER — ANCILLARY PROCEDURE (OUTPATIENT)
Dept: ULTRASOUND IMAGING | Facility: CLINIC | Age: OVER 89
End: 2025-01-13
Attending: INTERNAL MEDICINE
Payer: COMMERCIAL

## 2025-01-13 DIAGNOSIS — R22.1 LUMP IN NECK: ICD-10-CM

## 2025-01-13 PROCEDURE — 76536 US EXAM OF HEAD AND NECK: CPT | Mod: TC | Performed by: RADIOLOGY

## 2025-01-16 DIAGNOSIS — E03.4 HYPOTHYROIDISM DUE TO ACQUIRED ATROPHY OF THYROID: ICD-10-CM

## 2025-01-16 RX ORDER — LEVOTHYROXINE SODIUM 25 UG/1
TABLET ORAL
Qty: 90 TABLET | Refills: 0 | Status: SHIPPED | OUTPATIENT
Start: 2025-01-16

## 2025-01-17 NOTE — TELEPHONE ENCOUNTER
Team- Please schedule lab appointment around 2/22 for TSH lab re-check. There is a future order in chart from Dr. Huitron.   Also pt is due for annual wellness exam on or after 2/22/25, so if she wants could just schedule this and get thyroid labs done at annual wellness exam.      Appears that TSH was not flagging on refill protocol because pt not due yet. It will only flag if last TSH/T4 abnormal or overdue.    Anum Espinoza RN

## 2025-01-17 NOTE — TELEPHONE ENCOUNTER
TSH   Date Value Ref Range Status   02/22/2024 1.31 0.30 - 4.20 uIU/mL Final   02/17/2023 1.95 0.40 - 4.00 mU/L Final   08/21/2020 2.75 0.40 - 4.00 mU/L Final     Health Maintenance Due   Topic Date Due    ZOSTER IMMUNIZATION (3 of 3) 10/16/2020    DEXA  10/02/2023    COVID-19 Vaccine (5 - 2024-25 season) 09/01/2024    PHQ-2 (once per calendar year)  01/01/2025    HEMOGLOBIN  02/22/2025      Questions - why is her need for TSH ( thyroid test ) not listed here ? The standard of care  is , even when the TSH ( thyroid test ) is within normal limits, a recheck is every single year at the very least once    See refill prescription sig bright Huitron MD

## 2025-01-21 NOTE — TELEPHONE ENCOUNTER
Called and spoke to patient. Informed patient of message and patient verbally understand. Patient asked for writer to call daughter Loni regarding scheduling.     Called daughter Loni and left message to have daughter call back to clinic to schedule for Wellness Visit and TSH check after 02/22/2025. Waiting call from daughter Loni.    Manuela Wilson, CMA

## 2025-02-27 ENCOUNTER — TELEPHONE (OUTPATIENT)
Dept: FAMILY MEDICINE | Facility: CLINIC | Age: OVER 89
End: 2025-02-27
Payer: COMMERCIAL

## 2025-02-27 NOTE — TELEPHONE ENCOUNTER
RN called pt. Rn was talking to pt but pt wanted RN to talk to daughter Laura (no consent on file) about the information from provider. RN got permission from Ingrid to speak with Laura to relay the providers message below.     Laura stated that she did not have any further questions. Laura did state that her other sister Deepak is the primary person that takes care of all of the pt medical stuff and we should call Deepak in the future with information. RN informed Laura that there was no consent to communicate on file for either Laura or Deepak and we would need to have this to speak to either one of the pts daughters about pts medical information. RN informed Laura that this could be filled out the next time Deepak comes to an appointment with the pt. Laura stated that she will relay the message from the PCP below and the information about the consent to communicate to deepak.     Message from PCP that was relayed to Laura:        Eileen Enriquez RN

## 2025-02-27 NOTE — TELEPHONE ENCOUNTER
[place] RN with [facility] calling with TOY for Dr. Huitron, she is referencing phone encounter visible to us in Epic, copied below.   They have not gotten a response from patient yet via phone or [employer]CRM[employer] and will try a few more times before closing encounter.   Pulmonology figures patient likely WON'T agree to the suggested bronchoscopy, given her age.     Mostly they want to be sure Dr. Huitron sees:   Incidentally noted to have moderate sized hiatal hernia and severe coronary artery calcification.         Telephone Encounter - Tu Lechuga MD - 02/26/2025 12:27 PM CST  Formatting of this note might be different from the original.  I attempted to contact the patient via phone to discuss her recent CT scan and sputum results, however there was no answer x 2. CT consistent with known severe bronchiectasis in the lingula left lower lobe with new versus worsening bronchiectasis and right middle lobe and right lower lobe with mild reticulonodular infiltrates. Findings suggestive of atypical infectious process such as nontuberculous Mycobacterium. AFB sputum sample x 2 negative. Yeast on sputum cultures likely contaminant/colonizer. Based on CT scan results may need to consider bronchoscopy for further evaluation of possible nontuberculous Mycobacterium infection. However, if infection is confirmed would require at least 1 year of treatment with antimicrobial agents with no guarantees of improvement in symptoms.    Pulmonary RN, please call the patient to review recent CT and sputum results with recommendation for possible bronchoscopy. If the patient is not wanting to undergo evaluation with bronchoscopy (which would be a reasonable decision based on her age and comorbidities), we can review findings in further detail at her follow-up appointment in April. Continue inhaler/nebulizers and other airway clearance measures including vest therapy as previously discussed/ordered.    Please assist  in sending FYI message about incidental findings to her non-[facility] PCP: Incidentally noted to have moderate sized hiatal hernia and severe coronary artery calcification.    Tu Lechuga MD  Pulmonary and Critical Care Medicine  [facility] [place]  2/26/2025 12:38 PM       Routed to Dr. Huitron as FYI.    Olga JONES RN  [facility] Triage

## 2025-03-06 ENCOUNTER — OFFICE VISIT (OUTPATIENT)
Dept: OPHTHALMOLOGY | Facility: CLINIC | Age: OVER 89
End: 2025-03-06
Payer: COMMERCIAL

## 2025-03-06 DIAGNOSIS — Z01.00 EXAMINATION OF EYES AND VISION: Primary | ICD-10-CM

## 2025-03-06 DIAGNOSIS — H52.13 MYOPIA OF BOTH EYES WITH REGULAR ASTIGMATISM AND PRESBYOPIA: ICD-10-CM

## 2025-03-06 DIAGNOSIS — H50.00 ESOTROPIA: ICD-10-CM

## 2025-03-06 DIAGNOSIS — H43.813 POSTERIOR VITREOUS DETACHMENT OF BOTH EYES: ICD-10-CM

## 2025-03-06 DIAGNOSIS — H52.4 MYOPIA OF BOTH EYES WITH REGULAR ASTIGMATISM AND PRESBYOPIA: ICD-10-CM

## 2025-03-06 DIAGNOSIS — Z96.1 PSEUDOPHAKIA: ICD-10-CM

## 2025-03-06 DIAGNOSIS — H04.123 DRY EYES, BILATERAL: ICD-10-CM

## 2025-03-06 DIAGNOSIS — H52.223 MYOPIA OF BOTH EYES WITH REGULAR ASTIGMATISM AND PRESBYOPIA: ICD-10-CM

## 2025-03-06 ASSESSMENT — VISUAL ACUITY
METHOD: SNELLEN - LINEAR
CORRECTION_TYPE: GLASSES
OD_PH_CC: 20/40
OD_CC+: -2
OS_CC+: -2
OS_CC: 20/30
OD_PH_CC+: -2
OD_CC: 20/50

## 2025-03-06 ASSESSMENT — REFRACTION_MANIFEST
OS_SPHERE: -1.25
OS_ADD: +3.00
OD_CYLINDER: +1.50
OD_AXIS: 158
OD_SPHERE: -2.25
OD_ADD: +3.00
OS_CYLINDER: +1.75
OS_AXIS: 014

## 2025-03-06 ASSESSMENT — CONF VISUAL FIELD
OD_INFERIOR_TEMPORAL_RESTRICTION: 0
OD_SUPERIOR_TEMPORAL_RESTRICTION: 0
OS_INFERIOR_NASAL_RESTRICTION: 0
OS_SUPERIOR_NASAL_RESTRICTION: 0
OD_SUPERIOR_NASAL_RESTRICTION: 0
OS_NORMAL: 1
OS_SUPERIOR_TEMPORAL_RESTRICTION: 0
OD_NORMAL: 1
OD_INFERIOR_NASAL_RESTRICTION: 0
OS_INFERIOR_TEMPORAL_RESTRICTION: 0

## 2025-03-06 ASSESSMENT — REFRACTION_WEARINGRX
OS_AXIS: 180
OD_VPRISM: 2.5
OS_CYLINDER: +1.75
SPECS_TYPE: PAL
OS_SPHERE: -1.50
OD_CYLINDER: +0.75
OS_HBASE: OUT
OS_HPRISM: 11.5
OD_ADD: +3.00
OD_HBASE: OUT
OD_SPHERE: -2.00
OD_HPRISM: 11.5
OD_VBASE: UP
OD_AXIS: 150
OS_ADD: +3.00

## 2025-03-06 ASSESSMENT — REFRACTION_FINALRX
OD_HPRISM: 12.5
OD_HBASE: OUT
OD_VPRISM: 2.5
OS_HBASE: OUT
OS_HPRISM: 12.5

## 2025-03-06 ASSESSMENT — TONOMETRY
OS_IOP_MMHG: 19
IOP_METHOD: APPLANATION
OD_IOP_MMHG: 22

## 2025-03-06 ASSESSMENT — CUP TO DISC RATIO
OS_RATIO: 0.4
OD_RATIO: 0.45

## 2025-03-06 ASSESSMENT — SLIT LAMP EXAM - LIDS
COMMENTS: LEVATOR DEHISCENSE
COMMENTS: LEVATOR DEHISCENSE

## 2025-03-06 ASSESSMENT — PATIENT HEALTH QUESTIONNAIRE - PHQ9: SUM OF ALL RESPONSES TO PHQ QUESTIONS 1-9: 8

## 2025-03-06 ASSESSMENT — EXTERNAL EXAM - LEFT EYE: OS_EXAM: NORMAL

## 2025-03-06 ASSESSMENT — EXTERNAL EXAM - RIGHT EYE: OD_EXAM: NORMAL

## 2025-03-06 NOTE — PROGRESS NOTES
" Current Eye Medications:  Artificial Tears both eyes once per day      Subjective:  Patient returns today for her annual eye health exam. She has been noticing a \"big\" difference in her vision compared to last year. She is struggling to see the TV and seeing near. When looking at the TV she states it looks \"double\", although this is not new she feels it is worsening. Patient also says she feels a \"pressure\" in both eyes at times, the left eye is worse. She hasn't noticed any floaters and has no flashing lights. She has no other ocular concerns today.      Objective:  See Ophthalmology Exam.       Assessment:  Ingrid Culver is a 89 year old female who presents with:   Encounter Diagnoses   Name Primary?    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 Avoid generic artificial tears or \"get the red out\" drops).     Use warm compresses daily on the eyes     Glasses prescription given - recommend updating    Porfirio Rodriguez MD  [phone]          "

## 2025-03-06 NOTE — PATIENT INSTRUCTIONS
"Use artificial tears at least twice per day (Refresh Plus or Refresh Optive or TheraTears. Avoid generic artificial tears or \"get the red out\" drops).     Use warm compresses daily on the eyes     Glasses prescription given - recommend updating    Porfirio Rodriguez MD  [phone]        "

## 2025-03-22 ENCOUNTER — HEALTH MAINTENANCE LETTER (OUTPATIENT)
Age: OVER 89
End: 2025-03-22

## 2025-03-31 ENCOUNTER — OFFICE VISIT (OUTPATIENT)
Dept: INTERNAL MEDICINE | Facility: CLINIC | Age: OVER 89
End: 2025-03-31
Payer: COMMERCIAL

## 2025-03-31 VITALS
DIASTOLIC BLOOD PRESSURE: 80 MMHG | OXYGEN SATURATION: 96 % | RESPIRATION RATE: 16 BRPM | SYSTOLIC BLOOD PRESSURE: 150 MMHG | HEIGHT: 59 IN | WEIGHT: 102 LBS | TEMPERATURE: 97.4 F | HEART RATE: 71 BPM | BODY MASS INDEX: 20.56 KG/M2

## 2025-03-31 DIAGNOSIS — Z00.00 ENCOUNTER FOR MEDICARE ANNUAL WELLNESS EXAM: Primary | ICD-10-CM

## 2025-03-31 DIAGNOSIS — K21.9 GASTROESOPHAGEAL REFLUX DISEASE WITHOUT ESOPHAGITIS: ICD-10-CM

## 2025-03-31 DIAGNOSIS — I42.8 NICM (NONISCHEMIC CARDIOMYOPATHY) (H): ICD-10-CM

## 2025-03-31 DIAGNOSIS — L89.311 PRESSURE INJURY OF RIGHT BUTTOCK, STAGE 1: ICD-10-CM

## 2025-03-31 DIAGNOSIS — F32.A DEPRESSION, UNSPECIFIED DEPRESSION TYPE: ICD-10-CM

## 2025-03-31 DIAGNOSIS — Z63.4 BEREAVEMENT: ICD-10-CM

## 2025-03-31 DIAGNOSIS — I27.29 OTHER SECONDARY PULMONARY HYPERTENSION (H): ICD-10-CM

## 2025-03-31 DIAGNOSIS — R63.4 UNINTENDED WEIGHT LOSS: ICD-10-CM

## 2025-03-31 DIAGNOSIS — J47.9 BRONCHIECTASIS WITHOUT COMPLICATION (H): ICD-10-CM

## 2025-03-31 DIAGNOSIS — N18.31 STAGE 3A CHRONIC KIDNEY DISEASE (H): ICD-10-CM

## 2025-03-31 DIAGNOSIS — E03.4 HYPOTHYROIDISM DUE TO ACQUIRED ATROPHY OF THYROID: ICD-10-CM

## 2025-03-31 DIAGNOSIS — E78.5 HYPERLIPIDEMIA LDL GOAL <130: ICD-10-CM

## 2025-03-31 LAB
BASOPHILS # BLD AUTO: 0.1 10E3/UL (ref 0–0.2)
BASOPHILS NFR BLD AUTO: 1 %
EOSINOPHIL # BLD AUTO: 0.2 10E3/UL (ref 0–0.7)
EOSINOPHIL NFR BLD AUTO: 2 %
ERYTHROCYTE [DISTWIDTH] IN BLOOD BY AUTOMATED COUNT: 14.2 % (ref 10–15)
ERYTHROCYTE [SEDIMENTATION RATE] IN BLOOD BY WESTERGREN METHOD: 33 MM/HR (ref 0–30)
HCT VFR BLD AUTO: 37.8 % (ref 35–47)
HGB BLD-MCNC: 12 G/DL (ref 11.7–15.7)
IMM GRANULOCYTES # BLD: 0 10E3/UL
IMM GRANULOCYTES NFR BLD: 0 %
LYMPHOCYTES # BLD AUTO: 1.6 10E3/UL (ref 0.8–5.3)
LYMPHOCYTES NFR BLD AUTO: 19 %
MCH RBC QN AUTO: 29.3 PG (ref 26.5–33)
MCHC RBC AUTO-ENTMCNC: 31.7 G/DL (ref 31.5–36.5)
MCV RBC AUTO: 92 FL (ref 78–100)
MONOCYTES # BLD AUTO: 0.8 10E3/UL (ref 0–1.3)
MONOCYTES NFR BLD AUTO: 9 %
NEUTROPHILS # BLD AUTO: 5.8 10E3/UL (ref 1.6–8.3)
NEUTROPHILS NFR BLD AUTO: 69 %
PLATELET # BLD AUTO: 278 10E3/UL (ref 150–450)
RBC # BLD AUTO: 4.1 10E6/UL (ref 3.8–5.2)
WBC # BLD AUTO: 8.5 10E3/UL (ref 4–11)

## 2025-03-31 PROCEDURE — 99214 OFFICE O/P EST MOD 30 MIN: CPT | Mod: 25 | Performed by: INTERNAL MEDICINE

## 2025-03-31 PROCEDURE — 3079F DIAST BP 80-89 MM HG: CPT | Performed by: INTERNAL MEDICINE

## 2025-03-31 PROCEDURE — 85652 RBC SED RATE AUTOMATED: CPT | Performed by: INTERNAL MEDICINE

## 2025-03-31 PROCEDURE — G2211 COMPLEX E/M VISIT ADD ON: HCPCS | Performed by: INTERNAL MEDICINE

## 2025-03-31 PROCEDURE — 36415 COLL VENOUS BLD VENIPUNCTURE: CPT | Performed by: INTERNAL MEDICINE

## 2025-03-31 PROCEDURE — 84443 ASSAY THYROID STIM HORMONE: CPT | Performed by: INTERNAL MEDICINE

## 2025-03-31 PROCEDURE — 3077F SYST BP >= 140 MM HG: CPT | Performed by: INTERNAL MEDICINE

## 2025-03-31 PROCEDURE — 82607 VITAMIN B-12: CPT | Performed by: INTERNAL MEDICINE

## 2025-03-31 PROCEDURE — G0439 PPPS, SUBSEQ VISIT: HCPCS | Performed by: INTERNAL MEDICINE

## 2025-03-31 PROCEDURE — 86140 C-REACTIVE PROTEIN: CPT | Performed by: INTERNAL MEDICINE

## 2025-03-31 PROCEDURE — 85025 COMPLETE CBC W/AUTO DIFF WBC: CPT | Performed by: INTERNAL MEDICINE

## 2025-03-31 PROCEDURE — 80053 COMPREHEN METABOLIC PANEL: CPT | Performed by: INTERNAL MEDICINE

## 2025-03-31 RX ORDER — ROSUVASTATIN CALCIUM 10 MG/1
10 TABLET, COATED ORAL DAILY
Qty: 90 TABLET | Refills: 3 | Status: SHIPPED | OUTPATIENT
Start: 2025-03-31

## 2025-03-31 RX ORDER — PANTOPRAZOLE SODIUM 40 MG/1
40 TABLET, DELAYED RELEASE ORAL DAILY
Qty: 90 TABLET | Refills: 3 | Status: SHIPPED | OUTPATIENT
Start: 2025-03-31

## 2025-03-31 RX ORDER — LEVOTHYROXINE SODIUM 25 UG/1
25 TABLET ORAL
Qty: 90 TABLET | Refills: 3 | Status: SHIPPED | OUTPATIENT
Start: 2025-03-31

## 2025-03-31 RX ORDER — FAMOTIDINE 10 MG
10 TABLET ORAL 2 TIMES DAILY
Status: SHIPPED
Start: 2025-03-31

## 2025-03-31 RX ORDER — CITALOPRAM HYDROBROMIDE 10 MG/1
10 TABLET ORAL DAILY
Qty: 90 TABLET | Refills: 1 | Status: SHIPPED | OUTPATIENT
Start: 2025-03-31

## 2025-03-31 SDOH — HEALTH STABILITY: PHYSICAL HEALTH: ON AVERAGE, HOW MANY MINUTES DO YOU ENGAGE IN EXERCISE AT THIS LEVEL?: 0 MIN

## 2025-03-31 SDOH — HEALTH STABILITY: PHYSICAL HEALTH: ON AVERAGE, HOW MANY DAYS PER WEEK DO YOU ENGAGE IN MODERATE TO STRENUOUS EXERCISE (LIKE A BRISK WALK)?: 0 DAYS

## 2025-03-31 SDOH — SOCIAL STABILITY - SOCIAL INSECURITY: DISSAPEARANCE AND DEATH OF FAMILY MEMBER: Z63.4

## 2025-03-31 ASSESSMENT — PATIENT HEALTH QUESTIONNAIRE - PHQ9
10. IF YOU CHECKED OFF ANY PROBLEMS, HOW DIFFICULT HAVE THESE PROBLEMS MADE IT FOR YOU TO DO YOUR WORK, TAKE CARE OF THINGS AT HOME, OR GET ALONG WITH OTHER PEOPLE: SOMEWHAT DIFFICULT
SUM OF ALL RESPONSES TO PHQ QUESTIONS 1-9: 17
SUM OF ALL RESPONSES TO PHQ QUESTIONS 1-9: 17

## 2025-03-31 ASSESSMENT — SOCIAL DETERMINANTS OF HEALTH (SDOH): HOW OFTEN DO YOU GET TOGETHER WITH FRIENDS OR RELATIVES?: ONCE A WEEK

## 2025-03-31 NOTE — LETTER
April 2, 2025      Ingrid Culver  [address] [facility] [address]        Dear Ingrid:    We are writing to inform you of your test results.    Every single one of these tests is within acceptable limits. Please let me know if you have any questions for me about all of these lab tests.    Resulted Orders   Comprehensive metabolic panel (BMP + Alb, Alk Phos, ALT, AST, Total. Bili, TP)   Result Value Ref Range    Sodium 136 135 - 145 mmol/L    Potassium 5.3 3.4 - 5.3 mmol/L    Carbon Dioxide (CO2) 25 22 - 29 mmol/L    Anion Gap 11 7 - 15 mmol/L    Urea Nitrogen 29.7 (H) 8.0 - 23.0 mg/dL    Creatinine 0.73 0.51 - 0.95 mg/dL    GFR Estimate 77 >60 mL/min/1.73m2      Comment:      eGFR calculated using 2021 CKD-EPI equation.    Calcium 9.6 8.8 - 10.4 mg/dL    Chloride 100 98 - 107 mmol/L    Glucose 92 70 - 99 mg/dL    Alkaline Phosphatase 66 40 - 150 U/L    AST 26 0 - 45 U/L    ALT 12 0 - 50 U/L    Protein Total 7.4 6.4 - 8.3 g/dL    Albumin 4.1 3.5 - 5.2 g/dL    Bilirubin Total 0.4 <=1.2 mg/dL   TSH with free T4 reflex   Result Value Ref Range    TSH 1.73 0.30 - 4.20 uIU/mL   Vitamin B12   Result Value Ref Range    Vitamin B12 834 232 - 1,245 pg/mL   ESR: Erythrocyte sedimentation rate   Result Value Ref Range    Erythrocyte Sedimentation Rate 33 (H) 0 - 30 mm/hr   CRP, inflammation   Result Value Ref Range    CRP Inflammation <3.00 <5.00 mg/L   CBC with platelets and differential   Result Value Ref Range    WBC Count 8.5 4.0 - 11.0 10e3/uL    RBC Count 4.10 3.80 - 5.20 10e6/uL    Hemoglobin 12.0 11.7 - 15.7 g/dL    Hematocrit 37.8 35.0 - 47.0 %    MCV 92 78 - 100 fL    MCH 29.3 26.5 - 33.0 pg    MCHC 31.7 31.5 - 36.5 g/dL    RDW 14.2 10.0 - 15.0 %    Platelet Count 278 150 - 450 10e3/uL    % Neutrophils 69 %    % Lymphocytes 19 %    % Monocytes 9 %    % Eosinophils 2 %    % Basophils 1 %    % Immature Granulocytes 0 %    Absolute Neutrophils 5.8 1.6 - 8.3 10e3/uL    Absolute Lymphocytes 1.6 0.8 - 5.3  10e3/uL    Absolute Monocytes 0.8 0.0 - 1.3 10e3/uL    Absolute Eosinophils 0.2 0.0 - 0.7 10e3/uL    Absolute Basophils 0.1 0.0 - 0.2 10e3/uL    Absolute Immature Granulocytes 0.0 <=0.4 10e3/uL     Sincerely,      Nam Huitron MD/joyce    Electronically signed

## 2025-03-31 NOTE — PROGRESS NOTES
Preventive Care Visit  [facility] PB Huitron MD, Internal Medicine  Mar 31, 2025      Assessment & Plan     (Z00.00) Encounter for Medicare annual wellness exam  (primary encounter diagnosis)  Comment: Routine issues, see orders section of this encounter.    (F32.A) Depression, unspecified depression type  (Z63.4) Bereavement  (R63.4) Unintended weight loss  Comment: Has been on celexa, but does not feel this is adequately managing her symptoms at this point. Wonder if her nausea and weight loss symptoms are related to her mood considering her age and presentation of geriatric depression. Would hesitate to adjust celexa dosage because of history symptoms with increased dosage. Therefore, would recommend patient connects with therapist to further manage symptoms. Will also evaluate for underlying causes of depressed mood. Patient agreeable to this plan.   Plan: Adult Mental Health  Referral,         citalopram (CELEXA) 10 MG tablet  CBC with platelets and differential,         Comprehensive metabolic panel (BMP + Alb, Alk         Phos, ALT, AST, Total. Bili, TP), TSH with free        T4 reflex, Vitamin B12, ESR: Erythrocyte         sedimentation rate, CRP, inflammation    (L89.311) Pressure injury of right buttock, stage 1  Comment: Suspect these may be related to patient's frail state and decreased mobility. No open areas of skin upon exam. Encouraged keeping area clean and dry.     (J47.9) Bronchiectasis without complication (H)  Comment: Ongoing, stable, follows with pulmonologist. Continues on azithromycin MWF.     (E03.4) Hypothyroidism due to acquired atrophy of thyroid  Comment: Ongoing, stable. Last TSH was 1.31 in 01/2025, labs pending.  Plan: levothyroxine (SYNTHROID/LEVOTHROID) 25 MCG         tablet    (I42.8) NICM (nonischemic cardiomyopathy) (H)  Comment: Slightly hypertensive today in clinic in 150-157/80, but this was after this frail patient ambulated to clinic room  and transferred for exam. Patient's daughter reports that her blood pressures at home is 123/70. No longer taking imdur. Per chart review patient follows with  [facility], [facility], who recommended current regimen and follow up in one year for ECHO. No acute symptoms today.     (E78.5) Hyperlipidemia LDL goal <130  Comment: add on lipid labs?  Plan: rosuvastatin (CRESTOR) 10 MG tablet    (K21.9) Gastroesophageal reflux disease without esophagitis  Comment: Patient found to be on two PPIs, opted to continue with protonix as this would be cheaper for the patient. She also uses pepcid PRN. Ongoing and stable.  Plan: pantoprazole (PROTONIX) 40 MG EC tablet,         famotidine (PEPCID) 10 MG tablet      Pulmonary hypertension   This diagnosis seems to have arisen form the findings on an echocardiogram from approximately 2017,clinically there seems no evidence of pulmonary hypertension with this patient. Consider deleting this diagnosis ?    Chronic kidney disease stage 3 A - this diagnosis is archaic and should also be left;ikely deleted, it's secondary to numbers from [id number]    GFR Estimate   Date Value Ref Range Status   02/22/2024 63 >60 mL/min/1.73m2 Final   02/17/2023 76 >60 mL/min/1.73m2 Final     Comment:     eGFR calculated using 2021 CKD-EPI equation.   11/19/2021 55 (L) >60 mL/min/1.73m2 Final     Comment:     As of July 11, 2021, eGFR is calculated by the CKD-EPI creatinine equation, without race adjustment. eGFR can be influenced by muscle mass, exercise, and diet. The reported eGFR is an estimation only and is only applicable if the renal function is stable.   08/21/2020 55 (L) >60 mL/min/[1.73_m2] Final     Comment:     Non  GFR Calc  Starting 12/18/2018, serum creatinine based estimated GFR (eGFR) will be   calculated using the Chronic Kidney Disease Epidemiology Collaboration   (CKD-EPI) equation.     05/31/2019 59 (L) >60 mL/min/[1.73_m2] Final     Comment:     Non   GFR Calc  Starting 12/18/2018, serum creatinine based estimated GFR (eGFR) will be   calculated using the Chronic Kidney Disease Epidemiology Collaboration   (CKD-EPI) equation.     12/26/2017 75 >60 mL/min/1.7m2 Final     Comment:     Non  GFR Calc   12/19/2016 79 >60 mL/min/1.7m2 Final     Comment:     Non  GFR Calc   12/16/2015 65 >60 mL/min/1.7m2 Final     Comment:     Non  GFR Calc         Gait alert: patient reports using walker consistently when home. Her son lives with her and is supportive. She does exercises provided by PT to promote general muscle strength.     Patient has been advised of split billing requirements and indicates understanding: Yes        Depression Screening Follow Up        3/31/2025     2:15 PM   PHQ   PHQ-9 Total Score 17    Q9: Thoughts of better off dead/self-harm past 2 weeks Several days   F/U: Thoughts of suicide or self-harm No   F/U: Safety concerns No       Patient-reported         3/31/2025     2:15 PM   Last PHQ-9   1.  Little interest or pleasure in doing things 1   2.  Feeling down, depressed, or hopeless 2   3.  Trouble falling or staying asleep, or sleeping too much 3   4.  Feeling tired or having little energy 3   5.  Poor appetite or overeating 2   6.  Feeling bad about yourself 3   7.  Trouble concentrating 1   8.  Moving slowly or restless 1   Q9: Thoughts of better off dead/self-harm past 2 weeks 1   PHQ-9 Total Score 17    In the past two weeks have you had thoughts of suicide or self harm? No   Do you have concerns about your personal safety or the safety of others? No       Patient-reported                No data to display                      Follow Up Actions Taken  Mental Health Referral placed    Discussed the following ways the patient can remain in a safe environment:  be around others  Counseling  Appropriate preventive services were addressed with this patient via screening, questionnaire,  or discussion as appropriate for fall prevention, nutrition, physical activity, Tobacco-use cessation, social engagement, weight loss and cognition.  Checklist reviewing preventive services available has been given to the patient.  Reviewed patient's diet, addressing concerns and/or questions.   The patient was instructed to see the dentist every 6 months.   She is at risk for psychosocial distress and has been provided with information to reduce risk.   Discussed possible causes of fatigue. Updated plan of care.  Patient reported difficulty with activities of daily living were addressed today.Addressed any concerns about safety while driving.  Information on urinary incontinence and treatment options given to patient.   The patient's PHQ-9 score is consistent with moderate depression. She was provided with information regarding depression.       Earl Quintero is a 89 year old, presenting for the following:  Wellness Visit        3/31/2025     3:02 PM   Additional Questions   Roomed by eliseo   Accompanied by daughter deepak           HPI  She reports her health is overall okay, has some concern about skin issues including an area on her left shin, rash on upper back, and her daughter is concerned about some areas on her bottom.        Advance Care Planning  Patient has a Health Care Directive on file  Advance care planning document is on file and is current.      3/31/2025   General Health   How would you rate your overall physical health? (!) FAIR   Feel stress (tense, anxious, or unable to sleep) Very much   (!) STRESS CONCERN  Not sick ever, just losing weight. Not a good appetite, nauseous all the time for the past six months to a year. She tried using zofran without improvement. Nausea mainly occurs after eating. She normally eats oatmeal or a bagel in the morning and then is nauseous for the rest of the day. She mainly has cottage cheese with fruit for protein approximately three times a day. She  denies fevers, night sweats, black tarry stool, vomiting. She affirms having GERD, which is managed with medication.     Wt Readings from Last 5 Encounters:   03/31/25 46.3 kg (102 lb)   12/27/24 47.2 kg (104 lb)   02/22/24 50.3 kg (111 lb)   10/11/23 53.1 kg (117 lb)   08/18/23 53.1 kg (117 lb)             3/31/2025   Nutrition   Diet: Regular (no restrictions)         3/31/2025   Exercise   Days per week of moderate/strenous exercise 0 days   Average minutes spent exercising at this level 0 min   (!) EXERCISE CONCERN  Minimum, walking through the house. Does exercises from PT.         3/31/2025   Social Factors   Frequency of gathering with friends or relatives Once a week   Worry food won't last until get money to buy more No   Food not last or not have enough money for food? No   Do you have housing? (Housing is defined as stable permanent housing and does not include staying ouside in a car, in a tent, in an abandoned building, in an overnight shelter, or couch-surfing.) Yes   Are you worried about losing your housing? No   Lack of transportation? No   Unable to get utilities (heat,electricity)? No           3/31/2025   Fall Risk   Fallen 2 or more times in the past year? No   Trouble with walking or balance? Yes   Gait Speed Test (Document in seconds) 8.31      Using rolling walker, daughter estimates she needs this all the time. Also using wheel chair. Fell once, a couple of months ago, brought to hospital.       3/31/2025   Activities of Daily Living- Home Safety   Needs help with the following daily activites Transportation    Shopping    Preparing meals    Housework    Bathing    Laundry    Medication administration   Safety concerns in the home None of the above       Multiple values from one day are sorted in reverse-chronological order         3/31/2025   Dental   Dentist two times every year? (!) NO   Was going regularly when she was driving.         3/31/2025   Hearing Screening   Hearing concerns?  None of the above         3/31/2025   Driving Risk Screening   Patient/family members have concerns about driving (!) YES    Not driving anymore        3/31/2025   General Alertness/Fatigue Screening   Have you been more tired than usual lately? (!) YES   Fatigued, some days worse than others. Depends on sleep.         3/31/2025   Urinary Incontinence Screening   Bothered by leaking urine in past 6 months Yes   Using pads.         2024   TB Screening   Were you born outside of the ? (!) YES  Incorrect entry.   [facility].    Today's PHQ-9 Score:       3/31/2025     2:15 PM   PHQ-9 SCORE   PHQ-9 Total Score MyChart 17 (Moderately severe depression)   PHQ-9 Total Score 17        Patient-reported             3/31/2025   Substance Use   Alcohol more than 3/day or more than 7/wk Not Applicable   Do you have a current opioid prescription? No   How severe/bad is pain from 1 to 10? 5/10   Do you use any other substances recreationally? No     Social History     Tobacco Use    Smoking status: Former     Current packs/day: 0.00     Types: Cigarettes     Quit date: 1974     Years since quittin.4    Smokeless tobacco: Never   Vaping Use    Vaping status: Never Used   Substance Use Topics    Alcohol use: Not Currently     Comment: On special occassion    Drug use: No           1/10/2025   LAST FHS-7 RESULTS   1st degree relative breast or ovarian cancer Yes   Any relative bilateral breast cancer No   Any male have breast cancer No   Any ONE woman have BOTH breast AND ovarian cancer No   Any woman with breast cancer before 50yrs No   2 or more relatives with breast AND/OR ovarian cancer No   2 or more relatives with breast AND/OR bowel cancer No       Mammogram Screening - After age 74- determine frequency with patient based on health status, life expectancy and patient goals      Reviewed and updated as needed this visit by Provider                    Past Medical History:   Diagnosis Date    Allergies      idiopathic, anaphyllactic rx's    Bronchiectasis     cough on inhalers recurrent infections    Cataract     Cerebral infarction (H)     COPD (chronic obstructive pulmonary disease) (H)     Disorders of lipoid metabolism     hx of on low fat diet    Esotropia chronic c prism 11/6/2013    Heart disease     Hypertension goal BP (blood pressure) < 150/90     Impingement syndrome of right shoulder 11/28/2022    Knee pain     Other bone/cartilage disease     Post-Nasal Drainage     Unspecified asthma(493.90)     Asthma    Unspecified hypothyroidism     Uterine fibroid     10/08 U/s shows 4X2.9X3/3     Past Surgical History:   Procedure Laterality Date    APPENDECTOMY      BREAST BIOPSY, RT/LT      Breat Biopsy RT/LT    CARDIAC SURGERY  12/03/2017    [facility]    CATARACT IOL, RT/LT      COLONOSCOPY  06/00   And 2008    diverticulosis    COLONOSCOPY  01/13/2012    ENDOSCOPY  01/13/2012    EXTRACAPSULAR CATARACT EXTRATION WITH INTRAOCULAR LENS IMPLANT  01/2000    both eyes    FLEXIBLE SIGMOIDOSCOPY  06/95,08/98    INJECT EPIDURAL LUMBAR N/A 3/30/2023    Procedure: Thoracolumbar Epidural Steroid Injection;  Surgeon: Malu Rojas MD;  Location: [facility] OR    INJECT EPIDURAL LUMBAR / SACRAL SINGLE N/A 8/18/2023    Procedure: Caudal Epidural Steroid Injection;  Surgeon: Malu Rojas MD;  Location: [facility] OR    JOINT REPLACEMTN, KNEE RT/LT  1992    right    YAG CAPSULOTOMY OD (RIGHT EYE) Right 01/31/2023    Dr. Rodriguez    YAG CAPSULOTOMY OS (LEFT EYE) Left 02/07/2023    Dr. Rodriguez    Z TOTAL KNEE ARTHROPLASTY  06/25/2003    left poly exchange     Lab work is in process  Labs reviewed in EPIC  BP Readings from Last 3 Encounters:   03/31/25 (!) 150/80   12/27/24 (!) 188/84   02/22/24 (!) 158/83    Wt Readings from Last 3 Encounters:   03/31/25 46.3 kg (102 lb)   12/27/24 47.2 kg (104 lb)   02/22/24 50.3 kg (111 lb)                  Patient Active Problem List   Diagnosis    SHOULDER IMPINGEMENT - left    Hyperlipidemia LDL  goal <130    Insomnia    Trigger finger, acquired - right ring    Trigger thumb - right    Bronchiectasis (H)    Cerebral infarction (H)    Vitreous syneresis    PVD (posterior vitreous detachment)    Pseudophakia OU    Esotropia chronic c prism    Dry eyes, bilateral    Other idiopathic scoliosis, thoracolumbar region    Ganglion cyst of finger of right hand    Hypertension goal BP (blood pressure) < 150/90    Hypothyroidism due to acquired atrophy of thyroid    Coronary artery disease involving native coronary artery of native heart with angina pectoris    Peripheral arterial disease    COPD (chronic obstructive pulmonary disease) (H)    Age-related osteoporosis without current pathological fracture    Impingement syndrome of right shoulder    Stage 3a chronic kidney disease (H)    Lumbar radiculopathy    Medical marijuana use    NICM (nonischemic cardiomyopathy) (H)    Other secondary pulmonary hypertension (H)     Past Surgical History:   Procedure Laterality Date    APPENDECTOMY      BREAST BIOPSY, RT/LT      Breat Biopsy RT/LT    CARDIAC SURGERY  12/03/2017    [facility]    CATARACT IOL, RT/LT      COLONOSCOPY  06/00   And 2008    diverticulosis    COLONOSCOPY  01/13/2012    ENDOSCOPY  01/13/2012    EXTRACAPSULAR CATARACT EXTRATION WITH INTRAOCULAR LENS IMPLANT  01/2000    both eyes    FLEXIBLE SIGMOIDOSCOPY  06/95,08/98    INJECT EPIDURAL LUMBAR N/A 3/30/2023    Procedure: Thoracolumbar Epidural Steroid Injection;  Surgeon: Malu Rojas MD;  Location: [facility] OR    INJECT EPIDURAL LUMBAR / SACRAL SINGLE N/A 8/18/2023    Procedure: Caudal Epidural Steroid Injection;  Surgeon: Malu Rojas MD;  Location: [facility] OR    JOINT REPLACEMTN, KNEE RT/LT  1992    right    YAG CAPSULOTOMY OD (RIGHT EYE) Right 01/31/2023    Dr. Rodriguze    YAG CAPSULOTOMY OS (LEFT EYE) Left 02/07/2023    Dr. Rodriguez    [facility] TOTAL KNEE ARTHROPLASTY  06/25/2003    left poly exchange       Social History     Tobacco Use    Smoking status: Former      Current packs/day: 0.00     Types: Cigarettes     Quit date: 1974     Years since quittin.4    Smokeless tobacco: Never   Substance Use Topics    Alcohol use: Not Currently     Comment: On special occassion     Family History   Problem Relation Age of Onset    Breast Cancer Mother     Arthritis Mother     Thyroid Disease Mother     Cardiovascular Father     Asthma Maternal Grandmother     Cancer Brother     Genetic Disorder Brother     Other Cancer Brother         Esophageal    Cancer Son         tonsil and tongue, bladder    Alcohol/Drug Son     Other Cancer Son     Heart Disease Daughter     Asthma Daughter     Genetic Disorder Brother     Other Cancer Brother     Glaucoma No family hx of     Macular Degeneration No family hx of          Current Outpatient Medications   Medication Sig Dispense Refill    Acetaminophen (TYLENOL PO) Take 500 mg by mouth 2 times daily      aspirin 81 MG tablet Take by mouth daily      azithromycin (ZITHROMAX) 250 MG tablet 1 tablet every M,W,      citalopram (CELEXA) 10 MG tablet Take 1 tablet (10 mg) by mouth daily. 90 tablet 1    Docusate Sodium (COLACE PO) Take by mouth 2 times daily      famotidine (PEPCID) 10 MG tablet Take 1 tablet (10 mg) by mouth 2 times daily.      ipratropium - albuterol 0.5 mg/2.5 mg/3 mL (DUONEB) 0.5-2.5 (3) MG/3ML neb solution Take 1 vial by nebulization every 6 hours as needed for shortness of breath / dyspnea or wheezing      levothyroxine (SYNTHROID/LEVOTHROID) 25 MCG tablet Take 1 tablet (25 mcg) by mouth every morning (before breakfast). 90 tablet 3    Nitroglycerin (NITROTAB SL) Place 0.4 mg under the tongue      pantoprazole (PROTONIX) 40 MG EC tablet Take 1 tablet (40 mg) by mouth daily. 90 tablet 3    polyethylene glycol (MIRALAX/GLYCOLAX) Packet Take 1 packet by mouth daily      psyllium (METAMUCIL) WAFR Take 2 Wafers by mouth daily      rosuvastatin (CRESTOR) 10 MG tablet Take 1 tablet (10 mg) by mouth daily. 90 tablet 3      Allergies   Allergen Reactions    Other Drug Allergy (See Comments) Unknown    Zocor [Statins]      Recent Labs   Lab Test 02/22/24  1201 02/17/23  1236 11/19/21  0836 11/19/21  0836 08/21/20  0915 05/31/19  1305 12/02/17  0000 06/22/17  0753   LDL 66 56  --  74 69 75   < >  --    HDL 48* 55  --  56 56 61   < >  --    TRIG 111 125  --  112 128 103   < >  --    ALT  --   --   --   --   --   --   --  22   CR 0.87 0.74   < > 0.93 0.93 0.88   < >  --    GFRESTIMATED 63 76   < > 55* 55* 59*   < >  --    GFRESTBLACK  --   --   --   --  64 69   < >  --    POTASSIUM 4.9 4.9   < > 4.9 4.5 4.8   < >  --    TSH 1.31 1.95   < > 3.02 2.75 1.79   < >  --     < > = values in this interval not displayed.           Current providers sharing in care for this patient include:  Patient Care Team:  Nam Huitron MD as PCP - General (Internal Medicine)  Porfirio Rodriguez MD as Assigned Surgical Provider  Malu Rojas MD as MD (Anesthesiology)  Mahad Rojo MA as Audiologist (Audiology)  Nam Huitron MD as Assigned PCP    The following health maintenance items are reviewed in Epic and correct as of today:  Health Maintenance   Topic Date Due    ZOSTER IMMUNIZATION (3 of 3) 10/16/2020    DEXA  10/02/2023    COVID-19 Vaccine (5 - 2024-25 season) 09/01/2024    MEDICARE ANNUAL WELLNESS VISIT  02/22/2025    BMP  02/22/2025    LIPID  02/22/2025    MICROALBUMIN  02/22/2025    TSH W/FREE T4 REFLEX  02/22/2025    HEMOGLOBIN  02/22/2025    ANNUAL REVIEW OF HM ORDERS  12/27/2025    EYE EXAM  03/06/2026    FALL RISK ASSESSMENT  03/31/2026    DTAP/TDAP/TD IMMUNIZATION (4 - Td or Tdap) 04/17/2028    ADVANCE CARE PLANNING  03/31/2030    SPIROMETRY  Completed    COPD ACTION PLAN  Completed    PHQ-2 (once per calendar year)  Completed    INFLUENZA VACCINE  Completed    Pneumococcal Vaccine: 50+ Years  Completed    URINALYSIS  Completed    RSV VACCINE  Completed    HPV IMMUNIZATION  Aged Out    MENINGITIS IMMUNIZATION  Aged Out    MAMMO  SCREENING  Discontinued     Health Maintenance Due   Topic Date Due    ZOSTER IMMUNIZATION (3 of 3) 10/16/2020    DEXA  10/02/2023    COVID-19 Vaccine (5 - 2024-25 season) 09/01/2024    MEDICARE ANNUAL WELLNESS VISIT  02/22/2025    BMP  02/22/2025    LIPID  02/22/2025    MICROALBUMIN  02/22/2025    TSH W/FREE T4 REFLEX  02/22/2025    HEMOGLOBIN  02/22/2025       Depression Screening Follow Up        3/31/2025     2:15 PM   PHQ   PHQ-9 Total Score 17    Q9: Thoughts of better off dead/self-harm past 2 weeks Several days   F/U: Thoughts of suicide or self-harm No   F/U: Safety concerns No       Patient-reported         3/31/2025     2:15 PM   Last PHQ-9   1.  Little interest or pleasure in doing things 1   2.  Feeling down, depressed, or hopeless 2   3.  Trouble falling or staying asleep, or sleeping too much 3   4.  Feeling tired or having little energy 3   5.  Poor appetite or overeating 2   6.  Feeling bad about yourself 3   7.  Trouble concentrating 1   8.  Moving slowly or restless 1   Q9: Thoughts of better off dead/self-harm past 2 weeks 1   PHQ-9 Total Score 17    In the past two weeks have you had thoughts of suicide or self harm? No   Do you have concerns about your personal safety or the safety of others? No       Patient-reported     Can't do stuff she wants to do. Used to belong to clubs, Religious which she used to drive to, but stopped driving at the end of the pandemic. She is going to try to get back to going to Religious activities this week with her daughter.     She feels her mood is worsening, affirms changes in appetite as described above, some guilt about being a burden to her family. She denies anxiety but her daughter voices that patient has said she worries about taxes, her kids, etc. She denies changes in ability to concentrate, suicidal ideation. She is unsure when all of these changes began, and was told to increase her celexa dose. Daughter reported that when they did this, she become more  "confused and forgetful, so they reverted back to the 10 mg dose, but they both feel the medication isn't working as well as it used to to manage her symptoms. She would be interested in talking to a therapist.           No data to display                      Follow Up Actions Taken  Patient to follow up with PCP.  Clinic staff to schedule appointment if able.  Mental Health Referral placed    Discussed the following ways the patient can remain in a safe environment:   encouraged family and social interaction.          Review of Systems  Constitutional, HEENT, cardiovascular, pulmonary, gi and gu systems are negative, except as otherwise noted.     Objective    Exam  BP (!) 158/87   Pulse 71   Temp 97.4  F (36.3  C) (Temporal)   Resp 16   Ht 1.499 m (4' 11\")   Wt 46.3 kg (102 lb)   SpO2 96%   BMI 20.60 kg/m     Estimated body mass index is 20.6 kg/m  as calculated from the following:    Height as of this encounter: 1.499 m (4' 11\").    Weight as of this encounter: 46.3 kg (102 lb).    Physical Exam  GENERAL: alert and no distress, appears stated age.  NECK: no adenopathy, no asymmetry, masses, or scars  RESP: lungs clear to auscultation - no rales, rhonchi or wheezes  CV: regular rate and rhythm, normal S1 S2, no S3 or S4, no murmur, click or rub, no peripheral edema  ABDOMEN: tenderness LUQ and LLQ patient reports this pain is intermittent and does not bother her, tense upon exam but suspect this was due to use of abdominal muscles, and bowel sounds normal  MS: no gross musculoskeletal defects noted, no edema  SKIN: no suspicious lesions or rashes, small areas of blanchable erythema on left and right buttocks near intergluteal cleft.  NEURO: Normal strength and tone, mentation intact and speech normal  PSYCH: mentation appears normal, affect normal        3/31/2025   Mini Cog   Clock Draw Score 2 Normal   3 Item Recall 1 object recalled   Mini Cog Total Score 3            Marlon Ray DNP Student " 3/31/2025 5:08 PM    This patient office visit today was staffed with me, I did review the entire clinical presentation and history, exam and medical decision making with ANAY Ray. I agree with and have approved the office visit entirely. Patient seen with me and ANAY Ray.together today. I agree with assessment and plans.        Signed Electronically by: Nam Huitron MD    Answers submitted by the patient for this visit:  Patient Health Questionnaire (Submitted on 3/31/2025)  If you checked off any problems, how difficult have these problems made it for you to do your work, take care of things at home, or get along with other people?: Somewhat difficult  PHQ9 TOTAL SCORE: 17

## 2025-03-31 NOTE — PATIENT INSTRUCTIONS
Patient Education   Preventive Care Advice   This is general advice given by our system to help you stay healthy. However, your care team may have specific advice just for you. Please talk to your care team about your preventive care needs.  Nutrition  Eat 5 or more servings of fruits and vegetables each day.  Try wheat bread, brown rice and whole grain pasta (instead of white bread, rice, and pasta).  Get enough calcium and vitamin D. Check the label on foods and aim for 100% of the RDA (recommended daily allowance).  Lifestyle  Exercise at least 150 minutes each week  (30 minutes a day, 5 days a week).  Do muscle strengthening activities 2 days a week. These help control your weight and prevent disease.  No smoking.  Wear sunscreen to prevent skin cancer.  Have a dental exam and cleaning every 6 months.  Yearly exams  See your health care team every year to talk about:  Any changes in your health.  Any medicines your care team has prescribed.  Preventive care, family planning, and ways to prevent chronic diseases.  Shots (vaccines)   HPV shots (up to age 26), if you've never had them before.  Hepatitis B shots (up to age 59), if you've never had them before.  COVID-19 shot: Get this shot when it's due.  Flu shot: Get a flu shot every year.  Tetanus shot: Get a tetanus shot every 10 years.  Pneumococcal, hepatitis A, and RSV shots: Ask your care team if you need these based on your risk.  Shingles shot (for age 50 and up)  General health tests  Diabetes screening:  Starting at age 35, Get screened for diabetes at least every 3 years.  If you are younger than age 35, ask your care team if you should be screened for diabetes.  Cholesterol test: At age 39, start having a cholesterol test every 5 years, or more often if advised.  Bone density scan (DEXA): At age 50, ask your care team if you should have this scan for osteoporosis (brittle bones).  Hepatitis C: Get tested at least once in your life.  STIs (sexually  transmitted infections)  Before age 24: Ask your care team if you should be screened for STIs.  After age 24: Get screened for STIs if you're at risk. You are at risk for STIs (including HIV) if:  You are sexually active with more than one person.  You don't use condoms every time.  You or a partner was diagnosed with a sexually transmitted infection.  If you are at risk for HIV, ask about PrEP medicine to prevent HIV.  Get tested for HIV at least once in your life, whether you are at risk for HIV or not.  Cancer screening tests  Cervical cancer screening: If you have a cervix, begin getting regular cervical cancer screening tests starting at age 21.  Breast cancer scan (mammogram): If you've ever had breasts, begin having regular mammograms starting at age 40. This is a scan to check for breast cancer.  Colon cancer screening: It is important to start screening for colon cancer at age 45.  Have a colonoscopy test every 10 years (or more often if you're at risk) Or, ask your provider about stool tests like a FIT test every year or Cologuard test every 3 years.  To learn more about your testing options, visit:   .  For help making a decision, visit:   https://bit.ly/nu86245.  Prostate cancer screening test: If you have a prostate, ask your care team if a prostate cancer screening test (PSA) at age 55 is right for you.  Lung cancer screening: If you are a current or former smoker ages 50 to 80, ask your care team if ongoing lung cancer screenings are right for you.  For informational purposes only. Not to replace the advice of your health care provider. Copyright   2023 [facility]. All rights reserved. Clinically reviewed by the [facility] Transitions Program. [employer] [id number] - REV 01/24.  Learning About Activities of Daily Living  What are activities of daily living?     Activities of daily living (ADLs) are the basic self-care tasks you do every day. These include eating, bathing, dressing,  and moving around.  As you age, and if you have health problems, you may find that it's harder to do some of these tasks. If so, your doctor can suggest ideas that may help.  To measure what kind of help you may need, your doctor will ask how well you are able to do ADLs. Let your doctor know if there are any tasks that you are having trouble doing. This is an important first step to getting help. And when you have the help you need, you can stay as independent as possible.  How will a doctor assess your ADLs?  Asking about ADLs is part of a routine health checkup your doctor will likely do as you age. Your health check might be done in a doctor's office, in your home, or at a hospital. The goal is to find out if you are having any problems that could make it hard to care for yourself or that make it unsafe for you to be on your own.  To measure your ADLs, your doctor will ask how hard it is for you to do routine tasks. Your doctor may also want to know if you have changed the way you do a task because of a health problem. Your doctor may watch how you:  Walk back and forth.  Keep your balance while you stand or walk.  Move from sitting to standing or from a bed to a chair.  Button or unbutton a shirt or sweater.  Remove and put on your shoes.  It's common to feel a little worried or anxious if you find you can't do all the things you used to be able to do. Talking with your doctor about ADLs is a way to make sure you're as safe as possible and able to care for yourself as well as you can. You may want to bring a caregiver, friend, or family member to your checkup. They can help you talk to your doctor.  Follow-up care is a key part of your treatment and safety. Be sure to make and go to all appointments, and call your doctor if you are having problems. It's also a good idea to know your test results and keep a list of the medicines you take.  Current as of: October 24, 2024  Content Version: 14.4    5130-6659  [employer].   Care instructions adapted under license by your healthcare professional. If you have questions about a medical condition or this instruction, always ask your healthcare professional. [employer] disclaims any warranty or liability for your use of this information.    Preventing Falls: Care Instructions  Injuries and health problems such as trouble walking or poor eyesight can increase your risk of falling. So can some medicines. But there are things you can do to help prevent falls. You can exercise to get stronger. You can also arrange your home to make it safer.    Talk to your doctor about the medicines you take. Ask if any of them increase the risk of falls and whether they can be changed or stopped.   Try to exercise regularly. It can help improve your strength and balance. This can help lower your risk of falling.         Practice fall safety and prevention.   Wear low-heeled shoes that fit well and give your feet good support. Talk to your doctor if you have foot problems that make this hard.  Carry a cellphone or wear a medical alert device that you can use to call for help.  Use stepladders instead of chairs to reach high objects. Don't climb if you're at risk for falls. Ask for help, if needed.  Wear the correct eyeglasses, if you need them.        Make your home safer.   Remove rugs, cords, clutter, and furniture from walkways.  Keep your house well lit. Use night-lights in hallways and bathrooms.  Install and use sturdy handrails on stairways.  Wear nonskid footwear, even inside. Don't walk barefoot or in socks without shoes.        Be safe outside.   Use handrails, curb cuts, and ramps whenever possible.  Keep your hands free by using a shoulder bag or backpack.  Try to walk in well-lit areas. Watch out for uneven ground, changes in pavement, and debris.  Be careful in the winter. Walk on the grass or gravel when sidewalks are slippery. Use de-icer on steps and  "walkways. Add non-slip devices to shoes.    Put grab bars and nonskid mats in your shower or tub and near the toilet. Try to use a shower chair or bath bench when bathing.   Get into a tub or shower by putting in your weaker leg first. Get out with your strong side first. Have a phone or medical alert device in the bathroom with you.   Where can you learn more?  Go to https://www.[employer]/patiented  Enter G117 in the search box to learn more about \"Preventing Falls: Care Instructions.\"  Current as of: July 31, 2024  Content Version: 14.4    2071-2452 [employer].   Care instructions adapted under license by your healthcare professional. If you have questions about a medical condition or this instruction, always ask your healthcare professional. [employer] disclaims any warranty or liability for your use of this information.    Learning About Stress  What is stress?     Stress is your body's response to a hard situation. Your body can have a physical, emotional, or mental response. Stress is a fact of life for most people, and it affects everyone differently. What causes stress for you may not be stressful for someone else.  A lot of things can cause stress. You may feel stress when you go on a job interview, take a test, or run a race. This kind of short-term stress is normal and even useful. It can help you if you need to work hard or react quickly. For example, stress can help you finish an important job on time.  Long-term stress is caused by ongoing stressful situations or events. Examples of long-term stress include long-term health problems, ongoing problems at work, or conflicts in your family. Long-term stress can harm your health.  How does stress affect your health?  When you are stressed, your body responds as though you are in danger. It makes hormones that speed up your heart, make you breathe faster, and give you a burst of energy. This is called the fight-or-flight stress " response. If the stress is over quickly, your body goes back to normal and no harm is done.  But if stress happens too often or lasts too long, it can have bad effects. Long-term stress can make you more likely to get sick, and it can make symptoms of some diseases worse. If you tense up when you are stressed, you may develop neck, shoulder, or low back pain. Stress is linked to high blood pressure and heart disease.  Stress also harms your emotional health. It can make you whitten, tense, or depressed. Your relationships may suffer, and you may not do well at work or school.  What can you do to manage stress?  You can try these things to help manage stress:   Do something active. Exercise or activity can help reduce stress. Walking is a great way to get started. Even everyday activities such as housecleaning or yard work can help.  Try yoga or georgia chi. These techniques combine exercise and meditation. You may need some training at first to learn them.  Do something you enjoy. For example, listen to music or go to a movie. Practice your hobby or do volunteer work.  Meditate. This can help you relax, because you are not worrying about what happened before or what may happen in the future.  Do guided imagery. Imagine yourself in any setting that helps you feel calm. You can use online videos, books, or a teacher to guide you.  Do breathing exercises. For example:  From a standing position, bend forward from the waist with your knees slightly bent. Let your arms dangle close to the floor.  Breathe in slowly and deeply as you return to a standing position. Roll up slowly and lift your head last.  Hold your breath for just a few seconds in the standing position.  Breathe out slowly and bend forward from the waist.  Let your feelings out. Talk, laugh, cry, and express anger when you need to. Talking with supportive friends or family, a counselor, or a joseph leader about your feelings is a healthy way to relieve stress.  "Avoid discussing your feelings with people who make you feel worse.  Write. It may help to write about things that are bothering you. This helps you find out how much stress you feel and what is causing it. When you know this, you can find better ways to cope.  What can you do to prevent stress?  You might try some of these things to help prevent stress:  Manage your time. This helps you find time to do the things you want and need to do.  Get enough sleep. Your body recovers from the stresses of the day while you are sleeping.  Get support. Your family, friends, and community can make a difference in how you experience stress.  Limit your news feed. Avoid or limit time on social media or news that may make you feel stressed.  Do something active. Exercise or activity can help reduce stress. Walking is a great way to get started.  Where can you learn more?  Go to https://www.[employer]/patiented  Enter N032 in the search box to learn more about \"Learning About Stress.\"  Current as of: October 24, 2024  Content Version: 14.4    6828-9599 [employer].   Care instructions adapted under license by your healthcare professional. If you have questions about a medical condition or this instruction, always ask your healthcare professional. [employer] disclaims any warranty or liability for your use of this information.    Learning About Sleeping Well  What does sleeping well mean?     Sleeping well means getting enough sleep to feel good and stay healthy. How much sleep is enough varies among people.  The number of hours you sleep and how you feel when you wake up are both important. If you do not feel refreshed, you probably need more sleep. Another sign of not getting enough sleep is feeling tired during the day.  Experts recommend that adults get at least 7 or more hours of sleep per day. Children and older adults need more sleep.  Why is getting enough sleep important?  Getting enough quality " "sleep is a basic part of good health. When your sleep suffers, your physical health, mood, and your thoughts can suffer too. You may find yourself feeling more grumpy or stressed. Not getting enough sleep also can lead to serious problems, including injury, accidents, anxiety, and depression.  What might cause poor sleeping?  Many things can cause sleep problems, including:  Changes to your sleep schedule.  Stress. Stress can be caused by fear about a single event, such as giving a speech. Or you may have ongoing stress, such as worry about work or school.  Depression, anxiety, and other mental or emotional conditions.  Changes in your sleep habits or surroundings. This includes changes that happen where you sleep, such as noise, light, or sleeping in a different bed. It also includes changes in your sleep pattern, such as having jet lag or working a late shift.  Health problems, such as pain, breathing problems, and restless legs syndrome.  Lack of regular exercise.  Using alcohol, nicotine, or caffeine before bed.  How can you help yourself?  Here are some tips that may help you sleep more soundly and wake up feeling more refreshed.  Your sleeping area   Use your bedroom only for sleeping and sex. A bit of light reading may help you fall asleep. But if it doesn't, do your reading elsewhere in the house. Try not to use your TV, computer, smartphone, or tablet while you are in bed.  Be sure your bed is big enough to stretch out comfortably, especially if you have a sleep partner.  Keep your bedroom quiet, dark, and cool. Use curtains, blinds, or a sleep mask to block out light. To block out noise, use earplugs, soothing music, or a \"white noise\" machine.  Your evening and bedtime routine   Create a relaxing bedtime routine. You might want to take a warm shower or bath, or listen to soothing music.  Go to bed at the same time every night. And get up at the same time every morning, even if you feel tired.  What to " "avoid   Limit caffeine (coffee, tea, caffeinated sodas) during the day, and don't have any for at least 6 hours before bedtime.  Avoid drinking alcohol before bedtime. Alcohol can cause you to wake up more often during the night.  Try not to smoke or use tobacco, especially in the evening. Nicotine can keep you awake.  Limit naps during the day, especially close to bedtime.  Avoid lying in bed awake for too long. If you can't fall asleep or if you wake up in the middle of the night and can't get back to sleep within about 20 minutes, get out of bed and go to another room until you feel sleepy.  Avoid taking medicine right before bed that may keep you awake or make you feel hyper or energized. Your doctor can tell you if your medicine may do this and if you can take it earlier in the day.  If you can't sleep   Imagine yourself in a peaceful, pleasant scene. Focus on the details and feelings of being in a place that is relaxing.  Get up and do a quiet or boring activity until you feel sleepy.  Avoid drinking any liquids before going to bed to help prevent waking up often to use the bathroom.  Where can you learn more?  Go to https://www.[employer].net/patiented  Enter J942 in the search box to learn more about \"Learning About Sleeping Well.\"  Current as of: July 31, 2024  Content Version: 14.4 2024-2025 [email or web address].   Care instructions adapted under license by your healthcare professional. If you have questions about a medical condition or this instruction, always ask your healthcare professional. [email or web address] disclaims any warranty or liability for your use of this information.    Bladder Training: Care Instructions  Your Care Instructions     Bladder training is used to treat urge incontinence and stress incontinence. Urge incontinence means that the need to urinate comes on so fast that you can't get to a toilet in time. Stress incontinence means that you leak urine because of pressure on " your bladder. For example, it may happen when you laugh, cough, or lift something heavy.  Bladder training can increase how long you can wait before you have to urinate. It can also help your bladder hold more urine. And it can give you better control over the urge to urinate.  It is important to remember that bladder training takes a few weeks to a few months to make a difference. You may not see results right away, but don't give up.  Follow-up care is a key part of your treatment and safety. Be sure to make and go to all appointments, and call your doctor if you are having problems. It's also a good idea to know your test results and keep a list of the medicines you take.  How can you care for yourself at home?  Work with your doctor to come up with a bladder training program that is right for you. You may use one or more of the following methods.  Delayed urination  In the beginning, try to keep from urinating for 5 minutes after you first feel the need to go.  While you wait, take deep, slow breaths to relax. Kegel exercises can also help you delay the need to go to the bathroom.  After some practice, when you can easily wait 5 minutes to urinate, try to wait 10 minutes before you urinate.  Slowly increase the waiting period until you are able to control when you have to urinate.  Scheduled urination  Empty your bladder when you first wake up in the morning.  Schedule times throughout the day when you will urinate.  Start by going to the bathroom every hour, even if you don't need to go.  Slowly increase the time between trips to the bathroom.  When you have found a schedule that works well for you, keep doing it.  If you wake up during the night and have to urinate, do it. Apply your schedule to waking hours only.  Kegel exercises  These tighten and strengthen pelvic muscles, which can help you control the flow of urine. (If doing these exercises causes pain, stop doing them and talk with your doctor.) To do  "Kegel exercises:  Squeeze your muscles as if you were trying not to pass gas. Or squeeze your muscles as if you were stopping the flow of urine. Your belly, legs, and buttocks shouldn't move.  Hold the squeeze for 3 seconds, then relax for 5 to 10 seconds.  Start with 3 seconds, then add 1 second each week until you are able to squeeze for 10 seconds.  Repeat the exercise 10 times a session. Do 3 to 8 sessions a day.  When should you call for help?  Watch closely for changes in your health, and be sure to contact your doctor if:    Your incontinence is getting worse.     You do not get better as expected.   Where can you learn more?  Go to https://www.[employer].net/patiented  Enter V684 in the search box to learn more about \"Bladder Training: Care Instructions.\"  Current as of: April 30, 2024  Content Version: 14.4    5350-2848 "[employer]".   Care instructions adapted under license by your healthcare professional. If you have questions about a medical condition or this instruction, always ask your healthcare professional. "[employer]" disclaims any warranty or liability for your use of this information.    Learning About Depression Screening  What is depression screening?  Depression screening is a way to see if you have depression symptoms. It may be done by a doctor or counselor. It's often part of a routine checkup. That's because your mental health is just as important as your physical health.  Depression is a mental health condition that affects how you feel, think, and act. You may:  Have less energy.  Lose interest in your daily activities.  Feel sad and grouchy for a long time.  Depression is very common. It affects people of all ages.  Many things can lead to depression. Some people become depressed after they have a stroke or find out they have a major illness like cancer or heart disease. The death of a loved one or a breakup may lead to depression. It can run in families. Most " "experts believe that a combination of inherited genes and stressful life events can cause it.  What happens during screening?  You may be asked to fill out a form about your depression symptoms. You and the doctor will discuss your answers. The doctor may ask you more questions to learn more about how you think, act, and feel.  What happens after screening?  If you have symptoms of depression, your doctor will talk to you about your options.  Doctors usually treat depression with medicines or counseling. Often, combining the two works best. Many people don't get help because they think that they'll get over the depression on their own. But people with depression may not get better unless they get treatment.  The cause of depression is not well understood. There may be many factors involved. But if you have depression, it's not your fault.  A serious symptom of depression is thinking about death or suicide. If you or someone you care about talks about this or about feeling hopeless, get help right away.  It's important to know that depression can be treated. Medicine, counseling, and self-care may help.  Where can you learn more?  Go to https://www.[employer].net/patiented  Enter T185 in the search box to learn more about \"Learning About Depression Screening.\"  Current as of: July 31, 2024  Content Version: 14.4 2024-2025 [employer].   Care instructions adapted under license by your healthcare professional. If you have questions about a medical condition or this instruction, always ask your healthcare professional. [employer] disclaims any warranty or liability for your use of this information.       "

## 2025-04-01 LAB
ALBUMIN SERPL BCG-MCNC: 4.1 G/DL (ref 3.5–5.2)
ALP SERPL-CCNC: 66 U/L (ref 40–150)
ALT SERPL W P-5'-P-CCNC: 12 U/L (ref 0–50)
ANION GAP SERPL CALCULATED.3IONS-SCNC: 11 MMOL/L (ref 7–15)
AST SERPL W P-5'-P-CCNC: 26 U/L (ref 0–45)
BILIRUB SERPL-MCNC: 0.4 MG/DL
BUN SERPL-MCNC: 29.7 MG/DL (ref 8–23)
CALCIUM SERPL-MCNC: 9.6 MG/DL (ref 8.8–10.4)
CHLORIDE SERPL-SCNC: 100 MMOL/L (ref 98–107)
CREAT SERPL-MCNC: 0.73 MG/DL (ref 0.51–0.95)
CRP SERPL-MCNC: <3 MG/L
EGFRCR SERPLBLD CKD-EPI 2021: 77 ML/MIN/1.73M2
GLUCOSE SERPL-MCNC: 92 MG/DL (ref 70–99)
HCO3 SERPL-SCNC: 25 MMOL/L (ref 22–29)
POTASSIUM SERPL-SCNC: 5.3 MMOL/L (ref 3.4–5.3)
PROT SERPL-MCNC: 7.4 G/DL (ref 6.4–8.3)
SODIUM SERPL-SCNC: 136 MMOL/L (ref 135–145)
TSH SERPL DL<=0.005 MIU/L-ACNC: 1.73 UIU/ML (ref 0.3–4.2)
VIT B12 SERPL-MCNC: 834 PG/ML (ref 232–1245)

## 2025-06-02 ENCOUNTER — OFFICE VISIT (OUTPATIENT)
Dept: INTERNAL MEDICINE | Facility: CLINIC | Age: OVER 89
End: 2025-06-02
Payer: COMMERCIAL

## 2025-06-02 VITALS
HEART RATE: 68 BPM | DIASTOLIC BLOOD PRESSURE: 72 MMHG | WEIGHT: 104 LBS | OXYGEN SATURATION: 95 % | TEMPERATURE: 97.6 F | HEIGHT: 59 IN | RESPIRATION RATE: 18 BRPM | BODY MASS INDEX: 20.96 KG/M2 | SYSTOLIC BLOOD PRESSURE: 117 MMHG

## 2025-06-02 DIAGNOSIS — M81.0 AGE-RELATED OSTEOPOROSIS WITHOUT CURRENT PATHOLOGICAL FRACTURE: ICD-10-CM

## 2025-06-02 DIAGNOSIS — I27.29 OTHER SECONDARY PULMONARY HYPERTENSION (H): ICD-10-CM

## 2025-06-02 DIAGNOSIS — J47.9 BRONCHIECTASIS WITHOUT COMPLICATION (H): Primary | ICD-10-CM

## 2025-06-02 DIAGNOSIS — J43.9 PULMONARY EMPHYSEMA, UNSPECIFIED EMPHYSEMA TYPE (H): ICD-10-CM

## 2025-06-02 DIAGNOSIS — Z23 NEED FOR VACCINATION: ICD-10-CM

## 2025-06-02 DIAGNOSIS — I25.119 CORONARY ARTERY DISEASE INVOLVING NATIVE CORONARY ARTERY OF NATIVE HEART WITH ANGINA PECTORIS: ICD-10-CM

## 2025-06-02 DIAGNOSIS — N18.31 STAGE 3A CHRONIC KIDNEY DISEASE (H): ICD-10-CM

## 2025-06-02 DIAGNOSIS — Z13.6 SCREENING FOR CARDIOVASCULAR CONDITION: ICD-10-CM

## 2025-06-02 PROCEDURE — 3074F SYST BP LT 130 MM HG: CPT | Performed by: INTERNAL MEDICINE

## 2025-06-02 PROCEDURE — 3078F DIAST BP <80 MM HG: CPT | Performed by: INTERNAL MEDICINE

## 2025-06-02 PROCEDURE — 99214 OFFICE O/P EST MOD 30 MIN: CPT | Performed by: INTERNAL MEDICINE

## 2025-06-02 ASSESSMENT — ENCOUNTER SYMPTOMS
COUGH: 1
SHORTNESS OF BREATH: 1

## 2025-06-02 NOTE — PROGRESS NOTES
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 Lechuga, and I am able to review the most recent previous office visit notes with his clinic from 4-. Typical for this disease is flare-ups which are basically pneumonia. She is already taking Monday, Wednesday, and Friday schedule ZITHROMAX (azithromycin) as a prophylactic antibiotic but back in April she had symptoms of acute flare-up and was treated with good benefit with Augmentin 875 mg bid times 10 days for worsened symptoms. Patient is basically in with a similar picture today with worsened shortness of breath, dyspnea on exertion, and coughing. Sputum production is worse. Her resting oxygen on room air is ok at 95%. Her respiratory symptoms do not include features of congestive heart failure with no weight gain , no worsened edema and no paroxsysmal nocturnal dyspnea or orthopnea. I recommended that a] we repeat treatment with Augmentin 875 mg bid times 10 days and then b] I agreed to copy Dr. Lechuga on the office visit notes from today     - amoxicillin-clavulanate (AUGMENTIN) 875-125 MG tablet; Take 1 tablet by mouth 2 times daily.    Pulmonary emphysema, unspecified emphysema type (H)  As detailed above. , problem is stable and ongoing monitoring      Other secondary pulmonary hypertension (H)  As detailed above , problem is stable and ongoing monitoring      Stage 3a chronic kidney disease (H)  Problem is stable and ongoing monitoring      Coronary artery disease involving native coronary artery of native heart with angina pectoris  Problem is stable and ongoing monitoring  . She has some chest tightness symptoms however I reviewed closely some cardiology  notes indicating reassurance that there is no sense we are dealing with unstable angina here.    Need for  vaccination  Postponed     Screening for cardiovascular condition  Problem is stable and ongoing monitoring      Age-related osteoporosis without current pathological fracture  DEXA scan is discussed and postponed at patient request      Earl Quintero is a 89 year old, presenting for the following health issues:  Cough, Shortness of Breath, and Chest Pain (Off and on for a few months /)      6/2/2025     1:37 PM   Additional Questions   Roomed by eliseo   Accompanied by daughter     Cough  Associated symptoms include shortness of breath.   Shortness of Breath  Associated symptoms include coughing.   History of Present Illness       Reason for visit:  Was on my calendat as follow up and coughing green mucus   She is taking medications regularly.        Chest tightness symptoms not new  Shortness of breath ongoing   Few months now of symptomatology   More severe lately  Coughing greenish sputum   On and off since lung infection treated with Augmentin 875 mg bid times 10 days , see office visit notes with Dr. Lechuga, pulmonologist with [facility]   Bronchiectasis  is a longterm and well known condition she deals with   Wt Readings from Last 5 Encounters:   06/02/25 47.2 kg (104 lb)   03/31/25 46.3 kg (102 lb)   12/27/24 47.2 kg (104 lb)   02/22/24 50.3 kg (111 lb)   10/11/23 53.1 kg (117 lb)         Review of Systems  Constitutional, HEENT, cardiovascular, pulmonary, gi and gu systems are negative, except as otherwise noted.      Objective    /72   Pulse 68   Temp 97.6  F (36.4  C) (Temporal)   Resp 18   Wt 47.2 kg (104 lb)   SpO2 95%   BMI 21.01 kg/m    Body mass index is 21.01 kg/m .  Physical Exam   GENERAL: alert and no distress, aspirin and answers all questions appropriately , proper level of alertness  NECK: no adenopathy, no asymmetry, masses, or scars  RESP: basilar rales  CV: regular rate and rhythm, normal S1 S2, no S3 or S4, no murmur, click or rub, no  peripheral edema  ABDOMEN: soft, nontender, no hepatosplenomegaly, no masses and bowel sounds normal  MS: no gross musculoskeletal defects noted, no edema    No orders of the defined types were placed in this encounter.            Signed Electronically by: Nam Huitron MD

## 2025-06-02 NOTE — Clinical Note
Please send a copy of this office visit note to Dr. Lechuga with [facility], [place] location , pulmonary

## 2025-06-17 ENCOUNTER — OFFICE VISIT (OUTPATIENT)
Dept: BEHAVIORAL HEALTH | Facility: CLINIC | Age: OVER 89
End: 2025-06-17
Payer: COMMERCIAL

## 2025-06-17 DIAGNOSIS — F41.1 GENERALIZED ANXIETY DISORDER: Primary | ICD-10-CM

## 2025-06-17 DIAGNOSIS — F43.20 ADJUSTMENT DISORDER, UNSPECIFIED TYPE: ICD-10-CM

## 2025-06-17 PROCEDURE — 90832 PSYTX W PT 30 MINUTES: CPT

## 2025-06-17 NOTE — PROGRESS NOTES
"     [facility]: Integrated Behavioral Health    Integrated Behavioral Health   Mental Health & Addiction Services      Progress Note - Initial [facility] Visit     Patient Name: Ingrid Culver    Date: June 17, 2025  Service Type: Individual   Visit Start Time: 3:00 PM  Visit End Time:  3:20 PM   Attendees: Client and daughter   Service Modality: In-person     [facility] Visit Activities (Refresh list every visit): NEW         DATA:     Interactive Complexity: No   Crisis: No     Assessments completed:     The following assessments were completed by patient for this visit:  Pt declined to complete assessments     Referral:   Patient was referred to [facility] by primary care provider.    Reason for referral: clarify behavioral health diagnosis.      [facility] introduced self and role. Discussed informed consent and limits to confidentiality.     Presenting Concerns/ Current Stressors:   The pt reported \"anxiety and stuff. I've lost my mobility and independence, it's been a year.\" The pt reflected o feelings of guilt regarding having 8 children helping care for her. The pt wad encouraged to practice square breathing when they experience tightness in their chest.    Therapeutic Interventions:  Motivational Interviewing (MI): Validated patient's thoughts, feelings and experience.    Response to treatment interventions:   Patient was receptive to interventions utilized.     Safety Issues and Plan for Safety and Risk Management:     Patient denies a history of suicidal ideation, suicide attempts, self-injurious behavior, homicidal ideation, homicidal behavior, and and other safety concerns   Patient denies current fears or concerns for personal safety.   Patient denies current or recent suicidal ideation or behaviors.   Patient denies current or recent homicidal ideation or behaviors.   Patient denies current or recent self injurious behavior or ideation.   Patient denies other safety concerns. "   Recommended that patient call 911 or go to the local ED should there be a change in any of these risk factors   Patient reports there are no firearms in the house.       ASSESSMENT:   Mental Status:     Appearance:   Appropriate    Eye Contact:   Good    Psychomotor Behavior: Normal    Attitude:   Cooperative    Orientation:   All   Speech Rate / Production: Normal/ Responsive   Volume:   Normal    Mood:    Normal   Affect:    Appropriate    Thought Content:  Clear    Thought Form:  Goal Directed  Logical    Insight:    Good         Diagnostic Criteria:   Generalized Anxiety Disorder  A. Excessive anxiety and worry about a number of events or activities (such as work or school performance).   B. The person finds it difficult to control the worry.  C. Select 3 or more symptoms (required for diagnosis). Only one item is required in children.   - Restlessness or feeling keyed up or on edge.    - Being easily fatigued.    - Difficulty concentrating or mind going blank.    - Muscle tension.   D. The focus of the anxiety and worry is not confined to features of an Axis I disorder.  E. The anxiety, worry, or physical symptoms cause clinically significant distress or impairment in social, occupational, or other important areas of functioning.     - The aformentioned symptoms began 1 year(s) ago and occurs 4 days per week and is experienced as mild.  Adjustment Disorder  A. The development of emotional or behavioral symptoms in response to an identifiable stressor(s) occurring within 3 months of the onset of the stressor(s)  B. These symptoms or behaviors are clinically significant, as evidenced by one or both of the following:  C. The stress-related disturbance does not meet criteria for another disorder & is not not an exacerbation of another mental disorder  D. The symptoms do not represent normal bereavement  E. Once the stressor or its consequences have terminated, the symptoms do not persist for more than an  additional 6 months        DSM5 Diagnoses: (Sustained by DSM5 Criteria Listed Above)     Diagnoses: 300.02 (F41.1) Generalized Anxiety Disorder  Adjustment Disorders  309.9 (F43.20) Unspecified     Psychosocial / Contextual Factors: Medical Complexities and Interpersonal Concerns       Collateral Reports Completed:   Not Applicable        PLAN: (Homework, other):     1. Patient was provided:  recommendation to schedule follow-up with [facility] recommendation to follow through on referrals     2. Provider recommended the following referrals: Individual Outpatient Therapy.        3. Suicide Risk and Safety Concerns were assessed for Ingrid Culver    Safety Plan:   Patient denied any current/recent/lifetime history of suicidal ideation and/or behaviors. Recommended that patient call 911 or go to the local ED should there be a change in any of these risk factors       Samantha Yuan [facility], [facility]   June 17, 2025

## 2025-07-29 ENCOUNTER — OFFICE VISIT (OUTPATIENT)
Dept: BEHAVIORAL HEALTH | Facility: CLINIC | Age: OVER 89
End: 2025-07-29
Payer: COMMERCIAL

## 2025-07-29 DIAGNOSIS — F43.20 ADJUSTMENT DISORDER, UNSPECIFIED TYPE: ICD-10-CM

## 2025-07-29 DIAGNOSIS — F41.1 GENERALIZED ANXIETY DISORDER: Primary | ICD-10-CM

## 2025-07-29 PROCEDURE — 90832 PSYTX W PT 30 MINUTES: CPT

## 2025-07-29 ASSESSMENT — ANXIETY QUESTIONNAIRES
IF YOU CHECKED OFF ANY PROBLEMS ON THIS QUESTIONNAIRE, HOW DIFFICULT HAVE THESE PROBLEMS MADE IT FOR YOU TO DO YOUR WORK, TAKE CARE OF THINGS AT HOME, OR GET ALONG WITH OTHER PEOPLE: NOT DIFFICULT AT ALL
3. WORRYING TOO MUCH ABOUT DIFFERENT THINGS: MORE THAN HALF THE DAYS
2. NOT BEING ABLE TO STOP OR CONTROL WORRYING: SEVERAL DAYS
5. BEING SO RESTLESS THAT IT IS HARD TO SIT STILL: NOT AT ALL
GAD7 TOTAL SCORE: 11
1. FEELING NERVOUS, ANXIOUS, OR ON EDGE: MORE THAN HALF THE DAYS
GAD7 TOTAL SCORE: 11
7. FEELING AFRAID AS IF SOMETHING AWFUL MIGHT HAPPEN: NEARLY EVERY DAY
7. FEELING AFRAID AS IF SOMETHING AWFUL MIGHT HAPPEN: NEARLY EVERY DAY
GAD7 TOTAL SCORE: 11
6. BECOMING EASILY ANNOYED OR IRRITABLE: MORE THAN HALF THE DAYS
8. IF YOU CHECKED OFF ANY PROBLEMS, HOW DIFFICULT HAVE THESE MADE IT FOR YOU TO DO YOUR WORK, TAKE CARE OF THINGS AT HOME, OR GET ALONG WITH OTHER PEOPLE?: NOT DIFFICULT AT ALL
4. TROUBLE RELAXING: SEVERAL DAYS

## 2025-07-29 ASSESSMENT — PATIENT HEALTH QUESTIONNAIRE - PHQ9
SUM OF ALL RESPONSES TO PHQ QUESTIONS 1-9: 14
SUM OF ALL RESPONSES TO PHQ QUESTIONS 1-9: 14
10. IF YOU CHECKED OFF ANY PROBLEMS, HOW DIFFICULT HAVE THESE PROBLEMS MADE IT FOR YOU TO DO YOUR WORK, TAKE CARE OF THINGS AT HOME, OR GET ALONG WITH OTHER PEOPLE: VERY DIFFICULT

## 2025-07-29 NOTE — PROGRESS NOTES
"    [facility]: Integrated Behavioral Health    Behavioral Health Clinician Progress Note   Mental Health & Addiction Services      Tuesday July 29, 2025    Patient Name: Ingrid Culver       Service Type:  Individual   Service Location:  Face to Face in Clinic   Visit Start Time: 2:12 PM  Visit End Time:  2:40 PM   Session Length: 16 - 37    Attendees: Client and daughter   Service Modality: In-person   Visit number: 2    [facility] Visit Activities (Refresh list every visit): NEW     Date of Brief Diagnostic Assessment : NA  Treatment Plan Review Date: NA      DATA:    Extended Session (60+ minutes): No   Interactive Complexity: No   Crisis: No   [facility] Patient: No     Assessments completed:  The following assessments were completed by patient for this visit:  PHQ9:       10/20/2022     4:51 PM 1/12/2023     8:14 AM 2/17/2023     9:25 AM 2/22/2024     8:59 AM 3/6/2025    12:39 PM 3/31/2025     2:15 PM 7/29/2025    12:55 PM   PHQ-9 SCORE   PHQ-9 Total Score MyChart 7 (Mild depression) 11 (Moderate depression) 17 (Moderately severe depression) 21 (Severe depression)  17 (Moderately severe depression) 14 (Moderate depression)   PHQ-9 Total Score 7 11 17 21 8 17  14        Patient-reported        Reason for Visit/Presenting Concern:  Anxiety and Grief/Loss    Current Stressors / Issues:   The pt reported \"     Therapeutic Interventions:  Motivational Interviewing (MI): Validated patient's thoughts, feelings and experience.    Response to treatment interventions:   Patient was receptive to interventions utilized.      Progress on Treatment Objective(s) / Homework:   Achieved / completed to satisfaction - PRECONTEMPLATION (Not seeing need for change); Intervened by educating the patient about the effects of current behavior on health.  Evoked information about reasons to continue behavior, express concern / recommendations, and explored any change talk     Medication Review:   No " changes to current psychiatric medication(s)     Medication Compliance:   NA     Chemical Use Review:  Substance Use: Chemical use reviewed, no active concerns identified      Tobacco Use: No current tobacco use.       Assessment: Current Emotional / Mental Status (status of significant symptoms):    Risk status (Self / Other harm or suicidal ideation)   Patient denies a history of suicidal ideation, suicide attempts, self-injurious behavior, homicidal ideation, homicidal behavior, and and other safety concerns   Patient denies current fears or concerns for personal safety.   Patient denies current or recent suicidal ideation or behaviors.   Patient denies current or recent homicidal ideation or behaviors.   Patient denies current or recent self injurious behavior or ideation.   Patient denies other safety concerns.   Recommended that patient call 911 or go to the local ED should there be a change in any of these risk factors      ASSESSMENT:   Mental Status:     Appearance:   Appropriate    Eye Contact:   Good    Psychomotor Behavior: Normal    Attitude:   Cooperative    Orientation:   All   Speech Rate / Production: Normal    Volume:   Normal    Mood:    Normal   Affect:    Appropriate    Thought Content:  Clear    Thought Form:  Coherent  Logical    Insight:    Good          Diagnoses:      Generalized anxiety disorder  Adjustment disorder, unspecified type    Collateral Reports Completed:   Not Applicable       Plan: (Homework, other):   Patient was provided No indications of CD issues  Patient was given information about behavioral services and encouraged to schedule a follow up appointment with the clinic [facility] as needed.         Samantha Yuan [facility], [facility]

## 2025-07-30 ENCOUNTER — PATIENT OUTREACH (OUTPATIENT)
Dept: CARE COORDINATION | Facility: CLINIC | Age: OVER 89
End: 2025-07-30
Payer: COMMERCIAL

## 2025-08-27 ASSESSMENT — PATIENT HEALTH QUESTIONNAIRE - PHQ9
10. IF YOU CHECKED OFF ANY PROBLEMS, HOW DIFFICULT HAVE THESE PROBLEMS MADE IT FOR YOU TO DO YOUR WORK, TAKE CARE OF THINGS AT HOME, OR GET ALONG WITH OTHER PEOPLE: VERY DIFFICULT
SUM OF ALL RESPONSES TO PHQ QUESTIONS 1-9: 20
SUM OF ALL RESPONSES TO PHQ QUESTIONS 1-9: 20

## 2025-08-28 ENCOUNTER — OFFICE VISIT (OUTPATIENT)
Facility: CLINIC | Age: OVER 89
End: 2025-08-28
Payer: COMMERCIAL

## 2025-08-28 DIAGNOSIS — F41.1 GENERALIZED ANXIETY DISORDER: ICD-10-CM

## 2025-08-28 DIAGNOSIS — F43.20 ADJUSTMENT DISORDER, UNSPECIFIED TYPE: ICD-10-CM

## 2025-08-28 DIAGNOSIS — F43.21 ADJUSTMENT DISORDER WITH DEPRESSED MOOD: Primary | ICD-10-CM

## (undated) DEVICE — GLOVE PROTEXIS MICRO 6.0  2D73PM60

## (undated) DEVICE — NDL EPIDURAL TUOHY 20GA 3.5" 405028

## (undated) DEVICE — SYR 10ML PERFIX LL 332152

## (undated) DEVICE — TRAY PAIN INJECTION 97A 640

## (undated) DEVICE — NDL EPIDURAL TUOHY 22GA 3.5" 4911-22

## (undated) DEVICE — PREP CHLORAPREP W/ORANGE TINT 10.5ML 260715